# Patient Record
Sex: MALE | Race: WHITE | NOT HISPANIC OR LATINO | Employment: OTHER | URBAN - METROPOLITAN AREA
[De-identification: names, ages, dates, MRNs, and addresses within clinical notes are randomized per-mention and may not be internally consistent; named-entity substitution may affect disease eponyms.]

---

## 2017-01-05 ENCOUNTER — OFFICE VISIT - HEALTHEAST (OUTPATIENT)
Dept: FAMILY MEDICINE | Facility: CLINIC | Age: 67
End: 2017-01-05

## 2017-01-05 DIAGNOSIS — J40 BRONCHITIS: ICD-10-CM

## 2017-01-05 ASSESSMENT — MIFFLIN-ST. JEOR: SCORE: 1767.36

## 2017-01-06 ENCOUNTER — COMMUNICATION - HEALTHEAST (OUTPATIENT)
Dept: CARDIOLOGY | Facility: CLINIC | Age: 67
End: 2017-01-06

## 2017-01-06 ENCOUNTER — RECORDS - HEALTHEAST (OUTPATIENT)
Dept: ADMINISTRATIVE | Facility: OTHER | Age: 67
End: 2017-01-06

## 2017-01-06 DIAGNOSIS — E78.5 HYPERLIPEMIA: ICD-10-CM

## 2017-04-27 ENCOUNTER — COMMUNICATION - HEALTHEAST (OUTPATIENT)
Dept: CARDIOLOGY | Facility: CLINIC | Age: 67
End: 2017-04-27

## 2017-04-27 DIAGNOSIS — I25.5 ISCHEMIC CARDIOMYOPATHY: ICD-10-CM

## 2017-10-31 ENCOUNTER — RECORDS - HEALTHEAST (OUTPATIENT)
Dept: ADMINISTRATIVE | Facility: OTHER | Age: 67
End: 2017-10-31

## 2017-11-09 ENCOUNTER — OFFICE VISIT - HEALTHEAST (OUTPATIENT)
Dept: FAMILY MEDICINE | Facility: CLINIC | Age: 67
End: 2017-11-09

## 2017-11-09 DIAGNOSIS — N40.0 BPH (BENIGN PROSTATIC HYPERPLASIA): ICD-10-CM

## 2017-11-09 DIAGNOSIS — Z00.00 WELCOME TO MEDICARE PREVENTIVE VISIT: ICD-10-CM

## 2017-11-09 DIAGNOSIS — I25.5 ISCHEMIC CARDIOMYOPATHY: ICD-10-CM

## 2017-11-09 DIAGNOSIS — I25.10 CORONARY ATHEROSCLEROSIS: ICD-10-CM

## 2017-11-09 DIAGNOSIS — C44.90 MALIGNANT NEOPLASM OF SKIN: ICD-10-CM

## 2017-11-09 DIAGNOSIS — R97.20 ELEVATED PROSTATE SPECIFIC ANTIGEN (PSA): ICD-10-CM

## 2017-11-09 LAB
CHOLEST SERPL-MCNC: 96 MG/DL
FASTING STATUS PATIENT QL REPORTED: YES
HDLC SERPL-MCNC: 28 MG/DL
LDLC SERPL CALC-MCNC: 56 MG/DL
TRIGL SERPL-MCNC: 58 MG/DL

## 2017-11-09 ASSESSMENT — MIFFLIN-ST. JEOR: SCORE: 1771.9

## 2017-11-10 LAB
ATRIAL RATE - MUSE: 51 BPM
DIASTOLIC BLOOD PRESSURE - MUSE: NORMAL MMHG
INTERPRETATION ECG - MUSE: NORMAL
P AXIS - MUSE: 35 DEGREES
PR INTERVAL - MUSE: 194 MS
QRS DURATION - MUSE: 120 MS
QT - MUSE: 472 MS
QTC - MUSE: 435 MS
R AXIS - MUSE: -55 DEGREES
SYSTOLIC BLOOD PRESSURE - MUSE: NORMAL MMHG
T AXIS - MUSE: 29 DEGREES
VENTRICULAR RATE- MUSE: 51 BPM

## 2017-11-20 ENCOUNTER — OFFICE VISIT - HEALTHEAST (OUTPATIENT)
Dept: CARDIOLOGY | Facility: CLINIC | Age: 67
End: 2017-11-20

## 2017-11-20 DIAGNOSIS — I25.5 ISCHEMIC CARDIOMYOPATHY: ICD-10-CM

## 2017-11-20 ASSESSMENT — MIFFLIN-ST. JEOR: SCORE: 1805.69

## 2017-11-22 ENCOUNTER — HOSPITAL ENCOUNTER (OUTPATIENT)
Dept: CARDIOLOGY | Facility: HOSPITAL | Age: 67
Discharge: HOME | End: 2017-11-22
Attending: INTERNAL MEDICINE

## 2017-11-22 ASSESSMENT — MIFFLIN-ST. JEOR: SCORE: 1804.78

## 2017-11-24 LAB
AORTIC VALVE MEAN VELOCITY: 78.6 CM/S
ASCENDING AORTA: 4.1 CM
AV DIMENSIONLESS INDEX VTI: 0.8
AV MEAN GRADIENT: 3 MMHG
AV PEAK GRADIENT: 4.7 MMHG
AV VALVE AREA: 3.5 CM2
AV VELOCITY RATIO: 1
BSA FOR ECHO PROCEDURE: 2.24 M2
CV BLOOD PRESSURE: NORMAL MMHG
CV ECHO HEIGHT: 74.5 IN
CV ECHO WEIGHT: 211 LBS
DOP CALC AO PEAK VEL: 108 CM/S
DOP CALC AO VTI: 24.9 CM
DOP CALC LVOT AREA: 4.52 CM2
DOP CALC LVOT DIAMETER: 2.4 CM
DOP CALC LVOT PEAK VEL: 107 CM/S
DOP CALC LVOT STROKE VOLUME: 88.2 CM3
DOP CALCLVOT PEAK VEL VTI: 19.5 CM
ECHO EJECTION FRACTION ESTIMATED: 40 %
EJECTION FRACTION: 44 % (ref 55–75)
FRACTIONAL SHORTENING: 21.8 % (ref 28–44)
INTERVENTRICULAR SEPTUM IN END DIASTOLE: 0.95 CM (ref 0.6–1)
IVS/PW RATIO: 0.8
LA AREA 1: 14.8 CM2
LA AREA 2: 15.9 CM2
LEFT ATRIUM LENGTH: 4.4 CM
LEFT ATRIUM SIZE: 3.8 CM
LEFT ATRIUM VOLUME INDEX: 20.3 ML/M2
LEFT ATRIUM VOLUME: 45.5 CM3
LEFT VENTRICLE DIASTOLIC VOLUME INDEX: 49.1 CM3/M2 (ref 34–74)
LEFT VENTRICLE DIASTOLIC VOLUME: 110 CM3 (ref 62–150)
LEFT VENTRICLE MASS INDEX: 84 G/M2
LEFT VENTRICLE SYSTOLIC VOLUME INDEX: 27.3 CM3/M2 (ref 11–31)
LEFT VENTRICLE SYSTOLIC VOLUME: 61.2 CM3 (ref 21–61)
LEFT VENTRICULAR INTERNAL DIMENSION IN DIASTOLE: 4.9 CM (ref 4.2–5.8)
LEFT VENTRICULAR INTERNAL DIMENSION IN SYSTOLE: 3.83 CM (ref 2.5–4)
LEFT VENTRICULAR MASS: 188.2 G
LEFT VENTRICULAR OUTFLOW TRACT MEAN GRADIENT: 2 MMHG
LEFT VENTRICULAR OUTFLOW TRACT MEAN VELOCITY: 64.9 CM/S
LEFT VENTRICULAR OUTFLOW TRACT PEAK GRADIENT: 3 MMHG
LEFT VENTRICULAR POSTERIOR WALL IN END DIASTOLE: 1.15 CM (ref 0.6–1)
LV STROKE VOLUME INDEX: 39.4 ML/M2
MITRAL VALVE E/A RATIO: 0.7
MV AVERAGE E/E' RATIO: 10.7 CM/S
MV DECELERATION TIME: 271 MS
MV E'TISSUE VEL-LAT: 3.96 CM/S
MV E'TISSUE VEL-MED: 4.74 CM/S
MV LATERAL E/E' RATIO: 11.7
MV MEDIAL E/E' RATIO: 9.8
MV PEAK A VELOCITY: 63.3 CM/S
MV PEAK E VELOCITY: 46.4 CM/S
NUC REST DIASTOLIC VOLUME INDEX: 3376 LBS
NUC REST SYSTOLIC VOLUME INDEX: 74.5 IN
TRICUSPID VALVE ANULAR PLANE SYSTOLIC EXCURSION: 2.4 CM

## 2017-12-02 ENCOUNTER — COMMUNICATION - HEALTHEAST (OUTPATIENT)
Dept: CARDIOLOGY | Facility: CLINIC | Age: 67
End: 2017-12-02

## 2017-12-02 DIAGNOSIS — I25.10 CAD (CORONARY ARTERY DISEASE): ICD-10-CM

## 2017-12-31 ENCOUNTER — COMMUNICATION - HEALTHEAST (OUTPATIENT)
Dept: CARDIOLOGY | Facility: CLINIC | Age: 67
End: 2017-12-31

## 2017-12-31 DIAGNOSIS — E78.5 HYPERLIPEMIA: ICD-10-CM

## 2018-01-19 ENCOUNTER — COMMUNICATION - HEALTHEAST (OUTPATIENT)
Dept: CARDIOLOGY | Facility: CLINIC | Age: 68
End: 2018-01-19

## 2018-01-19 DIAGNOSIS — I25.5 ISCHEMIC CARDIOMYOPATHY: ICD-10-CM

## 2018-02-26 ENCOUNTER — RECORDS - HEALTHEAST (OUTPATIENT)
Dept: ADMINISTRATIVE | Facility: OTHER | Age: 68
End: 2018-02-26

## 2018-07-16 ENCOUNTER — COMMUNICATION - HEALTHEAST (OUTPATIENT)
Dept: CARDIOLOGY | Facility: CLINIC | Age: 68
End: 2018-07-16

## 2018-07-16 DIAGNOSIS — I25.5 ISCHEMIC CARDIOMYOPATHY: ICD-10-CM

## 2018-08-22 ENCOUNTER — COMMUNICATION - HEALTHEAST (OUTPATIENT)
Dept: CARDIOLOGY | Facility: CLINIC | Age: 68
End: 2018-08-22

## 2018-08-22 DIAGNOSIS — I25.10 CAD (CORONARY ARTERY DISEASE): ICD-10-CM

## 2018-09-27 ENCOUNTER — COMMUNICATION - HEALTHEAST (OUTPATIENT)
Dept: CARDIOLOGY | Facility: CLINIC | Age: 68
End: 2018-09-27

## 2018-09-27 DIAGNOSIS — E78.5 HYPERLIPEMIA: ICD-10-CM

## 2018-12-26 ENCOUNTER — COMMUNICATION - HEALTHEAST (OUTPATIENT)
Dept: CARDIOLOGY | Facility: CLINIC | Age: 68
End: 2018-12-26

## 2018-12-26 DIAGNOSIS — E78.5 HYPERLIPEMIA: ICD-10-CM

## 2019-01-14 ENCOUNTER — OFFICE VISIT - HEALTHEAST (OUTPATIENT)
Dept: CARDIOLOGY | Facility: CLINIC | Age: 69
End: 2019-01-14

## 2019-01-14 DIAGNOSIS — I25.10 CORONARY ARTERY DISEASE INVOLVING NATIVE CORONARY ARTERY OF NATIVE HEART WITHOUT ANGINA PECTORIS: ICD-10-CM

## 2019-01-14 ASSESSMENT — MIFFLIN-ST. JEOR: SCORE: 1814.98

## 2019-01-22 ENCOUNTER — RECORDS - HEALTHEAST (OUTPATIENT)
Dept: ADMINISTRATIVE | Facility: OTHER | Age: 69
End: 2019-01-22

## 2019-02-14 ENCOUNTER — COMMUNICATION - HEALTHEAST (OUTPATIENT)
Dept: CARDIOLOGY | Facility: CLINIC | Age: 69
End: 2019-02-14

## 2019-02-14 DIAGNOSIS — I25.5 ISCHEMIC CARDIOMYOPATHY: ICD-10-CM

## 2019-03-22 ENCOUNTER — OFFICE VISIT - HEALTHEAST (OUTPATIENT)
Dept: FAMILY MEDICINE | Facility: CLINIC | Age: 69
End: 2019-03-22

## 2019-03-22 DIAGNOSIS — Z00.00 MEDICARE ANNUAL WELLNESS VISIT, SUBSEQUENT: ICD-10-CM

## 2019-03-22 DIAGNOSIS — I25.10 ATHEROSCLEROSIS OF CORONARY ARTERY OF NATIVE HEART WITHOUT ANGINA PECTORIS, UNSPECIFIED VESSEL OR LESION TYPE: ICD-10-CM

## 2019-03-22 DIAGNOSIS — N40.0 BENIGN PROSTATIC HYPERPLASIA WITHOUT LOWER URINARY TRACT SYMPTOMS: ICD-10-CM

## 2019-03-22 DIAGNOSIS — I25.5 ISCHEMIC CARDIOMYOPATHY: ICD-10-CM

## 2019-03-22 DIAGNOSIS — L98.9 SKIN LESIONS: ICD-10-CM

## 2019-03-22 DIAGNOSIS — E78.5 HYPERLIPIDEMIA, UNSPECIFIED HYPERLIPIDEMIA TYPE: ICD-10-CM

## 2019-03-22 DIAGNOSIS — Z12.11 SCREEN FOR COLON CANCER: ICD-10-CM

## 2019-03-22 LAB
ALBUMIN SERPL-MCNC: 3.8 G/DL (ref 3.5–5)
ALP SERPL-CCNC: 56 U/L (ref 45–120)
ALT SERPL W P-5'-P-CCNC: 30 U/L (ref 0–45)
ANION GAP SERPL CALCULATED.3IONS-SCNC: 10 MMOL/L (ref 5–18)
AST SERPL W P-5'-P-CCNC: 21 U/L (ref 0–40)
BILIRUB DIRECT SERPL-MCNC: 0.4 MG/DL
BILIRUB SERPL-MCNC: 0.9 MG/DL (ref 0–1)
BUN SERPL-MCNC: 20 MG/DL (ref 8–22)
CALCIUM SERPL-MCNC: 9.4 MG/DL (ref 8.5–10.5)
CHLORIDE BLD-SCNC: 106 MMOL/L (ref 98–107)
CHOLEST SERPL-MCNC: 101 MG/DL
CO2 SERPL-SCNC: 26 MMOL/L (ref 22–31)
CREAT SERPL-MCNC: 0.94 MG/DL (ref 0.7–1.3)
ERYTHROCYTE [DISTWIDTH] IN BLOOD BY AUTOMATED COUNT: 12.2 % (ref 11–14.5)
FASTING STATUS PATIENT QL REPORTED: YES
GFR SERPL CREATININE-BSD FRML MDRD: >60 ML/MIN/1.73M2
GLUCOSE BLD-MCNC: 94 MG/DL (ref 70–125)
HCT VFR BLD AUTO: 48.3 % (ref 40–54)
HDLC SERPL-MCNC: 33 MG/DL
HGB BLD-MCNC: 16.2 G/DL (ref 14–18)
LDLC SERPL CALC-MCNC: 52 MG/DL
MCH RBC QN AUTO: 29.4 PG (ref 27–34)
MCHC RBC AUTO-ENTMCNC: 33.5 G/DL (ref 32–36)
MCV RBC AUTO: 88 FL (ref 80–100)
PLATELET # BLD AUTO: 216 THOU/UL (ref 140–440)
PMV BLD AUTO: 8.3 FL (ref 7–10)
POTASSIUM BLD-SCNC: 4.5 MMOL/L (ref 3.5–5)
PROT SERPL-MCNC: 6.7 G/DL (ref 6–8)
PSA SERPL-MCNC: 2.3 NG/ML (ref 0–4.5)
RBC # BLD AUTO: 5.51 MILL/UL (ref 4.4–6.2)
SODIUM SERPL-SCNC: 142 MMOL/L (ref 136–145)
TRIGL SERPL-MCNC: 79 MG/DL
WBC: 6.1 THOU/UL (ref 4–11)

## 2019-03-22 ASSESSMENT — MIFFLIN-ST. JEOR: SCORE: 1782.1

## 2019-04-01 ENCOUNTER — COMMUNICATION - HEALTHEAST (OUTPATIENT)
Dept: CARDIOLOGY | Facility: CLINIC | Age: 69
End: 2019-04-01

## 2019-04-01 DIAGNOSIS — E78.5 HYPERLIPEMIA: ICD-10-CM

## 2019-05-15 ENCOUNTER — COMMUNICATION - HEALTHEAST (OUTPATIENT)
Dept: CARDIOLOGY | Facility: CLINIC | Age: 69
End: 2019-05-15

## 2019-05-15 DIAGNOSIS — I25.10 CAD (CORONARY ARTERY DISEASE): ICD-10-CM

## 2019-10-25 ENCOUNTER — RECORDS - HEALTHEAST (OUTPATIENT)
Dept: ADMINISTRATIVE | Facility: OTHER | Age: 69
End: 2019-10-25

## 2019-10-31 ENCOUNTER — AMBULATORY - HEALTHEAST (OUTPATIENT)
Dept: NURSING | Facility: CLINIC | Age: 69
End: 2019-10-31

## 2019-11-14 ENCOUNTER — COMMUNICATION - HEALTHEAST (OUTPATIENT)
Dept: CARDIOLOGY | Facility: CLINIC | Age: 69
End: 2019-11-14

## 2019-11-14 DIAGNOSIS — I25.5 ISCHEMIC CARDIOMYOPATHY: ICD-10-CM

## 2019-11-14 DIAGNOSIS — I25.10 CAD (CORONARY ARTERY DISEASE): ICD-10-CM

## 2019-12-24 ENCOUNTER — COMMUNICATION - HEALTHEAST (OUTPATIENT)
Dept: CARDIOLOGY | Facility: CLINIC | Age: 69
End: 2019-12-24

## 2019-12-24 DIAGNOSIS — E78.5 HYPERLIPEMIA: ICD-10-CM

## 2020-01-02 ENCOUNTER — OFFICE VISIT - HEALTHEAST (OUTPATIENT)
Dept: FAMILY MEDICINE | Facility: CLINIC | Age: 70
End: 2020-01-02

## 2020-01-02 DIAGNOSIS — J01.40 ACUTE NON-RECURRENT PANSINUSITIS: ICD-10-CM

## 2020-01-02 DIAGNOSIS — I10 BENIGN ESSENTIAL HYPERTENSION: ICD-10-CM

## 2020-01-14 ENCOUNTER — OFFICE VISIT - HEALTHEAST (OUTPATIENT)
Dept: CARDIOLOGY | Facility: CLINIC | Age: 70
End: 2020-01-14

## 2020-01-14 DIAGNOSIS — I25.5 ISCHEMIC CARDIOMYOPATHY: ICD-10-CM

## 2020-01-14 DIAGNOSIS — I25.10 CORONARY ARTERY DISEASE INVOLVING NATIVE CORONARY ARTERY OF NATIVE HEART WITHOUT ANGINA PECTORIS: ICD-10-CM

## 2020-01-14 ASSESSMENT — MIFFLIN-ST. JEOR: SCORE: 1788.9

## 2020-01-16 ENCOUNTER — HOSPITAL ENCOUNTER (OUTPATIENT)
Dept: CARDIOLOGY | Facility: HOSPITAL | Age: 70
Discharge: HOME | End: 2020-01-16
Attending: INTERNAL MEDICINE

## 2020-01-16 ASSESSMENT — MIFFLIN-ST. JEOR: SCORE: 1788.9

## 2020-01-17 LAB
AORTIC ROOT: 4.1 CM
ASCENDING AORTA: 4 CM
BSA FOR ECHO PROCEDURE: 2.23 M2
CV BLOOD PRESSURE: ABNORMAL MMHG
CV ECHO HEIGHT: 73.5 IN
CV ECHO WEIGHT: 211 LBS
DOP CALC LVOT AREA: 3.14 CM2
DOP CALC LVOT DIAMETER: 2 CM
DOP CALC LVOT PEAK VEL: 108 CM/S
DOP CALC LVOT STROKE VOLUME: 75.7 CM3
DOP CALCLVOT PEAK VEL VTI: 24.1 CM
EJECTION FRACTION: 32 % (ref 55–75)
FRACTIONAL SHORTENING: 32.4 % (ref 28–44)
INTERVENTRICULAR SEPTUM IN END DIASTOLE: 0.99 CM (ref 0.6–1)
IVS/PW RATIO: 0.8
LA AREA 1: 19 CM2
LA AREA 2: 19 CM2
LEFT ATRIUM LENGTH: 4.7 CM
LEFT ATRIUM SIZE: 3.4 CM
LEFT ATRIUM VOLUME INDEX: 29.3 ML/M2
LEFT ATRIUM VOLUME: 65.3 ML
LEFT VENTRICLE CARDIAC INDEX: 2 L/MIN/M2
LEFT VENTRICLE CARDIAC OUTPUT: 4.4 L/MIN
LEFT VENTRICLE DIASTOLIC VOLUME INDEX: 49.8 CM3/M2 (ref 34–74)
LEFT VENTRICLE DIASTOLIC VOLUME: 111 CM3 (ref 62–150)
LEFT VENTRICLE HEART RATE: 58 BPM
LEFT VENTRICLE MASS INDEX: 87.3 G/M2
LEFT VENTRICLE SYSTOLIC VOLUME INDEX: 34.1 CM3/M2 (ref 11–31)
LEFT VENTRICLE SYSTOLIC VOLUME: 76 CM3 (ref 21–61)
LEFT VENTRICULAR INTERNAL DIMENSION IN DIASTOLE: 4.75 CM (ref 4.2–5.8)
LEFT VENTRICULAR INTERNAL DIMENSION IN SYSTOLE: 3.21 CM (ref 2.5–4)
LEFT VENTRICULAR MASS: 194.6 G
LEFT VENTRICULAR OUTFLOW TRACT MEAN GRADIENT: 2 MMHG
LEFT VENTRICULAR OUTFLOW TRACT MEAN VELOCITY: 73.7 CM/S
LEFT VENTRICULAR OUTFLOW TRACT PEAK GRADIENT: 5 MMHG
LEFT VENTRICULAR POSTERIOR WALL IN END DIASTOLE: 1.24 CM (ref 0.6–1)
LV STROKE VOLUME INDEX: 33.9 ML/M2
MITRAL VALVE DECELERATION SLOPE: 3050 MM/S2
MITRAL VALVE E/A RATIO: 0.7
MITRAL VALVE PRESSURE HALF-TIME: 59 MS
MV AVERAGE E/E' RATIO: 11.2 CM/S
MV DECELERATION TIME: 201 MS
MV E'TISSUE VEL-LAT: 5.61 CM/S
MV E'TISSUE VEL-MED: 5.32 CM/S
MV LATERAL E/E' RATIO: 10.9
MV MEDIAL E/E' RATIO: 11.5
MV PEAK A VELOCITY: 88.3 CM/S
MV PEAK E VELOCITY: 61.1 CM/S
MV VALVE AREA PRESSURE 1/2 METHOD: 3.7 CM2
NUC REST DIASTOLIC VOLUME INDEX: 3376 LBS
NUC REST SYSTOLIC VOLUME INDEX: 73.5 IN
PR MAX PG: 5 MMHG
PR PEAK VELOCITY: 116 CM/S
TRICUSPID REGURGITATION PEAK PRESSURE GRADIENT: 24.4 MMHG
TRICUSPID VALVE ANULAR PLANE SYSTOLIC EXCURSION: 2.7 CM
TRICUSPID VALVE PEAK REGURGITANT VELOCITY: 247 CM/S

## 2020-01-22 ENCOUNTER — AMBULATORY - HEALTHEAST (OUTPATIENT)
Dept: NURSING | Facility: CLINIC | Age: 70
End: 2020-01-22

## 2020-01-22 DIAGNOSIS — Z23 NEED FOR SHINGLES VACCINE: ICD-10-CM

## 2020-01-28 ENCOUNTER — AMBULATORY - HEALTHEAST (OUTPATIENT)
Dept: CARDIOLOGY | Facility: CLINIC | Age: 70
End: 2020-01-28

## 2020-01-28 DIAGNOSIS — I25.10 CORONARY ATHEROSCLEROSIS: ICD-10-CM

## 2020-01-28 DIAGNOSIS — I25.5 ISCHEMIC CARDIOMYOPATHY: ICD-10-CM

## 2020-02-05 ENCOUNTER — HOSPITAL ENCOUNTER (OUTPATIENT)
Dept: NUCLEAR MEDICINE | Facility: HOSPITAL | Age: 70
Discharge: HOME | End: 2020-02-05
Attending: INTERNAL MEDICINE

## 2020-02-05 ENCOUNTER — HOSPITAL ENCOUNTER (OUTPATIENT)
Dept: CARDIOLOGY | Facility: HOSPITAL | Age: 70
Discharge: HOME | End: 2020-02-05
Attending: INTERNAL MEDICINE

## 2020-02-05 DIAGNOSIS — I25.10 CORONARY ATHEROSCLEROSIS: ICD-10-CM

## 2020-02-05 DIAGNOSIS — I25.5 ISCHEMIC CARDIOMYOPATHY: ICD-10-CM

## 2020-02-05 LAB
CV STRESS CURRENT BP HE: NORMAL
CV STRESS CURRENT HR HE: 53
CV STRESS CURRENT HR HE: 53
CV STRESS CURRENT HR HE: 54
CV STRESS CURRENT HR HE: 59
CV STRESS CURRENT HR HE: 68
CV STRESS CURRENT HR HE: 69
CV STRESS CURRENT HR HE: 70
CV STRESS CURRENT HR HE: 72
CV STRESS CURRENT HR HE: 76
CV STRESS CURRENT HR HE: 78
CV STRESS CURRENT HR HE: 78
CV STRESS CURRENT HR HE: 84
CV STRESS DEVIATION TIME HE: NORMAL
CV STRESS ECHO PERCENT HR HE: NORMAL
CV STRESS EXERCISE STAGE HE: NORMAL
CV STRESS FINAL RESTING BP HE: NORMAL
CV STRESS FINAL RESTING HR HE: 70
CV STRESS MAX HR HE: 87
CV STRESS MAX TREADMILL GRADE HE: 0
CV STRESS MAX TREADMILL SPEED HE: 0
CV STRESS PEAK DIA BP HE: NORMAL
CV STRESS PEAK SYS BP HE: NORMAL
CV STRESS PHASE HE: NORMAL
CV STRESS PROTOCOL HE: NORMAL
CV STRESS RESTING PT POSITION HE: NORMAL
CV STRESS RESTING PT POSITION HE: NORMAL
CV STRESS ST DEVIATION AMOUNT HE: NORMAL
CV STRESS ST DEVIATION ELEVATION HE: NORMAL
CV STRESS ST EVELATION AMOUNT HE: NORMAL
CV STRESS TEST TYPE HE: NORMAL
CV STRESS TOTAL STAGE TIME MIN 1 HE: NORMAL
NUC STRESS EJECTION FRACTION: 40 %
RATE PRESSURE PRODUCT: NORMAL
STRESS ECHO BASELINE DIASTOLIC HE: 86
STRESS ECHO BASELINE HR: 54
STRESS ECHO BASELINE SYSTOLIC BP: 156
STRESS ECHO CALCULATED PERCENT HR: 57 %
STRESS ECHO LAST STRESS DIASTOLIC BP: 58
STRESS ECHO LAST STRESS HR: 78
STRESS ECHO LAST STRESS SYSTOLIC BP: 121
STRESS ECHO TARGET HR: 153

## 2020-02-10 ENCOUNTER — COMMUNICATION - HEALTHEAST (OUTPATIENT)
Dept: CARDIOLOGY | Facility: CLINIC | Age: 70
End: 2020-02-10

## 2020-02-10 DIAGNOSIS — I25.10 CAD (CORONARY ARTERY DISEASE): ICD-10-CM

## 2020-02-10 DIAGNOSIS — I25.5 ISCHEMIC CARDIOMYOPATHY: ICD-10-CM

## 2020-03-23 ENCOUNTER — COMMUNICATION - HEALTHEAST (OUTPATIENT)
Dept: CARDIOLOGY | Facility: CLINIC | Age: 70
End: 2020-03-23

## 2020-03-23 DIAGNOSIS — E78.5 HYPERLIPEMIA: ICD-10-CM

## 2020-09-09 ENCOUNTER — COMMUNICATION - HEALTHEAST (OUTPATIENT)
Dept: CARDIOLOGY | Facility: CLINIC | Age: 70
End: 2020-09-09

## 2020-09-09 DIAGNOSIS — I25.5 ISCHEMIC CARDIOMYOPATHY: ICD-10-CM

## 2020-09-16 ENCOUNTER — COMMUNICATION - HEALTHEAST (OUTPATIENT)
Dept: CARDIOLOGY | Facility: CLINIC | Age: 70
End: 2020-09-16

## 2020-09-16 DIAGNOSIS — E78.5 HYPERLIPEMIA: ICD-10-CM

## 2020-11-04 ENCOUNTER — COMMUNICATION - HEALTHEAST (OUTPATIENT)
Dept: CARDIOLOGY | Facility: CLINIC | Age: 70
End: 2020-11-04

## 2020-11-04 DIAGNOSIS — I10 HTN (HYPERTENSION): ICD-10-CM

## 2020-11-16 ENCOUNTER — COMMUNICATION - HEALTHEAST (OUTPATIENT)
Dept: PHARMACY | Facility: HOSPITAL | Age: 70
End: 2020-11-16

## 2020-11-17 ENCOUNTER — AMBULATORY - HEALTHEAST (OUTPATIENT)
Dept: CARDIOLOGY | Facility: CLINIC | Age: 70
End: 2020-11-17

## 2020-11-17 DIAGNOSIS — I10 BENIGN ESSENTIAL HYPERTENSION: ICD-10-CM

## 2020-12-14 ENCOUNTER — OFFICE VISIT - HEALTHEAST (OUTPATIENT)
Dept: FAMILY MEDICINE | Facility: CLINIC | Age: 70
End: 2020-12-14

## 2020-12-14 DIAGNOSIS — Z00.00 MEDICARE ANNUAL WELLNESS VISIT, SUBSEQUENT: ICD-10-CM

## 2020-12-14 DIAGNOSIS — R35.0 BENIGN PROSTATIC HYPERPLASIA WITH URINARY FREQUENCY: ICD-10-CM

## 2020-12-14 DIAGNOSIS — E78.5 HYPERLIPIDEMIA, UNSPECIFIED HYPERLIPIDEMIA TYPE: ICD-10-CM

## 2020-12-14 DIAGNOSIS — R31.0 GROSS HEMATURIA: ICD-10-CM

## 2020-12-14 DIAGNOSIS — I25.10 ATHEROSCLEROSIS OF NATIVE CORONARY ARTERY OF NATIVE HEART WITHOUT ANGINA PECTORIS: ICD-10-CM

## 2020-12-14 DIAGNOSIS — I10 BENIGN ESSENTIAL HYPERTENSION: ICD-10-CM

## 2020-12-14 DIAGNOSIS — N40.1 BENIGN PROSTATIC HYPERPLASIA WITH URINARY FREQUENCY: ICD-10-CM

## 2020-12-14 DIAGNOSIS — I25.5 ISCHEMIC CARDIOMYOPATHY: ICD-10-CM

## 2020-12-14 LAB
ALBUMIN SERPL-MCNC: 4 G/DL (ref 3.5–5)
ALBUMIN UR-MCNC: NEGATIVE MG/DL
ALP SERPL-CCNC: 60 U/L (ref 45–120)
ALT SERPL W P-5'-P-CCNC: 25 U/L (ref 0–45)
ANION GAP SERPL CALCULATED.3IONS-SCNC: 11 MMOL/L (ref 5–18)
APPEARANCE UR: CLEAR
AST SERPL W P-5'-P-CCNC: 18 U/L (ref 0–40)
BILIRUB DIRECT SERPL-MCNC: 0.4 MG/DL
BILIRUB SERPL-MCNC: 0.9 MG/DL (ref 0–1)
BILIRUB UR QL STRIP: NEGATIVE
BUN SERPL-MCNC: 17 MG/DL (ref 8–22)
CALCIUM SERPL-MCNC: 9 MG/DL (ref 8.5–10.5)
CHLORIDE BLD-SCNC: 104 MMOL/L (ref 98–107)
CHOLEST SERPL-MCNC: 96 MG/DL
CO2 SERPL-SCNC: 26 MMOL/L (ref 22–31)
COLOR UR AUTO: YELLOW
CREAT SERPL-MCNC: 0.87 MG/DL (ref 0.7–1.3)
ERYTHROCYTE [DISTWIDTH] IN BLOOD BY AUTOMATED COUNT: 12 % (ref 11–14.5)
FASTING STATUS PATIENT QL REPORTED: YES
GFR SERPL CREATININE-BSD FRML MDRD: >60 ML/MIN/1.73M2
GLUCOSE BLD-MCNC: 95 MG/DL (ref 70–125)
GLUCOSE UR STRIP-MCNC: NEGATIVE MG/DL
HCT VFR BLD AUTO: 49.1 % (ref 40–54)
HDLC SERPL-MCNC: 33 MG/DL
HGB BLD-MCNC: 16.5 G/DL (ref 14–18)
HGB UR QL STRIP: NEGATIVE
KETONES UR STRIP-MCNC: NEGATIVE MG/DL
LDLC SERPL CALC-MCNC: 49 MG/DL
LEUKOCYTE ESTERASE UR QL STRIP: NEGATIVE
MCH RBC QN AUTO: 29.5 PG (ref 27–34)
MCHC RBC AUTO-ENTMCNC: 33.6 G/DL (ref 32–36)
MCV RBC AUTO: 88 FL (ref 80–100)
NITRATE UR QL: NEGATIVE
PH UR STRIP: 5.5 [PH] (ref 5–8)
PLATELET # BLD AUTO: 191 THOU/UL (ref 140–440)
PMV BLD AUTO: 8.5 FL (ref 7–10)
POTASSIUM BLD-SCNC: 4.6 MMOL/L (ref 3.5–5)
PROT SERPL-MCNC: 6.7 G/DL (ref 6–8)
PSA SERPL-MCNC: 3 NG/ML (ref 0–4.5)
RBC # BLD AUTO: 5.6 MILL/UL (ref 4.4–6.2)
SODIUM SERPL-SCNC: 141 MMOL/L (ref 136–145)
SP GR UR STRIP: 1.02 (ref 1–1.03)
TRIGL SERPL-MCNC: 70 MG/DL
UROBILINOGEN UR STRIP-ACNC: NORMAL
WBC: 5.7 THOU/UL (ref 4–11)

## 2020-12-14 RX ORDER — LISINOPRIL 10 MG/1
10 TABLET ORAL DAILY
Qty: 90 TABLET | Refills: 3 | Status: SHIPPED | OUTPATIENT
Start: 2020-12-14 | End: 2021-12-02

## 2020-12-14 ASSESSMENT — MIFFLIN-ST. JEOR: SCORE: 1789.13

## 2020-12-15 LAB — HCV AB SERPL QL IA: NEGATIVE

## 2021-01-15 ENCOUNTER — RECORDS - HEALTHEAST (OUTPATIENT)
Dept: ADMINISTRATIVE | Facility: OTHER | Age: 71
End: 2021-01-15

## 2021-02-26 ENCOUNTER — COMMUNICATION - HEALTHEAST (OUTPATIENT)
Dept: CARDIOLOGY | Facility: CLINIC | Age: 71
End: 2021-02-26

## 2021-02-26 DIAGNOSIS — E78.5 HYPERLIPEMIA: ICD-10-CM

## 2021-03-04 ENCOUNTER — COMMUNICATION - HEALTHEAST (OUTPATIENT)
Dept: CARDIOLOGY | Facility: CLINIC | Age: 71
End: 2021-03-04

## 2021-03-05 ENCOUNTER — OFFICE VISIT - HEALTHEAST (OUTPATIENT)
Dept: CARDIOLOGY | Facility: CLINIC | Age: 71
End: 2021-03-05

## 2021-03-05 DIAGNOSIS — I25.5 ISCHEMIC CARDIOMYOPATHY: ICD-10-CM

## 2021-03-05 ASSESSMENT — MIFFLIN-ST. JEOR: SCORE: 1814.98

## 2021-03-11 ENCOUNTER — AMBULATORY - HEALTHEAST (OUTPATIENT)
Dept: NURSING | Facility: CLINIC | Age: 71
End: 2021-03-11

## 2021-04-01 ENCOUNTER — AMBULATORY - HEALTHEAST (OUTPATIENT)
Dept: NURSING | Facility: CLINIC | Age: 71
End: 2021-04-01

## 2021-05-09 ENCOUNTER — COMMUNICATION - HEALTHEAST (OUTPATIENT)
Dept: CARDIOLOGY | Facility: CLINIC | Age: 71
End: 2021-05-09

## 2021-05-09 DIAGNOSIS — I25.5 ISCHEMIC CARDIOMYOPATHY: ICD-10-CM

## 2021-05-10 RX ORDER — CARVEDILOL 12.5 MG/1
TABLET ORAL
Qty: 180 TABLET | Refills: 0 | Status: SHIPPED | OUTPATIENT
Start: 2021-05-10 | End: 2021-08-10

## 2021-05-18 ENCOUNTER — OFFICE VISIT - HEALTHEAST (OUTPATIENT)
Dept: FAMILY MEDICINE | Facility: CLINIC | Age: 71
End: 2021-05-18

## 2021-05-18 DIAGNOSIS — M25.561 CHRONIC PAIN OF RIGHT KNEE: ICD-10-CM

## 2021-05-18 DIAGNOSIS — G89.29 CHRONIC PAIN OF RIGHT KNEE: ICD-10-CM

## 2021-05-24 ENCOUNTER — RECORDS - HEALTHEAST (OUTPATIENT)
Dept: ADMINISTRATIVE | Facility: OTHER | Age: 71
End: 2021-05-24

## 2021-05-24 DIAGNOSIS — E78.5 HYPERLIPEMIA: ICD-10-CM

## 2021-05-24 RX ORDER — ATORVASTATIN CALCIUM 40 MG/1
TABLET, FILM COATED ORAL
Qty: 90 TABLET | Refills: 1 | Status: SHIPPED | OUTPATIENT
Start: 2021-05-24 | End: 2021-11-18

## 2021-05-26 NOTE — PROGRESS NOTES
Assessment and Plan:       1. Medicare annual wellness visit, subsequent    Reviewed the annual wellness visit health risk assessment form  Mini cog score is 5/5  PHQ-2 score is 0  Reviewed falls risk    Consider the Shingrix/shingles vaccine      2. Screen for colon cancer    His colonoscopy is due in September 2019    3. Hyperlipidemia, unspecified hyperlipidemia type    Continue atorvastatin  - Hepatic Profile  - Lipid Cascade    4. Atherosclerosis of coronary artery of native heart without angina pectoris, unspecified vessel or lesion type  5. Ischemic cardiomyopathy    Continue to follow-up with cardiology, Dr. Ohara  Check laboratory testing as noted  Continue aspirin, atorvastatin, carvedilol, and lisinopril  Recommend increasing physical activity as tolerated  - Basic Metabolic Panel  - HM2(CBC w/o Differential)    6. Benign prostatic hyperplasia without lower urinary tract symptoms  Status post laser TURP with urology    Check a PSA  Follow-up with urology  - PSA (Prostatic-Specific Antigen), Diagnostic    7. Skin lesions  Actinic keratoses given history    5 skin lesions were treated with liquid nitrogen using the freeze-thaw technique without complication  Recommend follow-up with dermatology given potential progression to skin cancer          The patient's current medical problems were reviewed.    I have had an Advance Directives discussion with the patient.  The following health maintenance schedule was reviewed with the patient and provided in printed form in the after visit summary:   Health Maintenance   Topic Date Due     ZOSTER VACCINES (2 of 3) 09/07/2012     FALL RISK ASSESSMENT  07/29/2017     COLONOSCOPY  09/21/2019     TD 18+ HE  07/14/2021     ADVANCE DIRECTIVES DISCUSSED WITH PATIENT  11/09/2022     PNEUMOCOCCAL POLYSACCHARIDE VACCINE AGE 65 AND OVER  Completed     INFLUENZA VACCINE RULE BASED  Completed     PNEUMOCOCCAL CONJUGATE VACCINE FOR ADULTS (PCV13 OR PREVNAR)  Completed         Subjective:   Chief Complaint: Harrison Barrientos is an 68 y.o. male here for an Annual Wellness visit.   HPI: This is a pleasant 68-year-old male who presents to the clinic for an annual wellness visit.    His medical history is notable for coronary artery disease with nonischemic cardiomyopathy, benign prostatic hypertrophy with obstructive symptoms, and elevated PSA, and hyperlipidemia.  Regarding his cardiovascular history he had a previous myocardial infarction in 2004.  He had single-vessel coronary artery disease and a coronary stent was placed in the proximal LAD.  He did have a follow-up echocardiogram with an ejection fraction of 38%.  He did have a cardiac MRI subsequently which revealed an ejection fraction of 41% to 2015.  He continues to follow-up with cardiology and has been compliant with his medications including carvedilol, atorvastatin, and lisinopril.    Additionally, he has followed up with urology for benign prostatic hypertrophy with obstructive symptoms.  He did have a laser TURP and his symptoms improved considerably.  At one point he did require treatment with Rapaflo but that has not been required recently.    He also has a history of skin cancer and has had a previous squamous cell carcinoma.  He has required treatment with liquid nitrogen on multiple occasions and has had Mohs surgery.  He has multiple skin lesions of concern today.    He rates his health as good.  He exercises 3-5 times per week.  He is not requiring assistance with activities of daily living.  His memory has generally been good.    He would like to get a PSA checked today.  Review of systems otherwise negative for headache, visual changes, chest pain, shortness of breath, palpitations, or other concerns.          Review of Systems:    Please see above.  The rest of the review of systems are negative for all systems.    Patient Care Team:  Brad Roldan MD as PCP - General     Patient Active Problem List    Diagnosis     Squamous Cell Carcinoma Of The Skin     Diverticulosis     Joint Pain, Localized In The Shoulder     Fatigue     Hyperlipidemia     Coronary Artery Disease     Ischemic cardiomyopathy     Neck Pain     BPH (benign prostatic hyperplasia)     Benign Prostatic Hypertrophy With Urinary Obstruction     Serology Prostate-specific Antigen (PSA) Elevated     Past Medical History:   Diagnosis Date     BPH (benign prostatic hyperplasia)      Coronary artery disease      Diverticulosis      Hyperlipidemia      Ischemic cardiomyopathy      Myocardial infarction (H) 2004     Squamous cell carcinoma of skin       Past Surgical History:   Procedure Laterality Date     CORONARY STENT PLACEMENT       CYSTOSCOPY PROSTATE W/ LASER N/A 11/29/2016    Procedure: CYSTOSCOPY WITH TRANSURETHRAL RESECTION OF THE PROSTATE WITH QUANTA LASER;  Surgeon: Tre Cuadra MD;  Location: [facility];  Service:       Family History   Problem Relation Age of Onset     Heart disease Mother         coronary stents     Benign prostatic hyperplasia Father      Heart disease Father      Kidney cancer Father      Benign prostatic hyperplasia Paternal Uncle      Benign prostatic hyperplasia Paternal Grandfather       Social History     Socioeconomic History     Marital status:      Spouse name: Not on file     Number of children: Not on file     Years of education: Not on file     Highest education level: Not on file   Occupational History     Not on file   Social Needs     Financial resource strain: Not on file     Food insecurity:     Worry: Not on file     Inability: Not on file     Transportation needs:     Medical: Not on file     Non-medical: Not on file   Tobacco Use     Smoking status: Never Smoker     Smokeless tobacco: Never Used   Substance and Sexual Activity     Alcohol use: Yes     Comment: occasionally     Drug use: No     Sexual activity: Not on file   Lifestyle     Physical activity:     Days per week:  "Not on file     Minutes per session: Not on file     Stress: Not on file   Relationships     Social connections:     Talks on phone: Not on file     Gets together: Not on file     Attends Alevism service: Not on file     Active member of club or organization: Not on file     Attends meetings of clubs or organizations: Not on file     Relationship status: Not on file     Intimate partner violence:     Fear of current or ex partner: Not on file     Emotionally abused: Not on file     Physically abused: Not on file     Forced sexual activity: Not on file   Other Topics Concern     Not on file   Social History Narrative     Not on file      Current Outpatient Medications   Medication Sig Dispense Refill     aspirin 81 MG EC tablet Take 1 tablet (81 mg total) by mouth daily.  0     atorvastatin (LIPITOR) 40 MG tablet Take 1 tablet (40 mg total) by mouth daily. 90 tablet 0     carvedilol (COREG) 12.5 MG tablet TAKE 1 TABLET BY MOUTH TWICE DAILY WITH MEALS 180 tablet 2     lisinopril (PRINIVIL,ZESTRIL) 5 MG tablet TAKE 1 TABLET BY MOUTH DAILY 90 tablet 2     loratadine (CLARITIN) 10 mg tablet Take 10 mg by mouth daily as needed.        No current facility-administered medications for this visit.       Objective:   Vital Signs:   Visit Vitals  /76   Pulse (!) 56   Ht 6' 1.5\" (1.867 m)   Wt 209 lb 8 oz (95 kg)   BMI 27.27 kg/m         VisionScreening:  No exam data present     PHYSICAL EXAM  General appearance: alert, appears stated age and cooperative  Head: Normocephalic, without obvious abnormality, atraumatic  Eyes: conjunctivae/corneas clear. PERRL, EOM's intact.   Ears: normal TM's and external ear canals both ears  Nose: Nares normal. Septum midline. Mucosa normal. No drainage or sinus tenderness.  Throat: lips, mucosa, and tongue normal; teeth and gums normal  Neck: no adenopathy, supple, symmetrical, trachea midline and thyroid not enlarged, symmetric, no tenderness/mass/nodules  No carotid bruits are " heard  Back: symmetric, no curvature. ROM normal. No CVA tenderness.  Lungs: clear to auscultation bilaterally  Heart: regular rate and rhythm, S1, S2 normal, no murmur, click, rub or gallop  Abdomen: soft, non-tender; bowel sounds normal; no masses,  no organomegaly  Extremities: extremities normal, atraumatic, no cyanosis or edema  Skin: There are multiple whitish scaly lesions involving the forehead and cheeks  Lymph nodes: Cervical nodes normal.  Neurologic: Alert and oriented X 3   Cranial nerves II-XII are intact        Assessment Results 3/22/2019   Activities of Daily Living No help needed   Instrumental Activities of Daily Living No help needed   Mini Cog Total Score 5   Some recent data might be hidden     A Mini-Cog score of 0-2 suggests the possibility of dementia, score of 3-5 suggests no dementia    Identified Health Risks:     The patient was counseled and encouraged to consider modifying their diet and eating habits. He was provided with information on recommended healthy diet options.  Information regarding advance directives (living velázquez), including where he can download the appropriate form, was provided to the patient via the AVS.

## 2021-05-27 VITALS
WEIGHT: 215 LBS | DIASTOLIC BLOOD PRESSURE: 81 MMHG | SYSTOLIC BLOOD PRESSURE: 153 MMHG | TEMPERATURE: 97.1 F | HEART RATE: 57 BPM | RESPIRATION RATE: 16 BRPM

## 2021-05-29 ENCOUNTER — HEALTH MAINTENANCE LETTER (OUTPATIENT)
Age: 71
End: 2021-05-29

## 2021-05-30 VITALS — BODY MASS INDEX: 27.57 KG/M2 | HEIGHT: 73 IN | WEIGHT: 208 LBS

## 2021-05-31 VITALS — WEIGHT: 211.2 LBS | HEIGHT: 75 IN | BODY MASS INDEX: 26.26 KG/M2

## 2021-05-31 VITALS — WEIGHT: 209 LBS | BODY MASS INDEX: 27.7 KG/M2 | HEIGHT: 73 IN

## 2021-05-31 VITALS — HEIGHT: 75 IN | WEIGHT: 211 LBS | BODY MASS INDEX: 26.24 KG/M2

## 2021-06-02 VITALS — WEIGHT: 209.5 LBS | BODY MASS INDEX: 26.89 KG/M2 | HEIGHT: 74 IN

## 2021-06-02 VITALS — BODY MASS INDEX: 27.59 KG/M2 | HEIGHT: 74 IN | WEIGHT: 215 LBS

## 2021-06-03 ENCOUNTER — TRANSFERRED RECORDS (OUTPATIENT)
Dept: HEALTH INFORMATION MANAGEMENT | Facility: CLINIC | Age: 71
End: 2021-06-03
Payer: COMMERCIAL

## 2021-06-03 ENCOUNTER — RECORDS - HEALTHEAST (OUTPATIENT)
Dept: ADMINISTRATIVE | Facility: OTHER | Age: 71
End: 2021-06-03

## 2021-06-04 ENCOUNTER — RECORDS - HEALTHEAST (OUTPATIENT)
Dept: ADMINISTRATIVE | Facility: OTHER | Age: 71
End: 2021-06-04

## 2021-06-04 ENCOUNTER — TRANSFERRED RECORDS (OUTPATIENT)
Dept: HEALTH INFORMATION MANAGEMENT | Facility: CLINIC | Age: 71
End: 2021-06-04
Payer: COMMERCIAL

## 2021-06-04 VITALS
RESPIRATION RATE: 16 BRPM | SYSTOLIC BLOOD PRESSURE: 118 MMHG | WEIGHT: 211 LBS | DIASTOLIC BLOOD PRESSURE: 66 MMHG | BODY MASS INDEX: 27.08 KG/M2 | HEIGHT: 74 IN | HEART RATE: 69 BPM

## 2021-06-04 VITALS
HEART RATE: 60 BPM | DIASTOLIC BLOOD PRESSURE: 75 MMHG | BODY MASS INDEX: 27.58 KG/M2 | OXYGEN SATURATION: 96 % | SYSTOLIC BLOOD PRESSURE: 157 MMHG | TEMPERATURE: 98.2 F | WEIGHT: 211.9 LBS | RESPIRATION RATE: 12 BRPM

## 2021-06-04 VITALS — HEIGHT: 74 IN | BODY MASS INDEX: 27.08 KG/M2 | WEIGHT: 211 LBS

## 2021-06-04 NOTE — PROGRESS NOTES
Walk In Care Note                                                        Date of Visit: 1/2/2020     Chief Complaint   Harrison Barrientos is a(n) 69 y.o. White or  male who presents to [facility] with the following complaint(s):  Sinus Problem (started on henrique, teeth pain for the last few days, coughing at night, no fever (felt warm), runny nose)       Assessment and Plan   1. Acute non-recurrent pansinusitis  - amoxicillin-clavulanate (AUGMENTIN) 875-125 mg per tablet; Take 1 tablet by mouth 2 (two) times a day for 10 days.  Dispense: 20 tablet; Refill: 0    2. Benign essential hypertension      Treating sinusitis with Augmentin as listed above. Recommended use of a probiotic while taking this antibiotic. Patient to follow up with Primary Care provider regarding elevated blood pressure.     Counseled patient regarding assessment and plan for evaluation and treatment. Questions were answered. See AVS for the specific written instructions and educational handout(s) regarding sinusitis that were provided at the conclusion of the visit.     Discussed signs / symptoms that warrant urgent / emergent medical attention.     Follow up with Primary Care in 2 weeks. Return as needed in the interim.      History of Present Illness   Primary symptom: Sinus problem  Onset: 8 day(s) ago  Progression: Worsening  Congestion: Yes  Nasal discharge: Green  Dental pain: Since yesterday  Maxillary sinus pressure: Yes  Frontal sinus pressure: Yes  Headache: No  Fevers: Initially  Chills: Initially  Additional symptoms: Coughing spells  Home therapies utilized: Robitussin  History of sinusitis: Remotely  History of sinus surgery: No     Review of Systems   Review of Systems   All other systems reviewed and are negative.       Physical Exam   Vitals:    01/02/20 0953 01/02/20 0955   BP: 168/81 157/75   Pulse: 60    Resp: 12    Temp: 98.2  F (36.8  C)    TempSrc: Oral    SpO2: 96%    Weight: 211 lb 14.4 oz (96.1 kg)       Physical Exam  Vitals signs and nursing note reviewed.   Constitutional:       General: He is not in acute distress.     Appearance: He is well-developed and normal weight. He is not ill-appearing or toxic-appearing.   HENT:      Head: Normocephalic and atraumatic.      Right Ear: Tympanic membrane, ear canal and external ear normal.      Left Ear: Tympanic membrane, ear canal and external ear normal.      Nose: Mucosal edema present. No rhinorrhea.      Right Sinus: Maxillary sinus tenderness and frontal sinus tenderness present.      Left Sinus: Maxillary sinus tenderness and frontal sinus tenderness present.      Mouth/Throat:      Mouth: Mucous membranes are moist. No oral lesions.      Pharynx: Uvula midline. No oropharyngeal exudate or posterior oropharyngeal erythema.   Eyes:      General: Lids are normal.      Conjunctiva/sclera: Conjunctivae normal.   Neck:      Musculoskeletal: Neck supple. No edema or erythema.   Cardiovascular:      Rate and Rhythm: Normal rate and regular rhythm.      Heart sounds: S1 normal and S2 normal. No murmur. No friction rub. No gallop.    Pulmonary:      Effort: Pulmonary effort is normal.      Breath sounds: Normal breath sounds. No stridor. No wheezing, rhonchi or rales.   Lymphadenopathy:      Cervical: No cervical adenopathy.   Skin:     General: Skin is warm and dry.      Coloration: Skin is not pale.      Findings: No rash.   Neurological:      General: No focal deficit present.      Mental Status: He is alert and oriented to person, place, and time.          Diagnostic Studies   Laboratory:  N/A  Radiology:  N/A  Electrocardiogram:  N/A     Procedure Note   N/A     Pertinent History   The following portions of the patient's history were reviewed and updated as appropriate: allergies, current medications, past family history, past medical history, past social history, past surgical history and problem list.    Patient has Squamous Cell Carcinoma Of The Skin;  Diverticulosis; Joint Pain, Localized In The Shoulder; Fatigue; Hyperlipidemia; Coronary Artery Disease; Ischemic cardiomyopathy; Neck Pain; BPH (benign prostatic hyperplasia); Benign Prostatic Hypertrophy With Urinary Obstruction; Serology Prostate-specific Antigen (PSA) Elevated; and Adenomatous polyp of sigmoid colon on their problem list.    Patient has a past medical history of BPH (benign prostatic hyperplasia), Coronary artery disease, Diverticulosis, Hyperlipidemia, Ischemic cardiomyopathy, Myocardial infarction (H) (2004), and Squamous cell carcinoma of skin.    Patient has a past surgical history that includes Coronary stent placement and Cystoscopy prostate w/ laser (N/A, 11/29/2016).    Patient's family history includes Benign prostatic hyperplasia in his father, paternal grandfather, and paternal uncle; Heart disease in his father and mother; Kidney cancer in his father.    Patient reports that he has never smoked. He has never used smokeless tobacco. He reports current alcohol use. He reports that he does not use drugs.     Portions of this note have been dictated using voice recognition software. Any grammatical or contextual distortions are unintentional and inherent to the software.    Teofilo Emmanuel MD  [facility]

## 2021-06-05 VITALS
BODY MASS INDEX: 28.2 KG/M2 | HEART RATE: 57 BPM | WEIGHT: 212.8 LBS | RESPIRATION RATE: 16 BRPM | TEMPERATURE: 98.8 F | HEIGHT: 73 IN | DIASTOLIC BLOOD PRESSURE: 80 MMHG | SYSTOLIC BLOOD PRESSURE: 150 MMHG

## 2021-06-05 VITALS
BODY MASS INDEX: 27.59 KG/M2 | RESPIRATION RATE: 16 BRPM | WEIGHT: 215 LBS | HEIGHT: 74 IN | SYSTOLIC BLOOD PRESSURE: 122 MMHG | HEART RATE: 72 BPM | DIASTOLIC BLOOD PRESSURE: 66 MMHG

## 2021-06-07 ENCOUNTER — TRANSFERRED RECORDS (OUTPATIENT)
Dept: HEALTH INFORMATION MANAGEMENT | Facility: CLINIC | Age: 71
End: 2021-06-07
Payer: COMMERCIAL

## 2021-06-08 NOTE — PROGRESS NOTES
"Assessment/ Plan     1. Bronchitis  Patient symptoms and exam are consistent with a bacterial bronchitis.  He has been coughing for about 3 weeks, was starting to feel better, and then felt worse again.  At this point, will cover him with oral antibiotics.  Recommend that he follow-up in 2 weeks if no improvement in symptoms, sooner if fever or worsening symptoms.  - azithromycin (ZITHROMAX) 250 MG tablet; Take two tablets po on day one then one tablet po q day on days 2-5.  Dispense: 6 tablet; Refill: 0      Subjective:       Harrison Barrientos is a 66 y.o. male who presents for 3 week history of cough.  This started with a typical URI type symptoms.  He actually was starting to feel better for several days, and then felt worse again.  He has felt more fatigued over the last several days.  The cough now feels \"heavy on his chest\".  It has been somewhat productive.  He denies any fevers.  He did get his flu shot and pneumonia shots.  He denies any lung disorder such as asthma or wheezing.  He did start with this cough several days after having outpatient surgery on his prostate.    The following portions of the patient's history were reviewed and updated as appropriate: allergies, current medications, past family history, past medical history, past social history, past surgical history and problem list.    Review of Systems   A 12 point comprehensive review of systems was negative except as noted.      Current Outpatient Prescriptions   Medication Sig Dispense Refill     atorvastatin (LIPITOR) 40 MG tablet Take 40 mg by mouth bedtime.       azithromycin (ZITHROMAX) 250 MG tablet Take two tablets po on day one then one tablet po q day on days 2-5. 6 tablet 0     carvedilol (COREG) 12.5 MG tablet Take 12.5 mg by mouth 2 (two) times a day with meals.       lisinopril (PRINIVIL,ZESTRIL) 5 MG tablet Take 5 mg by mouth bedtime.       lisinopril (PRINIVIL,ZESTRIL) 5 MG tablet TAKE 1 TABLET BY MOUTH DAILY 90 tablet 2     " "loratadine (CLARITIN) 10 mg tablet Take 10 mg by mouth daily as needed.        tadalafil (CIALIS) 10 MG tablet Take 10 mg by mouth daily as needed for erectile dysfunction.       No current facility-administered medications for this visit.        Objective:        Visit Vitals     /70     Pulse 64     Temp 98.2  F (36.8  C) (Oral)     Resp 12     Ht 6' 1\" (1.854 m)     Wt 208 lb (94.3 kg)     SpO2 96%     BMI 27.44 kg/m2         General appearance: alert, appears stated age and cooperative  Head: Normocephalic, without obvious abnormality, atraumatic  Eyes: conjunctivae/corneas clear.   Ears: normal TM's and external ear canals both ears  Nose: Nares normal. Septum midline. Mucosa normal. No drainage or sinus tenderness.  Throat: lips, mucosa, and tongue normal; teeth and gums normal  Neck: no adenopathy  Lungs: clear to auscultation bilaterally  Heart: regular rate and rhythm, S1, S2 normal, no murmur, click, rub or gallop      No results found for this or any previous visit (from the past 168 hour(s)).       This note has been dictated using voice recognition software. Any grammatical or context distortions are unintentional and inherent to the software  "

## 2021-06-13 NOTE — PROGRESS NOTES
Assessment and Plan:       Patient has been advised of split billing requirements and indicates understanding: Yes, information provided  1. Medicare annual wellness visit, subsequent    Reviewed the annual wellness visit health risk assessment form  Mini cog score is 5/5  PHQ-2 score is 0  Reviewed falls risk    Check hepatitis C testing  - Hepatitis C Antibody (Anti-HCV)    2. Benign essential hypertension  Inadequate control    Reviewed the importance of improved blood pressure control  Continue carvedilol and lisinopril  His recent home blood pressure readings have been well controlled  Follow-up to recheck blood pressure and recommend increasing lisinopril if blood pressure remains elevated  Follow-up with cardiology as planned  - Basic Metabolic Panel  - HM2(CBC w/o Differential)  - lisinopriL (PRINIVIL,ZESTRIL) 10 MG tablet; Take 1 tablet (10 mg total) by mouth daily.  Dispense: 90 tablet; Refill: 3    3. Hyperlipidemia, unspecified hyperlipidemia type    Continue atorvastatin  Check a lipid cascade and hepatic profile  - Hepatic Profile    4. Atherosclerosis of native coronary artery of native heart without angina pectoris  Ischemic cardiomyopathy    Reviewed the echocardiogram from 2019 showing ejection fraction of 32%  Nuclear cardiac stress test was negative for ischemia but revealed evidence of a previous transmural myocardial infarction in the mid to apical segments and anteroseptal segment.    There was akinesis in the mid to apical anterior wall and distal anteroseptal segment.        Continue current medications  He has followed up with cardiology on a consistent basis and has been taking aspirin, atorvastatin, carvedilol, and lisinopril.      - Lipid [place] FASTING    5. Benign prostatic hyperplasia with urinary frequency    Check a PSA  Follow-up with urology  - PSA, Annual Screen (Prostatic-Specific Antigen)    6. Gross hematuria    Check a urinalysis  Refer to urology given that he has a  history of BPH previously treated with laser TURP  Follow-up with urology.  He may need cystoscopy depending on results  - Ambulatory referral to Urology  - Urinalysis-UC if Indicated        The patient's current medical problems were reviewed.    I have had an Advance Directives discussion with the patient.  The following health maintenance schedule was reviewed with the patient and provided in printed form in the after visit summary:   Health Maintenance   Topic Date Due     HEPATITIS C SCREENING  1950     TD 18+ HE  07/14/2021     MEDICARE ANNUAL WELLNESS VISIT  12/14/2021     FALL RISK ASSESSMENT  12/14/2021     LIPID  03/22/2024     ADVANCE CARE PLANNING  03/22/2024     COLORECTAL CANCER SCREENING  10/25/2024     Pneumococcal Vaccine: 65+ Years  Completed     INFLUENZA VACCINE RULE BASED  Completed     ZOSTER VACCINES  Completed     Pneumococcal Vaccine: Pediatrics (0 to 5 Years) and At-Risk Patients (6 to 64 Years)  Aged Out     HEPATITIS B VACCINES  Aged Out        Subjective:   Chief Complaint: Harrison Barrientos is an 70 y.o. male here for an Annual Wellness visit.   HPI: This is a pleasant 70-year-old male who presents to the clinic for an annual wellness visit.    Medical history is notable for coronary artery disease, ischemic cardiomyopathy, benign prostatic hypertrophy with obstructive symptoms, and an elevated PSA as well as hyperlipidemia.  Regarding his cardiovascular history he had a previous myocardial infarction in 2004.  He had a single-vessel coronary artery disease with coronary stent placement in the proximal LAD at the time.  He has followed up with cardiology on a consistent basis for an ischemic cardiomyopathy.  His most recent echocardiogram revealed an ejection fraction of 32% with mild mitral regurgitation and mild tricuspid regurgitation.  His most recent nuclear cardiac stress test was negative for inducible myocardial ischemia in 2019.  There was evidence of a previous transmural  myocardial infarction in the mid to apical segments and anteroseptal segment.  There was akinesis in the mid to apical anterior wall and distal anteroseptal segment.  He has followed up with cardiology on a consistent basis and has been taking aspirin, atorvastatin, carvedilol, and lisinopril.    His blood pressure is elevated today though he states his home blood pressure readings have been in the high 120s over diastolic readings in the 70s.  His previous LDL cholesterol is 52.    He also has a history of skin cancer and has had a previous squamous cell carcinoma.  He has required treatment with liquid nitrogen on multiple occasions and has had Mohs surgery.      Review of systems is notable for recent blood in the urine.  In the past he has followed up with urology and has a known history of BPH treatment and elevated PSA.  He did have a previous laser surgery for BPH.    He has history of colon polyps as well with the most recent colonoscopy in October of 2019.  This revealed a colon polyp in the sigmoid colon which was an adenomatous polyp.    He rates his health as good.  He exercises 3-5 times per week present requiring assistance with activities of daily living.    For allergies he has been treated with loratadine his symptoms have generally been well controlled.    Review of Systems:    Please see above.  The rest of the review of systems are negative for all systems.    Patient Care Team:  Brad Roldan MD as PCP - General  Brad Roldan MD as Assigned PCP  Marline Ohara MD as Assigned Heart and Vascular Provider     Patient Active Problem List   Diagnosis     Squamous Cell Carcinoma Of The Skin     Diverticulosis     Joint Pain, Localized In The Shoulder     Fatigue     Hyperlipidemia     Coronary Artery Disease     Ischemic cardiomyopathy     Neck Pain     BPH (benign prostatic hyperplasia)     Benign Prostatic Hypertrophy With Urinary Obstruction     Serology  Prostate-specific Antigen (PSA) Elevated     Adenomatous polyp of sigmoid colon     Past Medical History:   Diagnosis Date     BPH (benign prostatic hyperplasia)      Coronary artery disease      Diverticulosis      Hyperlipidemia      Ischemic cardiomyopathy      Myocardial infarction (H) 2004     Squamous cell carcinoma of skin       Past Surgical History:   Procedure Laterality Date     CORONARY STENT PLACEMENT       CYSTOSCOPY PROSTATE W/ LASER N/A 11/29/2016    Procedure: CYSTOSCOPY WITH TRANSURETHRAL RESECTION OF THE PROSTATE WITH QUANTA LASER;  Surgeon: Tre Cuadra MD;  Location: [facility];  Service:       Family History   Problem Relation Age of Onset     Heart disease Mother         coronary stents     Benign prostatic hyperplasia Father      Heart disease Father      Kidney cancer Father      Benign prostatic hyperplasia Paternal Uncle      Benign prostatic hyperplasia Paternal Grandfather       Social History     Socioeconomic History     Marital status:      Spouse name: Not on file     Number of children: Not on file     Years of education: Not on file     Highest education level: Not on file   Occupational History     Not on file   Social Needs     Financial resource strain: Not on file     Food insecurity     Worry: Not on file     Inability: Not on file     Transportation needs     Medical: Not on file     Non-medical: Not on file   Tobacco Use     Smoking status: Never Smoker     Smokeless tobacco: Never Used   Substance and Sexual Activity     Alcohol use: Yes     Comment: occasionally     Drug use: No     Sexual activity: Not on file   Lifestyle     Physical activity     Days per week: Not on file     Minutes per session: Not on file     Stress: Not on file   Relationships     Social connections     Talks on phone: Not on file     Gets together: Not on file     Attends [employer] witness service: Not on file     Active member of club or organization: Not on file     Attends  "meetings of clubs or organizations: Not on file     Relationship status: Not on file     Intimate partner violence     Fear of current or ex partner: Not on file     Emotionally abused: Not on file     Physically abused: Not on file     Forced sexual activity: Not on file   Other Topics Concern     Not on file   Social History Narrative     Not on file      Current Outpatient Medications   Medication Sig Dispense Refill     aspirin 81 MG EC tablet Take 1 tablet (81 mg total) by mouth daily.  0     atorvastatin (LIPITOR) 40 MG tablet TAKE 1 TABLET(40 MG) BY MOUTH DAILY 90 tablet 1     carvediloL (COREG) 12.5 MG tablet Take 1 tablet (12.5 mg total) by mouth 2 (two) times a day with meals. 180 tablet 2     loratadine (CLARITIN) 10 mg tablet Take 10 mg by mouth daily as needed.        lisinopriL (PRINIVIL,ZESTRIL) 10 MG tablet Take 1 tablet (10 mg total) by mouth daily. 90 tablet 3     No current facility-administered medications for this visit.       Objective:   Vital Signs:   Visit Vitals  /80   Pulse (!) 57   Temp 98.8  F (37.1  C) (Oral)   Resp 16   Ht 6' 1\" (1.854 m)   Wt 212 lb 12.8 oz (96.5 kg)   BMI 28.08 kg/m           VisionScreening:  No exam data present     PHYSICAL EXAM    General appearance: alert, appears stated age and cooperative  Head: Normocephalic, without obvious abnormality, atraumatic  Eyes: conjunctivae/corneas clear. PERRL, EOM's intact.   Ears: normal TM's and external ear canals both ears  Nose: Nares normal. Septum midline. Mucosa normal.  Throat: lips, mucosa, and tongue normal; teeth and gums normal  Neck: no adenopathy, supple, symmetrical, trachea midline   Back: symmetric, no curvature. ROM normal.  Lungs: clear to auscultation bilaterally  Heart: regular rate and rhythm, S1, S2 normal, no murmur, click, rub or gallop  Abdomen: soft, non-tender  Extremities: extremities normal, atraumatic, no cyanosis or edema  Skin: Skin color, texture, turgor normal  There are multiple scaly " lesions on his scalp  Lymph nodes: Cervical nodes normal.  Neurologic: Alert and oriented X 3.    Cardiology:    NM Pharmacologic Stress Test  Order# [id number]  Reading physician: Lulú Aburto MD Ordering physician: Marline Ohara MD Study date: 20   Patient Information    Patient Name   Harrison Barrientos MRN   [id number] Sex   Male  1   1950 (69 y.o.)   EKG Scan   Stress Test Data - Scan on 20 9:59 AM by   Indications    Coronary atherosclerosis   Ischemic cardiomyopathy   Conclusion          A prior study was conducted on 10/25/2013.  This study has no change when compared with the prior study.     Lexiscan stress ECG is negative for inducible myocardial ischemia.     Lexiscan nuclear stress test is abnormal.  There is previous transmural myocardial infarction in mid to apical segments and anteroseptal segment. There is no reversible change indicating inducible myocardial ischemia.     Normal left ventricular size with akinesis in mid to apical anterior wall and distal anteroseptal segment.     The patient is at an intermediate risk of future cardiac ischemic events.            Assessment Results 2020   Activities of Daily Living No help needed   Instrumental Activities of Daily Living No help needed   Mini Cog Total Score 5   Some recent data might be hidden     A Mini-Cog score of 0-2 suggests the possibility of dementia, score of 3-5 suggests no dementia    Identified Health Risks:     Information regarding advance directives (living velázquez), including where he can download the appropriate form, was provided to the patient via the AVS.

## 2021-06-14 NOTE — PROGRESS NOTES
Assessment and Plan:       1. Welcome to Medicare preventive visit    An ECG was performed.  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  This is due again in 2019    2. Coronary atherosclerosis  3. Ischemic cardiomyopathy    Laboratory testing done  Patient will follow up with Dr. Ohara, cardiology  - Basic Metabolic Panel  - Hepatic Profile  - Lipid Cascade  - HM2(CBC w/o Differential)    4. BPH (benign prostatic hyperplasia)  5. Serology Prostate-specific Antigen (PSA) Elevated    Status post TURP    Follow-up with urology        6. Squamous Cell Carcinoma Of The Skin    Recommend regular follow-up with dermatology      The patient's current medical problems were reviewed.    I have had an Advance Directives discussion with the patient.  The following health maintenance schedule was reviewed with the patient and provided in printed form in the after visit summary:   Health Maintenance   Topic Date Due     ADVANCE DIRECTIVES DISCUSSED WITH PATIENT  08/31/2014     PNEUMOCOCCAL POLYSACCHARIDE VACCINE AGE 65 AND OVER  07/29/2017     FALL RISK ASSESSMENT  07/29/2017     INFLUENZA VACCINE RULE BASED (1) 08/01/2017     COLONOSCOPY  09/21/2019     TD 18+ HE  07/14/2021     TDAP ADULT ONE TIME DOSE  Completed     PNEUMOCOCCAL CONJUGATE VACCINE FOR ADULTS (PCV13 OR PREVNAR)  Completed     ZOSTER VACCINE  Completed        Subjective:   Chief Complaint: Harrison Barrientos is an 67 y.o. male here for a Welcome to Medicare visit.     HPI: This is a pleasant 67-year-old male who presents to clinic for a welcome to Medicare examination.  He has a history of coronary artery disease and known ischemic cardiomyopathy who presents to the clinic for a complete  physical examination.  He has a history of benign prostatic hypertrophy and has had obstructive symptoms and also has had an elevated PSA noted in the past.  He is followed by [facility].  In the past he has been treated with ciprofloxacin for prostatitis.  He does have plans to follow up with urology as he experiences urinary frequency and decreased urinary flow.  He has been taking Rapaflo.  He previously got up at least 3 or 4 times at night and notes that he has to urinate on the hour in the morning.    As a result he was treated with a laser TURP and his symptoms have improved considerably.    Regarding his heart history, he had a previous myocardial infarction in 2004. He had single vessel coronary artery disease and a coronary stent was placed in the proximal LAD. He has had follow-up and an echocardiogram showed an ejection fraction 38%.  His echocardiogram last year revealed an ejection fraction of 35%. He continues to follow-up with cardiology and will see Dr. nance in the near future.  His cardiac MRI revealed an ejection fraction of 41% in 2015.  He describes his health as being good.  He gets exercise 0-2 times per week.  He does have regular alcohol.  He has no concerns with being able to perform his activities of daily living.  His last colonoscopy was in September 2009.  This revealed mild diverticulosis but was otherwise normal.    Review of Systems:    Please see above.  The rest of the review of systems are negative for all systems.    Patient Care Team:  Brad Roldan MD as PCP - General     Patient Active Problem List   Diagnosis     Squamous Cell Carcinoma Of The Skin     Diverticulosis     Joint Pain, Localized In The Shoulder     Fatigue     Hyperlipidemia     Coronary Artery Disease     Ischemic cardiomyopathy     Neck Pain     BPH (benign prostatic hyperplasia)     Benign Prostatic Hypertrophy With Urinary Obstruction     Serology Prostate-specific Antigen (PSA) Elevated      Past Medical History:   Diagnosis Date     BPH (benign prostatic hyperplasia)      Coronary artery disease      Diverticulosis      Hyperlipidemia      Ischemic cardiomyopathy      Myocardial infarction 2004     Squamous cell carcinoma of skin       Past Surgical History:   Procedure Laterality Date     CORONARY STENT PLACEMENT       CYSTOSCOPY PROSTATE W/ LASER N/A 11/29/2016    Procedure: CYSTOSCOPY WITH TRANSURETHRAL RESECTION OF THE PROSTATE WITH QUANTA LASER;  Surgeon: Tre Cuadra MD;  Location: [facility];  Service:       Family History   Problem Relation Age of Onset     Heart disease Mother      coronary stents     Benign prostatic hyperplasia Father      Heart disease Father      Colon cancer Father      Kidney cancer Father      Benign prostatic hyperplasia Paternal Uncle      Benign prostatic hyperplasia Paternal Grandfather       Social History     Social History     Marital status:      Spouse name: N/A     Number of children: N/A     Years of education: N/A     Occupational History     Not on file.     Social History Main Topics     Smoking status: Never Smoker     Smokeless tobacco: Never Used     Alcohol use Yes      Comment: occasionally     Drug use: No     Sexual activity: Not on file     Other Topics Concern     Not on file     Social History Narrative       Current Outpatient Prescriptions   Medication Sig Dispense Refill     atorvastatin (LIPITOR) 40 MG tablet Take 1 tablet (40 mg total) by mouth daily. 90 tablet 3     carvedilol (COREG) 12.5 MG tablet TAKE 1 TABLET BY MOUTH TWICE DAILY WITH MEALS 180 tablet 2     lisinopril (PRINIVIL,ZESTRIL) 5 MG tablet TAKE 1 TABLET BY MOUTH DAILY 90 tablet 2     loratadine (CLARITIN) 10 mg tablet Take 10 mg by mouth daily as needed.        tadalafil (CIALIS) 10 MG tablet Take 10 mg by mouth daily as needed for erectile dysfunction.       No current facility-administered medications for this visit.       Objective:   Vital  "Signs:   Visit Vitals     /72     Pulse (!) 52     Temp 97.7  F (36.5  C) (Oral)     Resp 16     Ht 6' 1\" (1.854 m)     Wt 209 lb (94.8 kg)     BMI 27.57 kg/m2        EKG:  Sinus bradycardia with a left anterior fascicular block    VisionScreening:   Visual Acuity Screening    Right eye Left eye Both eyes   Without correction:      With correction: 20/25 20/20 20/20        PHYSICAL EXAM  General appearance: alert, appears stated age and cooperative  Head: Normocephalic, without obvious abnormality, atraumatic  Eyes: conjunctivae/corneas clear. PERRL, EOM's intact.   Ears: normal TM's and external ear canals both ears  Nose: Nares normal. Septum midline. Mucosa normal. No drainage or sinus tenderness.  Throat: lips, mucosa, and tongue normal; teeth and gums normal  Neck: no adenopathy, supple, symmetrical, trachea midline and thyroid not enlarged, symmetric, no tenderness/mass/nodules  Back: symmetric, no curvature. ROM normal. No CVA tenderness.  Lungs: clear to auscultation bilaterally  Heart: regular rate and rhythm, S1, S2 normal, no murmur, click, rub or gallop  Abdomen: soft, non-tender; bowel sounds normal; no masses,  no organomegaly  Genitourinary: Deferred  Rectal: Deferred  Extremities: extremities normal, atraumatic, no cyanosis or edema  Skin: Skin color, texture, turgor normal. No rashes or lesions  Lymph nodes: Cervical nodes normal.  Neurologic: Alert and oriented X 3. Normal coordination and gait      Assessment Results 11/9/2017   Activities of Daily Living No help needed   Instrumental Activities of Daily Living No help needed   Mini Cog Total Score 5   Some recent data might be hidden     A Mini Cog score of 0-2 suggests the possibility of dementia, score of 3-5 suggests no dementia    Identified Health Risks:     He is at risk for lack of exercise and has been provided with information to increase physical activity for the benefit of his well-being.  Information regarding advance directives " (living velázquez), including where he can download the appropriate form, was provided to the patient via the AVS.

## 2021-06-16 PROBLEM — D12.5 ADENOMATOUS POLYP OF SIGMOID COLON: Status: ACTIVE | Noted: 2019-11-09

## 2021-06-17 NOTE — PATIENT INSTRUCTIONS - HE
Patient Instructions by Brad Roldan MD at 3/22/2019 10:40 AM     Author: Brad Roldan MD Service: -- Author Type: Physician    Filed: 3/22/2019 11:30 AM Encounter Date: 3/22/2019 Status: Addendum    : Brad Roldan MD (Physician)    Related Notes: Original Note by Brad Roldan MD (Physician) filed at 3/22/2019 11:24 AM       Consider the Shingrix/ shingles vaccine  Your colonoscopy is due in September of this year  Great job with staying active  Follow-up with Dr. Ohara, cardiology      Patient Education   Understanding USDA MyPlate  The USDA (US Department of Agriculture) has guidelines to help you make healthy food choices. These are called MyPlate. MyPlate shows the food groups that make up healthy meals using the image of a place setting. Before you eat, think about the healthiest choices for what to put onto your plate or into your cup or bowl. To learn more about building a healthy plate, visit www.choosemyplate.gov.       The Food Groups    Fruits: Any fruit or 100% fruit juice counts as part of the Fruit Group. Fruits may be fresh, canned, frozen, or dried, and may be whole, cut-up, or pureed. Make half your plate fruits and vegetables.    Vegetables: Any vegetable or 100% vegetable juice counts as a member of the Vegetable Group. Vegetables may be fresh, frozen, canned, or dried. They can be served raw or cooked and may be whole, cut-up, or mashed. Make half your plate fruits and vegetables.     Grains: All foods made from grains are part of the Grains Group. These include wheat, rice, oats, cornmeal, and barley such as bread, pasta, oatmeal, cereal, tortillas, and grits. Grains should be no more than a quarter of your plate. At least half of your grains should be whole grains.    Protein: This group includes meat, poultry, seafood, beans and peas, eggs, processed soy products (like tofu), nuts (including nut butters), and seeds. Make protein choices  no more than a quarter of your plate. Meat and poultry choices should be lean or low fat.    Dairy: All fluid milk products and foods made from milk that contain calcium, like yogurt and cheese are part of the Dairy Group. (Foods that have little calcium, such as cream, butter, and cream cheese, are not part of the group.) Most dairy choices should be low-fat or fat-free.    Oils: These are fats that are liquid at room temperature. They include canola, corn, olive, soybean, and sunflower oil. Foods that are mainly oil include mayonnaise, certain salad dressings, and soft margarines. You should have only 5 to 7 teaspoons of oils a day. You probably already get this much from the food you eat.  Use Flare Code to Help Build Your Meals  The [employer] can help you plan and track your meals and activity. You can look up individual foods to see or compare their nutritional value. You can get guidelines for what and how much you should eat. You can compare your food choices. And you can assess personal physical activities and see ways you can improve. Go to www.Medikal.com.gov/supertracker/.    7507-1826 [employer]. [address] [place] [address]. All rights reserved. This information is not intended as a substitute for professional medical care. Always follow your healthcare professional's instructions.           Patient Education   Understanding Advance Care Planning  Advance care planning is the process of deciding ones own future medical care. It helps ensure that if you cant speak for yourself, your wishes can still be carried out. The plan is a series of legal documents that note a persons wishes. The documents vary by state. Advance care planning may be done when a person has a serious illness that is expected to get worse. It may be done before major surgery. And it can help you and your family be prepared in case of a major illness or injury. Advance care planning helps with making  decisions at these times.       A health care proxy is a person who acts as the voice of a patient when the patient cant speak for himself or herself. The name of this role varies by state. It may be called a Durable Medical Power of  or Durable Power of  for Healthcare. It may be called an agent, surrogate, or advocate. Or it may be called a representative or decision maker. It is an official duty that is identified by a legal document. The document also varies by state.    Why Is Advance Care Planning Important?  If a person communicates their healthcare wishes:    They will be given medical care that matches their values and goals.    Their family members will not be forced to make decisions in a crisis with no guidance.  Creating a Plan  Making an advance care plan is often done in 3 steps:    Thinking about ones wishes. To create an advance care plan, you should think about what kind of medical treatment you would want if you lose the ability to communicate. Are there any situations in which you would refuse or stop treatment? Are there therapies you would want or not want? And whom do you want to make decisions for you? There are many places to learn more about how to plan for your care. Ask your doctor or  for resources.    Picking a health care proxy. This means choosing a trusted person to speak for you only when you cant speak for yourself. When you cannot make medical decisions, your proxy makes sure the instructions in your advance care plan are followed. A proxy does not make decisions based on his or her own opinions. They must put aside those opinions and values if needed, and carry out your wishes.    Filling out the legal documents. There are several kinds of legal documents for advance care planning. Each one tells health care providers your wishes. The documents may vary by state. They must be signed and may need to be witnessed or notarized. You can cancel or change  them whenever you wish. Depending on your state, the documents may include a Healthcare Proxy form, Living Will, Durable Medical Power of , Advance Directive, or others.  The Familys Role  The best help a family can give is to support their loved ones wishes. Open and honest communication is vital. Family should express any concerns they have about the patients choices while the patient can still make decisions.    7982-5911 [employer]. [address] [place] [address], [place] [address]. All rights reserved. This information is not intended as a substitute for professional medical care. Always follow your healthcare professional's instructions.         Also, [facility] [place] offers a free, downloadable health care directive that allows you to share your treatment choices and personal preferences if you cannot communicate your wishes. It also allows you to appoint another person (called a health care agent) to make health care decisions if you are unable to do so. You can download an advance directive by going here: [email or web address]     Patient Education   Personalized Prevention Plan  You are due for the preventive services outlined below.  Your care team is available to assist you in scheduling these services.  If you have already completed any of these items, please share that information with your care team to update in your medical record.  Health Maintenance   Topic Date Due   ? ZOSTER VACCINES (2 of 3) 09/07/2012   ? FALL RISK ASSESSMENT  07/29/2017   ? COLONOSCOPY  09/21/2019   ? TD 18+ HE  07/14/2021   ? ADVANCE DIRECTIVES DISCUSSED WITH PATIENT  11/09/2022   ? PNEUMOCOCCAL POLYSACCHARIDE VACCINE AGE 65 AND OVER  Completed   ? INFLUENZA VACCINE RULE BASED  Completed   ? PNEUMOCOCCAL CONJUGATE VACCINE FOR ADULTS (PCV13 OR PREVNAR)  Completed

## 2021-06-17 NOTE — PATIENT INSTRUCTIONS - HE
Patient Instructions by Teofilo Emmanuel MD at 1/2/2020  9:20 AM     Author: Teofilo Emmanuel MD Service: -- Author Type: Physician    Filed: 1/2/2020 11:04 AM Encounter Date: 1/2/2020 Status: Addendum    : Teofilo Emmanuel MD (Physician)    Related Notes: Original Note by Teofilo Emmanuel MD (Physician) filed at 1/2/2020 11:03 AM       -Take the full course of Augmentin as prescribed.  -Take a probiotic while taking this antibiotic.  This can be purchased over the counter at your local pharmacy.  Patient Education     Sinusitis (Antibiotic Treatment)    The sinuses are air-filled spaces within the bones of the face. They connect to the inside of the nose. Sinusitis is an inflammation of the tissue that lines the sinuses. Sinusitis can occur during a cold. It can also happen due to allergies to pollens and other particles in the air. Sinusitis can cause symptoms of sinus congestion and a feeling of fullness. A sinus infection causes fever, headache, and facial pain. There is often green or yellow fluid draining from the nose or into the back of the throat (post-nasal drip). You have been given antibiotics to treat this condition.  Home care    Take the full course of antibiotics as instructed. Do not stop taking them, even when you feel better.    Drink plenty of water, hot tea, and other liquids. This may help thin nasal mucus. It also may help your sinuses drain fluids.    Heat may help soothe painful areas of your face. Use a towel soaked in hot water. Or,  the shower and direct the warm spray onto your face. Using a vaporizer along with a menthol rub at night may also help soothe symptoms.     An expectorant with guaifenesin may help thin nasal mucus and help your sinuses drain fluids.    You can use an over-the-counter decongestant, unless a similar medicine was prescribed to you. Nasal sprays work the fastest. Use one that contains phenylephrine or oxymetazoline. First blow your nose  gently. Then use the spray. Do not use these medicines more often than directed on the label. If you do, your symptoms may get worse. You may also take pills that contain pseudoephedrine. Dont use products that combine multiple medicines. This is because side effects may be increased. Read labels. You can also ask the pharmacist for help. (People with high blood pressure should not use decongestants. They can raise blood pressure.)    Over-the-counter antihistamines may help if allergies contributed to your sinusitis.      Do not use nasal rinses or irrigation during an acute sinus infection, unless your healthcare provider tells you to. Rinsing may spread the infection to other areas in your sinuses.    Use acetaminophen or ibuprofen to control pain, unless another pain medicine was prescribed to you. If you have chronic liver or kidney disease or ever had a stomach ulcer, talk with your healthcare provider before using these medicines. (Aspirin should never be taken by anyone under age 18 who is ill with a fever. It may cause severe liver damage.)    Don't smoke. This can make symptoms worse.  Follow-up care  Follow up with your healthcare provider or our staff if you are better in 1 week.  When to seek medical advice  Call your healthcare provider if any of these occur:    Facial pain or headache that gets worse    Stiff neck    Unusual drowsiness or confusion    Swelling of your forehead or eyelids    Vision problems, such as blurred or double vision    Fever of 100.4 F (38 C) or higher, or as directed by your healthcare provider    Seizure    Breathing problems    Symptoms don't go away in 10 days  Prevention  Here are steps you can take to help prevent an infection:    Keep good hand washing habits.    Dont have close contact with people who have sore throats, colds, or other upper respiratory infections.    Dont smoke, and stay away from secondhand smoke.    Stay up to date with of your vaccines.  Date Last  Reviewed: 11/1/2017 2000-2017 [employer]. [address] [place] [address], [place] [address]. All rights reserved. This information is not intended as a substitute for professional medical care. Always follow your healthcare professional's instructions.

## 2021-06-17 NOTE — PROGRESS NOTES
Assessment/ Plan     1. Chronic pain of right knee    Pain is secondary to osteoarthritis     X-rays of the right knee show medial joint space narrowing   X-rays were personally reviewed and will be reviewed by radiology    Recommend follow-up with orthopedics  He may benefit from a cortisone injection or possible Synvisc injection  He may take Tylenol in the meantime or ibuprofen sparingly  Consider quadricep strengthening  - XR Knee Right 1 or 2 VWS  - Ambulatory referral to Orthopedics    Spent 30 minutes with chart preparation as well as review of the x-rays and treatment plan      Subjective:       Harrison Barrientos is a 70 y.o. male who presents to the clinic for chronic right knee pain.  He has had ongoing pain in the right knee and this has increased recently with activity.  He denies recent injury and has not had swelling.  His knee does not buckle on him.  He can have some pain that radiates from the anterior aspect of his knee to his right shin.  He states he had x-rays 10 years ago that showed some joint space narrowing.  He has been able to manage but this has been worsening recently.    The following portions of the patient's history were reviewed and updated as appropriate: allergies, current medications, past family history, past medical history, past social history, past surgical history and problem list. Medications have been reconciled    Review of Systems   A 12 point comprehensive review of systems was negative except as noted.      Current Outpatient Medications   Medication Sig Dispense Refill     aspirin 81 MG EC tablet Take 1 tablet (81 mg total) by mouth daily.  0     atorvastatin (LIPITOR) 40 MG tablet TAKE ONE TABLET BY MOUTH ONCE DAILY 90 tablet 0     carvediloL (COREG) 12.5 MG tablet TAKE ONE TABLET BY MOUTH TWICE A DAY WITH MEALS 180 tablet 0     lisinopriL (PRINIVIL,ZESTRIL) 10 MG tablet Take 1 tablet (10 mg total) by mouth daily. 90 tablet 3     loratadine (CLARITIN) 10 mg tablet Take  10 mg by mouth daily as needed.        No current facility-administered medications for this visit.        Objective:      /81 (Patient Site: Left Arm, Patient Position: Sitting, Cuff Size: Adult Regular)   Pulse (!) 57   Temp 97.1  F (36.2  C) (Oral)   Resp 16   Wt 215 lb (97.5 kg)   BMI 27.60 kg/m      General appearance: alert, appears stated age   Head: normocephalic, without obvious abnormality, atraumatic  Extremities: extremities normal, atraumatic, no cyanosis or edema  There is no obvious effusion  There is no significant joint space narrowing  Lachman's test is negative  Skin: skin color, texture, turgor normal  Lymph nodes: Cervical nodes normal.  Neurologic: Alert and oriented X 3           No results found for this or any previous visit (from the past 168 hour(s)).       This note has been dictated using voice recognition software. Any grammatical or context distortions are unintentional and inherent to the software    Brad Roldan MD

## 2021-06-18 NOTE — PATIENT INSTRUCTIONS - HE
Patient Instructions by Brad Roldan MD at 12/14/2020  3:00 PM     Author: Brad Roldan MD Service: -- Author Type: Physician    Filed: 12/14/2020  3:48 PM Encounter Date: 12/14/2020 Status: Addendum    : Brad Roldan MD (Physician)    Related Notes: Original Note by Brad Roldan MD (Physician) filed at 12/14/2020  3:41 PM         Patient Education   Understanding Advance Care Planning  Advance care planning is the process of deciding ones own future medical care. It helps ensure that if you cant speak for yourself, your wishes can still be carried out. The plan is a series of legal documents that note a persons wishes. The documents vary by state. Advance care planning may be done when a person has a serious illness that is expected to get worse. It may be done before major surgery. And it can help you and your family be prepared in case of a major illness or injury. Advance care planning helps with making decisions at these times.       A health care proxy is a person who acts as the voice of a patient when the patient cant speak for himself or herself. The name of this role varies by state. It may be called a Durable Medical Power of  or Durable Power of  for Healthcare. It may be called an agent, surrogate, or advocate. Or it may be called a representative or decision maker. It is an official duty that is identified by a legal document. The document also varies by state.    Why Is Advance Care Planning Important?  If a person communicates their healthcare wishes:    They will be given medical care that matches their values and goals.    Their family members will not be forced to make decisions in a crisis with no guidance.  Creating a Plan  Making an advance care plan is often done in 3 steps:    Thinking about ones wishes. To create an advance care plan, you should think about what kind of medical treatment you would want if you lose the  ability to communicate. Are there any situations in which you would refuse or stop treatment? Are there therapies you would want or not want? And whom do you want to make decisions for you? There are many places to learn more about how to plan for your care. Ask your doctor or  for resources.    Picking a health care proxy. This means choosing a trusted person to speak for you only when you cant speak for yourself. When you cannot make medical decisions, your proxy makes sure the instructions in your advance care plan are followed. A proxy does not make decisions based on his or her own opinions. They must put aside those opinions and values if needed, and carry out your wishes.    Filling out the legal documents. There are several kinds of legal documents for advance care planning. Each one tells health care providers your wishes. The documents may vary by state. They must be signed and may need to be witnessed or notarized. You can cancel or change them whenever you wish. Depending on your state, the documents may include a Healthcare Proxy form, Living Will, Durable Medical Power of , Advance Directive, or others.  The Familys Role  The best help a family can give is to support their loved ones wishes. Open and honest communication is vital. Family should express any concerns they have about the patients choices while the patient can still make decisions.    5191-2490 [employer]. [address] [place] [address], [place] [address]. All rights reserved. This information is not intended as a substitute for professional medical care. Always follow your healthcare professional's instructions.         Also, [facility] [place] offers a free, downloadable health care directive that allows you to share your treatment choices and personal preferences if you cannot communicate your wishes. It also allows you to appoint another person (called a health care agent) to make health care  decisions if you are unable to do so. You can download an advance directive by going here: [email or web address]     Patient Education   Personalized Prevention Plan  You are due for the preventive services outlined below.  Your care team is available to assist you in scheduling these services.  If you have already completed any of these items, please share that information with your care team to update in your medical record.  Health Maintenance   Topic Date Due   ? HEPATITIS C SCREENING  1950   ? TD 18+ HE  07/14/2021   ? MEDICARE ANNUAL WELLNESS VISIT  12/14/2021   ? FALL RISK ASSESSMENT  12/14/2021   ? LIPID  03/22/2024   ? ADVANCE CARE PLANNING  03/22/2024   ? COLORECTAL CANCER SCREENING  10/25/2024   ? Pneumococcal Vaccine: 65+ Years  Completed   ? INFLUENZA VACCINE RULE BASED  Completed   ? ZOSTER VACCINES  Completed   ? Pneumococcal Vaccine: Pediatrics (0 to 5 Years) and At-Risk Patients (6 to 64 Years)  Aged Out   ? HEPATITIS B VACCINES  Aged Out      We will check your urine test for blood  I do encourage follow-up with your urologist  Continue to follow-up with Dr. Ohara  We will check your urine tests as well

## 2021-06-25 NOTE — PROGRESS NOTES
Progress Notes by Marline Ohara MD at 11/20/2017  9:30 AM     Author: Marline Ohara MD Service: -- Author Type: Physician    Filed: 11/20/2017 10:18 AM Encounter Date: 11/20/2017 Status: Signed    : Marline Ohara MD (Physician)           Click to link to [facility] HEART CARE NOTE    Thank you, Dr. Roldan, for asking us to see Harrison Barrientos at the [facility].      Assessment/Recommendations   Assessment:    1.  Coronary artery disease: Status post anterior myocardial infarction in 2004.    He has been exercising occasionally and has not noted any problems with chest pain or breathing difficulty.  Continue on daily aspirin.  2.  Hypertension: Well-controlled.  3.  Dyslipidemia: Well controlled on statin therapy.    4.    Ischemic cardiomyopathy: LVEF of 41% on his cardiac MRI in 2015.  Repeat echocardiogram again this year.    Follow up with me in one year.       History of Present Illness    Mr. Harrison aBrrientos is a 67 y.o. male with history of ischemic cardiomyopathy with an LVF of 41% status post anterior MI in 2004 here for follow-up.  His echocardiogram showed an LVEF of 35% in 2015 and he underwent a cardiac MRI that showed LVEF of 41% with evidence of transmural infarction involving the anterior and anteroseptal wall.  He has continued to exercise regularly.  He has not noted any problems with chest discomfort or breathing difficulty.  He reports he has been feeling very well.       Physical Examination Review of Systems   Vitals:    11/20/17 0951   BP: 102/64   Pulse: (!) 58   Resp: 12     Body mass index is 26.75 kg/(m^2).  Wt Readings from Last 3 Encounters:   11/20/17 211 lb 3.2 oz (95.8 kg)   11/09/17 209 lb (94.8 kg)   01/05/17 208 lb (94.3 kg)       General Appearance:   alert, no apparent distress   HEENT:  no scleral icterus; the mucous membranes are pink and moist                                  Neck: jugular venous  pressure normal   Chest: the spine is straight and the chest is symmetric   Lungs:   respirations unlabored; the lungs are clear to auscultation   Cardiovascular:   regular rhythm with normal first and second heart sounds and no murmurs or gallops   Abdomen:  no organomegaly, masses, bruits, or tenderness; bowel sounds are present   Extremities: no cyanosis, clubbing, or edema   Skin: no xanthelasma    General: WNL  Eyes: WNL  Ears/Nose/Throat: WNL  Lungs: WNL  Heart: WNL  Stomach: WNL  Bladder: WNL  Muscle/Joints: WNL  Skin: WNL  Nervous System: WNL  Mental Health: WNL     Blood: WNL     Medical History  Surgical History Family History Social History   Past Medical History:   Diagnosis Date   ? BPH (benign prostatic hyperplasia)    ? Coronary artery disease    ? Diverticulosis    ? Hyperlipidemia    ? Ischemic cardiomyopathy    ? Myocardial infarction 2004   ? Squamous cell carcinoma of skin     Past Surgical History:   Procedure Laterality Date   ? CORONARY STENT PLACEMENT     ? CYSTOSCOPY PROSTATE W/ LASER N/A 11/29/2016    Procedure: CYSTOSCOPY WITH TRANSURETHRAL RESECTION OF THE PROSTATE WITH QUANTA LASER;  Surgeon: Tre Cuadra MD;  Location: [facility];  Service:     Family History   Problem Relation Age of Onset   ? Heart disease Mother      coronary stents   ? Benign prostatic hyperplasia Father    ? Heart disease Father    ? Kidney cancer Father    ? Benign prostatic hyperplasia Paternal Uncle    ? Benign prostatic hyperplasia Paternal Grandfather     Social History     Social History   ? Marital status:      Spouse name: N/A   ? Number of children: N/A   ? Years of education: N/A     Occupational History   ? Not on file.     Social History Main Topics   ? Smoking status: Never Smoker   ? Smokeless tobacco: Never Used   ? Alcohol use Yes      Comment: occasionally   ? Drug use: No   ? Sexual activity: Not on file     Other Topics Concern   ? Not on file     Social History  Narrative          Medications  Allergies   Current Outpatient Prescriptions   Medication Sig Dispense Refill   ? atorvastatin (LIPITOR) 40 MG tablet Take 1 tablet (40 mg total) by mouth daily. 90 tablet 3   ? carvedilol (COREG) 12.5 MG tablet TAKE 1 TABLET BY MOUTH TWICE DAILY WITH MEALS 180 tablet 2   ? lisinopril (PRINIVIL,ZESTRIL) 5 MG tablet TAKE 1 TABLET BY MOUTH DAILY 90 tablet 2   ? loratadine (CLARITIN) 10 mg tablet Take 10 mg by mouth daily as needed.      ? tadalafil (CIALIS) 10 MG tablet Take 1 tablet (10 mg total) by mouth daily as needed for erectile dysfunction. 15 tablet 5     No current facility-administered medications for this visit.       No Known Allergies      Lab Results    Chemistry/lipid CBC Cardiac Enzymes/BNP/TSH/INR   Lab Results   Component Value Date    CHOL 96 11/09/2017    HDL 28 (L) 11/09/2017    LDLCALC 56 11/09/2017    TRIG 58 11/09/2017    CREATININE 0.86 11/09/2017    BUN 19 11/09/2017    K 4.5 11/09/2017     11/09/2017     11/09/2017    CO2 31 11/09/2017    Lab Results   Component Value Date    WBC 6.4 11/09/2017    HGB 16.0 11/09/2017    HCT 48.8 11/09/2017    MCV 88 11/09/2017     11/09/2017    Lab Results   Component Value Date    TSH 1.9 07/25/2013

## 2021-06-27 NOTE — PROGRESS NOTES
Progress Notes by Marline Ohara MD at 1/14/2019  1:10 PM     Author: Marline Ohara MD Service: -- Author Type: Physician    Filed: 1/14/2019  2:06 PM Encounter Date: 1/14/2019 Status: Signed    : Marline Ohara MD (Physician)           Click to link to [facility] HEART CARE NOTE    Thank you, Dr. Roldan, for asking us to see Harrison Barrientos at the [facility].      Assessment/Recommendations   Assessment:    1.  Coronary artery disease: Status post anterior myocardial infarction in 2004.   No anginal complaints. Continue on daily aspirin.  2.  Hypertension:  Elevated today however normally better controlled.  3.  Dyslipidemia: Recent lipids reviewed and well controlled.  Due for repeat fasting lipids.  4.    Ischemic cardiomyopathy: Left ventricular ejection fraction of 40% on his cardiac MRI and his echocardiogram.  We will continue on carvedilol and lisinopril.  Follow up with me in one year.       History of Present Illness    Mr. Harrison Barrientos is a 68 y.o. male with history of ischemic cardiomyopathy with an LVF of 40% status post anterior MI in 2004 here for follow-up.  He reports that he has been doing well since I last saw him.  Denies any problems with breathing or chest pain.  He denies any orthopnea or edema.  He admits to poor exercise and is going to try to start exercising more regularly.     Physical Examination Review of Systems   Vitals:    01/14/19 1307   BP: 138/78   Pulse: (!) 56   Resp: 10     Body mass index is 27.6 kg/m .  Wt Readings from Last 3 Encounters:   01/14/19 215 lb (97.5 kg)   11/22/17 211 lb (95.7 kg)   11/20/17 211 lb 3.2 oz (95.8 kg)       General Appearance:   alert, no apparent distress   HEENT:  no scleral icterus; the mucous membranes are pink and moist                                  Neck: jugular venous pressure normal, no thyromegaly   Chest: the spine is straight and the chest is symmetric    Lungs:   respirations unlabored; the lungs are clear to auscultation   Cardiovascular:   regular rhythm with normal first and second heart sounds and no murmurs or gallops;  there are no carotid bruits.   Abdomen:  no organomegaly, masses, bruits, or tenderness; bowel sounds are present   Extremities: no cyanosis, clubbing, or edema   Skin: no xanthelasma    General: WNL     Ears/Nose/Throat: WNL  Lungs: WNL  Heart: WNL  Stomach: Constipation  Bladder: WNL  Muscle/Joints: WNL  Skin: WNL  Nervous System: WNL  Mental Health: WNL     Blood: WNL     Medical History  Surgical History Family History Social History   Past Medical History:   Diagnosis Date   ? BPH (benign prostatic hyperplasia)    ? Coronary artery disease    ? Diverticulosis    ? Hyperlipidemia    ? Ischemic cardiomyopathy    ? Myocardial infarction (H) 2004   ? Squamous cell carcinoma of skin     Past Surgical History:   Procedure Laterality Date   ? CORONARY STENT PLACEMENT     ? CYSTOSCOPY PROSTATE W/ LASER N/A 11/29/2016    Procedure: CYSTOSCOPY WITH TRANSURETHRAL RESECTION OF THE PROSTATE WITH QUANTA LASER;  Surgeon: Tre Cuadra MD;  Location: [facility];  Service:     Family History   Problem Relation Age of Onset   ? Heart disease Mother         coronary stents   ? Benign prostatic hyperplasia Father    ? Heart disease Father    ? Kidney cancer Father    ? Benign prostatic hyperplasia Paternal Uncle    ? Benign prostatic hyperplasia Paternal Grandfather     Social History     Socioeconomic History   ? Marital status:      Spouse name: Not on file   ? Number of children: Not on file   ? Years of education: Not on file   ? Highest education level: Not on file   Social Needs   ? Financial resource strain: Not on file   ? Food insecurity - worry: Not on file   ? Food insecurity - inability: Not on file   ? Transportation needs - medical: Not on file   ? Transportation needs - non-medical: Not on file   Occupational History    ? Not on file   Tobacco Use   ? Smoking status: Never Smoker   ? Smokeless tobacco: Never Used   Substance and Sexual Activity   ? Alcohol use: Yes     Comment: occasionally   ? Drug use: No   ? Sexual activity: Not on file   Other Topics Concern   ? Not on file   Social History Narrative   ? Not on file          Medications  Allergies   Current Outpatient Medications   Medication Sig Dispense Refill   ? atorvastatin (LIPITOR) 40 MG tablet Take 1 tablet (40 mg total) by mouth daily. 90 tablet 0   ? carvedilol (COREG) 12.5 MG tablet TAKE 1 TABLET BY MOUTH TWICE DAILY WITH MEALS 180 tablet 1   ? lisinopril (PRINIVIL,ZESTRIL) 5 MG tablet TAKE 1 TABLET BY MOUTH DAILY 90 tablet 2   ? loratadine (CLARITIN) 10 mg tablet Take 10 mg by mouth daily as needed.      ? tadalafil (CIALIS) 10 MG tablet Take 1 tablet (10 mg total) by mouth daily as needed for erectile dysfunction. 15 tablet 5   ? aspirin 81 MG EC tablet Take 1 tablet (81 mg total) by mouth daily.  0     No current facility-administered medications for this visit.       No Known Allergies      Lab Results    Chemistry/lipid CBC Cardiac Enzymes/BNP/TSH/INR   Lab Results   Component Value Date    CHOL 96 11/09/2017    HDL 28 (L) 11/09/2017    LDLCALC 56 11/09/2017    TRIG 58 11/09/2017    CREATININE 0.86 11/09/2017    BUN 19 11/09/2017    K 4.5 11/09/2017     11/09/2017     11/09/2017    CO2 31 11/09/2017    Lab Results   Component Value Date    WBC 6.4 11/09/2017    HGB 16.0 11/09/2017    HCT 48.8 11/09/2017    MCV 88 11/09/2017     11/09/2017    Lab Results   Component Value Date    TSH 1.9 07/25/2013

## 2021-06-28 NOTE — PROGRESS NOTES
Progress Notes by Marline Ohara MD at 1/14/2020  8:30 AM     Author: Marline Ohara MD Service: -- Author Type: Physician    Filed: 1/14/2020 10:17 AM Encounter Date: 1/14/2020 Status: Signed    : Marline Ohara MD (Physician)           Thank you, Dr. Roldan, for asking us to see Harirson Barrientos at the [facility].      Assessment/Recommendations   Assessment:    1.  Coronary artery disease: Status post anterior myocardial infarction in 2004.   No anginal complaints. Continue on daily aspirin.  2.  Hypertension: Well-controlled today  3.  Dyslipidemia: Recent lipids reviewed and well controlled.    4.    Ischemic cardiomyopathy: Left ventricular ejection fraction of 40% on his cardiac MRI and his echocardiogram.  Last echocardiogram done in 2017.    Plan:  1. Continue on carvedilol and lisinopril.  2.  Continue daily aspirin  3.  Repeat echocardiogram  Follow up with me in one year.     History of Present Illness    Mr. Harrison Barrientos is a 69 y.o. male with history of ischemic cardiomyopathy with an LVF of 40% status post anterior MI in 2004 here for follow-up.   He developed an upper respiratory tract infection and sinus infection last month that he has recovered from slowly.  Currently feeling healthier.  He has not been exercising as much due to right knee pain.  Prior to this he had been trying to do the elliptical 15 to 20 minutes every other day.   Denies any problems with chest pain or breathing difficulty.       Physical Examination Review of Systems   Vitals:    01/14/20 0833   BP: 118/66   Pulse: 69   Resp: 16     Body mass index is 27.46 kg/m .  Wt Readings from Last 3 Encounters:   01/14/20 211 lb (95.7 kg)   01/02/20 211 lb 14.4 oz (96.1 kg)   03/22/19 209 lb 8 oz (95 kg)       General Appearance:   alert, no apparent distress   HEENT:  no scleral icterus; the mucous membranes are pink and moist                                  Neck:  No JVD    Chest: the spine is straight and the chest is symmetric   Lungs:   respirations unlabored; the lungs are clear to auscultation   Cardiovascular:   regular rhythm with normal first and second heart sounds and no murmurs or gallops   Abdomen:  no organomegaly, masses, bruits, or tenderness; bowel sounds are present   Extremities: no cyanosis, clubbing, or edema   Skin: no xanthelasma    General: WNL  Eyes: WNL  Ears/Nose/Throat: WNL  Lungs: WNL  Heart: WNL  Stomach: WNL  Bladder: WNL  Muscle/Joints: WNL  Skin: WNL  Nervous System: WNL  Mental Health: WNL     Blood: WNL     Medical History  Surgical History Family History Social History   Past Medical History:   Diagnosis Date   ? BPH (benign prostatic hyperplasia)    ? Coronary artery disease    ? Diverticulosis    ? Hyperlipidemia    ? Ischemic cardiomyopathy    ? Myocardial infarction (H) 2004   ? Squamous cell carcinoma of skin     Past Surgical History:   Procedure Laterality Date   ? CORONARY STENT PLACEMENT     ? CYSTOSCOPY PROSTATE W/ LASER N/A 11/29/2016    Procedure: CYSTOSCOPY WITH TRANSURETHRAL RESECTION OF THE PROSTATE WITH QUANTA LASER;  Surgeon: Tre Cuadra MD;  Location: [facility] [place];  Service:     Family History   Problem Relation Age of Onset   ? Heart disease Mother         coronary stents   ? Benign prostatic hyperplasia Father    ? Heart disease Father    ? Kidney cancer Father    ? Benign prostatic hyperplasia Paternal Uncle    ? Benign prostatic hyperplasia Paternal Grandfather     Social History     Socioeconomic History   ? Marital status:      Spouse name: Not on file   ? Number of children: Not on file   ? Years of education: Not on file   ? Highest education level: Not on file   Occupational History   ? Not on file   Social Needs   ? Financial resource strain: Not on file   ? Food insecurity:     Worry: Not on file     Inability: Not on file   ? Transportation needs:     Medical: Not on file     Non-medical: Not on  file   Tobacco Use   ? Smoking status: Never Smoker   ? Smokeless tobacco: Never Used   Substance and Sexual Activity   ? Alcohol use: Yes     Comment: occasionally   ? Drug use: No   ? Sexual activity: Not on file   Lifestyle   ? Physical activity:     Days per week: Not on file     Minutes per session: Not on file   ? Stress: Not on file   Relationships   ? Social connections:     Talks on phone: Not on file     Gets together: Not on file     Attends Voodoo service: Not on file     Active member of club or organization: Not on file     Attends meetings of clubs or organizations: Not on file     Relationship status: Not on file   ? Intimate partner violence:     Fear of current or ex partner: Not on file     Emotionally abused: Not on file     Physically abused: Not on file     Forced sexual activity: Not on file   Other Topics Concern   ? Not on file   Social History Narrative   ? Not on file          Medications  Allergies   Current Outpatient Medications   Medication Sig Dispense Refill   ? aspirin 81 MG EC tablet Take 1 tablet (81 mg total) by mouth daily.  0   ? atorvastatin (LIPITOR) 40 MG tablet TAKE 1 TABLET(40 MG) BY MOUTH DAILY 90 tablet 0   ? carvedilol (COREG) 12.5 MG tablet TAKE 1 TABLET BY MOUTH TWICE DAILY WITH MEALS 180 tablet 0   ? lisinopril (PRINIVIL,ZESTRIL) 5 MG tablet TAKE 1 TABLET BY MOUTH DAILY 90 tablet 0   ? loratadine (CLARITIN) 10 mg tablet Take 10 mg by mouth daily as needed.        No current facility-administered medications for this visit.       No Known Allergies      Lab Results    Chemistry/lipid CBC Cardiac Enzymes/BNP/TSH/INR   Lab Results   Component Value Date    CHOL 101 03/22/2019    HDL 33 (L) 03/22/2019    LDLCALC 52 03/22/2019    TRIG 79 03/22/2019    CREATININE 0.94 03/22/2019    BUN 20 03/22/2019    K 4.5 03/22/2019     03/22/2019     03/22/2019    CO2 26 03/22/2019    Lab Results   Component Value Date    WBC 6.1 03/22/2019    HGB 16.2 03/22/2019    HCT  48.3 03/22/2019    MCV 88 03/22/2019     03/22/2019    Lab Results   Component Value Date    TSH 1.9 07/25/2013

## 2021-06-30 NOTE — PROGRESS NOTES
Progress Notes by Marline Ohara MD at 3/5/2021  7:50 AM     Author: Marline Ohara MD Service: -- Author Type: Physician    Filed: 3/5/2021  8:29 AM Encounter Date: 3/5/2021 Status: Signed    : Marline Ohara MD (Physician)           Thank you, Dr. Roldan, for asking us to see Harrison Barrientos at the [facility].      Assessment/Recommendations   Assessment:    1.  Coronary artery disease: Status post anterior myocardial infarction in 2004.   No anginal complaints. Continue on daily aspirin.  2.  Hypertension: Well-controlled today  3.  Dyslipidemia: Recent lipids reviewed and well controlled.  HDL low.  Continue with diet and exercise  4.    Ischemic cardiomyopathy: Left ventricular ejection fraction 40%     Plan:  1.  Will reevaluate left ventricular function with cardiac MRI next year  2.  Continue aspirin, high-dose statin therapy, carvedilol and lisinopril  3.  Follow-up in 1 year       History of Present Illness    Mr. Harrison Barrientos is a 70 y.o. male with history of ischemic cardiomyopathy with left ventricular ejection fraction of 40% status post anterior MI in 2004 here for follow-up.     Last year his echocardiogram suggested a decline in his left ventricular ejection fraction.  Lexiscan nuclear stress test demonstrated area of infarction without ischemia left ventricular ejection fraction 40%.  He has continued to feel well.  Denies any chest pain or breathing difficulty.  He exercises regularly on his elliptical machine.       Physical Examination Review of Systems   Vitals:    03/05/21 0745   BP: 122/66   Pulse: 72   Resp: 16     Body mass index is 27.6 kg/m .  Wt Readings from Last 3 Encounters:   03/05/21 215 lb (97.5 kg)   12/14/20 212 lb 12.8 oz (96.5 kg)   01/16/20 211 lb (95.7 kg)       General Appearance:   alert, no apparent distress   HEENT:  no scleral icterus; the mucous membranes are pink and moist                                   Neck: No jvd   Chest: the spine is straight and the chest is symmetric   Lungs:   respirations unlabored; the lungs are clear to auscultation   Cardiovascular:   regular rhythm with normal first and second heart sounds and no murmurs or gallops   Abdomen:  no organomegaly, masses, bruits, or tenderness; bowel sounds are present   Extremities: no edema   Skin: no xanthelasma    General: WNL  Eyes: WNL  Ears/Nose/Throat: WNL  Lungs: WNL  Heart: WNL  Stomach: WNL  Bladder: WNL  Muscle/Joints: WNL  Skin: WNL  Nervous System: WNL  Mental Health: WNL     Blood: WNL     Medical History  Surgical History Family History Social History   Past Medical History:   Diagnosis Date   ? BPH (benign prostatic hyperplasia)    ? Coronary artery disease    ? Diverticulosis    ? Hyperlipidemia    ? Ischemic cardiomyopathy    ? Myocardial infarction (H) 2004   ? Squamous cell carcinoma of skin     Past Surgical History:   Procedure Laterality Date   ? CORONARY STENT PLACEMENT     ? CYSTOSCOPY PROSTATE W/ LASER N/A 11/29/2016    Procedure: CYSTOSCOPY WITH TRANSURETHRAL RESECTION OF THE PROSTATE WITH QUANTA LASER;  Surgeon: Tre Cuadra MD;  Location: [facility];  Service:     Family History   Problem Relation Age of Onset   ? Heart disease Mother         coronary stents   ? Benign prostatic hyperplasia Father    ? Heart disease Father    ? Kidney cancer Father    ? Benign prostatic hyperplasia Paternal Uncle    ? Benign prostatic hyperplasia Paternal Grandfather     Social History     Socioeconomic History   ? Marital status:      Spouse name: Not on file   ? Number of children: Not on file   ? Years of education: Not on file   ? Highest education level: Not on file   Occupational History   ? Not on file   Social Needs   ? Financial resource strain: Not on file   ? Food insecurity     Worry: Not on file     Inability: Not on file   ? Transportation needs     Medical: Not on file     Non-medical: Not on file    Tobacco Use   ? Smoking status: Never Smoker   ? Smokeless tobacco: Never Used   Substance and Sexual Activity   ? Alcohol use: Yes     Comment: occasionally   ? Drug use: No   ? Sexual activity: Not on file   Lifestyle   ? Physical activity     Days per week: Not on file     Minutes per session: Not on file   ? Stress: Not on file   Relationships   ? Social connections     Talks on phone: Not on file     Gets together: Not on file     Attends Yarsani service: Not on file     Active member of club or organization: Not on file     Attends meetings of clubs or organizations: Not on file     Relationship status: Not on file   ? Intimate partner violence     Fear of current or ex partner: Not on file     Emotionally abused: Not on file     Physically abused: Not on file     Forced sexual activity: Not on file   Other Topics Concern   ? Not on file   Social History Narrative   ? Not on file          Medications  Allergies   Current Outpatient Medications   Medication Sig Dispense Refill   ? aspirin 81 MG EC tablet Take 1 tablet (81 mg total) by mouth daily.  0   ? atorvastatin (LIPITOR) 40 MG tablet TAKE ONE TABLET BY MOUTH ONCE DAILY 90 tablet 0   ? carvediloL (COREG) 12.5 MG tablet Take 1 tablet (12.5 mg total) by mouth 2 (two) times a day with meals. 180 tablet 2   ? lisinopriL (PRINIVIL,ZESTRIL) 10 MG tablet Take 1 tablet (10 mg total) by mouth daily. 90 tablet 3   ? loratadine (CLARITIN) 10 mg tablet Take 10 mg by mouth daily as needed.        No current facility-administered medications for this visit.       No Known Allergies      Lab Results    Chemistry/lipid CBC Cardiac Enzymes/BNP/TSH/INR   Lab Results   Component Value Date    CHOL 96 12/14/2020    HDL 33 (L) 12/14/2020    LDLCALC 49 12/14/2020    TRIG 70 12/14/2020    CREATININE 0.87 12/14/2020    BUN 17 12/14/2020    K 4.6 12/14/2020     12/14/2020     12/14/2020    CO2 26 12/14/2020    Lab Results   Component Value Date    WBC 5.7  12/14/2020    HGB 16.5 12/14/2020    HCT 49.1 12/14/2020    MCV 88 12/14/2020     12/14/2020    Lab Results   Component Value Date    TSH 1.9 07/25/2013

## 2021-09-18 ENCOUNTER — HEALTH MAINTENANCE LETTER (OUTPATIENT)
Age: 71
End: 2021-09-18

## 2021-10-13 ENCOUNTER — TRANSFERRED RECORDS (OUTPATIENT)
Dept: HEALTH INFORMATION MANAGEMENT | Facility: CLINIC | Age: 71
End: 2021-10-13

## 2021-10-27 DIAGNOSIS — Z11.59 ENCOUNTER FOR SCREENING FOR OTHER VIRAL DISEASES: ICD-10-CM

## 2021-11-03 ENCOUNTER — OFFICE VISIT (OUTPATIENT)
Dept: CARDIOLOGY | Facility: CLINIC | Age: 71
End: 2021-11-03
Payer: COMMERCIAL

## 2021-11-03 VITALS
SYSTOLIC BLOOD PRESSURE: 130 MMHG | RESPIRATION RATE: 14 BRPM | HEART RATE: 69 BPM | WEIGHT: 215 LBS | BODY MASS INDEX: 27.6 KG/M2 | DIASTOLIC BLOOD PRESSURE: 68 MMHG

## 2021-11-03 DIAGNOSIS — I25.5 ISCHEMIC CARDIOMYOPATHY: Primary | ICD-10-CM

## 2021-11-03 PROCEDURE — 99214 OFFICE O/P EST MOD 30 MIN: CPT | Performed by: INTERNAL MEDICINE

## 2021-11-03 NOTE — LETTER
11/3/2021    Brad Roldan MD  [address]  [facility] [address]    RE: Harrison Barrientos       Dear Colleague,    I had the pleasure of seeing Harrison Barrientos in the [facility].    HEART CARE ENCOUNTER CONSULTATON NOTE      [facility]  [phone]      Assessment/Recommendations   Assessment:    1.  Coronary artery disease: Status post anterior myocardial infarction in 2004.   No anginal complaints. Continue on daily aspirin.  2.  Hypertension: Well-controlled today  3.  Dyslipidemia: Recent lipids reviewed and well controlled  4.  Ischemic cardiomyopathy: Left ventricular ejection fraction 40% on stress testing.  Echocardiogram demonstrated left ventricular ejection fraction reduced to 32%.     Plan:  1.  Will reevaluate left ventricular function with cardiac MRI   2.  Continue aspirin, high-dose statin therapy, carvedilol and lisinopril  3.    Follow-up on results of cardiac MRI.  No cardiac contraindications to proceeding with knee surgery as planned.  No active anginal complaints or evidence of decompensated heart failure.  Reports good exercise tolerance without concerning symptoms.      Follow-up in 1 year     History of Present Illness/Subjective    HPI: Harrison Barrientos is a 71 year old male with history of ischemic cardiomyopathy with left ventricular ejection fraction of 40% status post anterior MI in 2004 here for follow-up.     Last year his echocardiogram suggested a decline in his left ventricular ejection fraction.  Lexiscan nuclear stress test 2/5/2020 demonstrated area of infarction without ischemia left ventricular ejection fraction 40%.    Is scheduled for right total knee replacement surgery in December.  He has remained active.  He is able to walk up and down stairs without problems with breathing or chest pain.  No increased edema either.    Lexiscan nuclear stress test 2/5/20     A prior study was conducted  on 10/25/2013.  This study has no change when compared with the prior study.     Lexiscan stress ECG is negative for inducible myocardial ischemia.     Lexiscan nuclear stress test is abnormal.  There is previous transmural myocardial infarction in mid to apical segments and anteroseptal segment. There is no reversible change indicating inducible myocardial ischemia.     Normal left ventricular size with akinesis in mid to apical anterior wall and distal anteroseptal segment.     The patient is at an intermediate risk of future cardiac ischemic events.  Left ventricular ejection fraction 40%      Echocardiogram 1/17/2020    Left ventricle ejection fraction is moderately decreased. The calculated left ventricular ejection fraction is 32%. There is global hypokinesis with akinesis of the mid to distal anteroseptal wall and apex    Normal left ventricular size.    Mild mitral and tricuspid regurgitation. No evidence of pulmonary hypertension    When compared to the previous study dated 11/22/2017, overall left ventricular function has declined.       Physical Examination  Review of Systems   Vitals: /68 (BP Location: Left arm, Patient Position: Sitting, Cuff Size: Adult Large)   Pulse 69   Resp 14   Wt 97.5 kg (215 lb)   BMI 27.60 kg/m    BMI= Body mass index is 27.6 kg/m .  Wt Readings from Last 3 Encounters:   11/03/21 97.5 kg (215 lb)   05/18/21 97.5 kg (215 lb)   03/05/21 97.5 kg (215 lb)       General Appearance:   no distress, normal body habitus   ENT/Mouth: membranes moist, no oral lesions or bleeding gums.      EYES:  no scleral icterus, normal conjunctivae   Neck: no carotid bruits or thyromegaly   Chest/Lungs:   lungs are clear to auscultation, no rales or wheezing   Cardiovascular:   Regular. Normal first and second heart sounds with no murmurs  Jugular venous pressure normal, trace edema bilaterally    Abdomen:  no organomegaly, masses, bruits, or tenderness; bowel sounds are present    Extremities: no cyanosis or clubbing   Skin: no xanthelasma, warm.    Neurologic: normal  bilateral, no tremors     Psychiatric: alert and oriented x3, calm        Please refer above for cardiac ROS details.        Medical History  Surgical History Family History Social History   Past Medical History:   Diagnosis Date     Arthritis      BPH (benign prostatic hyperplasia)      Coronary artery disease      Diverticulosis      Hyperlipidemia      Hypertension      Ischemic cardiomyopathy      Myocardial infarction (H) 2004     Prostate hyperplasia, benign localized, without urinary obstruction      Squamous cell carcinoma of skin      Past Surgical History:   Procedure Laterality Date     CORONARY STENT PLACEMENT       CYSTOSCOPY PROSTATE W/ LASER N/A 11/29/2016    Procedure: CYSTOSCOPY WITH TRANSURETHRAL RESECTION OF THE PROSTATE WITH QUANTA LASER;  Surgeon: Tre Cuadra MD;  Location: [facility];  Service:      Family History   Problem Relation Age of Onset     Heart Disease Mother         coronary stents     Benign prostatic hyperplasia Father      Heart Disease Father      Kidney Cancer Father      Benign prostatic hyperplasia Paternal Uncle      Benign prostatic hyperplasia Paternal Grandfather         Social History     Socioeconomic History     Marital status:      Spouse name: Not on file     Number of children: Not on file     Years of education: Not on file     Highest education level: Not on file   Occupational History     Not on file   Tobacco Use     Smoking status: Never Smoker     Smokeless tobacco: Never Used   Substance and Sexual Activity     Alcohol use: Yes     Comment: Alcoholic Drinks/day: occasionally     Drug use: No     Sexual activity: Not on file   Other Topics Concern     Not on file   Social History Narrative     Not on file     Social Determinants of Health     Financial Resource Strain:      Difficulty of Paying Living Expenses:    Food Insecurity:       Worried About Running Out of Food in the Last Year:      Ran Out of Food in the Last Year:    Transportation Needs:      Lack of Transportation (Medical):      Lack of Transportation (Non-Medical):    Physical Activity:      Days of Exercise per Week:      Minutes of Exercise per Session:    Stress:      Feeling of Stress :    Social Connections:      Frequency of Communication with Friends and Family:      Frequency of Social Gatherings with Friends and Family:      Attends Alevism Services:      Active Member of Clubs or Organizations:      Attends Club or Organization Meetings:      Marital Status:    Intimate Partner Violence:      Fear of Current or Ex-Partner:      Emotionally Abused:      Physically Abused:      Sexually Abused:            Medications  Allergies   Current Outpatient Medications   Medication Sig Dispense Refill     aspirin 81 MG EC tablet [ASPIRIN 81 MG EC TABLET] Take 1 tablet (81 mg total) by mouth daily.  0     atorvastatin (LIPITOR) 40 MG tablet [ATORVASTATIN (LIPITOR) 40 MG TABLET] TAKE ONE TABLET BY MOUTH ONCE DAILY 90 tablet 1     carvedilol (COREG) 12.5 MG tablet TAKE ONE TABLET BY MOUTH TWICE A DAY WITH MEALS 180 tablet 1     lisinopriL (PRINIVIL,ZESTRIL) 10 MG tablet [LISINOPRIL (PRINIVIL,ZESTRIL) 10 MG TABLET] Take 1 tablet (10 mg total) by mouth daily. 90 tablet 3     loratadine (CLARITIN) 10 mg tablet [LORATADINE (CLARITIN) 10 MG TABLET] Take 10 mg by mouth daily as needed.        No Known Allergies       Lab Results    Chemistry/lipid CBC Cardiac Enzymes/BNP/TSH/INR   Recent Labs   Lab Test 12/14/20  1558   CHOL 96   HDL 33*   LDL 49   TRIG 70     Recent Labs   Lab Test 12/14/20  1558 03/22/19  1140 11/09/17  1408   LDL 49 52 56     Recent Labs   Lab Test 12/14/20  1558      POTASSIUM 4.6   CHLORIDE 104   CO2 26   GLC 95   BUN 17   CR 0.87   GFRESTIMATED >60   RENE 9.0     Recent Labs   Lab Test 12/14/20  1558 03/22/19  1140   CR 0.87 0.94     No results for input(s): A1C  in the last 97112 hours.       Recent Labs   Lab Test 12/14/20  1558   WBC 5.7   HGB 16.5   HCT 49.1   MCV 88        Recent Labs   Lab Test 12/14/20  1558 03/22/19  1140   HGB 16.5 16.2    No results for input(s): TROPONINI in the last 00961 hours.  No results for input(s): BNP, NTBNPI, NTBNP in the last 33519 hours.  No results for input(s): TSH in the last 21224 hours.  No results for input(s): INR in the last 29073 hours.       Marline Ohara MD  [facility]

## 2021-11-03 NOTE — PROGRESS NOTES
HEART CARE ENCOUNTER CONSULTATON NOTE      [facility]  [phone]      Assessment/Recommendations   Assessment:    1.  Coronary artery disease: Status post anterior myocardial infarction in 2004.   No anginal complaints. Continue on daily aspirin.  2.  Hypertension: Well-controlled today  3.  Dyslipidemia: Recent lipids reviewed and well controlled  4.  Ischemic cardiomyopathy: Left ventricular ejection fraction 40% on stress testing.  Echocardiogram demonstrated left ventricular ejection fraction reduced to 32%.     Plan:  1.  Will reevaluate left ventricular function with cardiac MRI   2.  Continue aspirin, high-dose statin therapy, carvedilol and lisinopril  3.    Follow-up on results of cardiac MRI.  No cardiac contraindications to proceeding with knee surgery as planned.  No active anginal complaints or evidence of decompensated heart failure.  Reports good exercise tolerance without concerning symptoms.      Follow-up in 1 year     History of Present Illness/Subjective    HPI: Harrison Barrientos is a 71 year old male with history of ischemic cardiomyopathy with left ventricular ejection fraction of 40% status post anterior MI in 2004 here for follow-up.     Last year his echocardiogram suggested a decline in his left ventricular ejection fraction.  Lexiscan nuclear stress test 2/5/2020 demonstrated area of infarction without ischemia left ventricular ejection fraction 40%.    Is scheduled for right total knee replacement surgery in December.  He has remained active.  He is able to walk up and down stairs without problems with breathing or chest pain.  No increased edema either.    Lexiscan nuclear stress test 2/5/20     A prior study was conducted on 10/25/2013.  This study has no change when compared with the prior study.     Lexiscan stress ECG is negative for inducible myocardial ischemia.     Lexiscan nuclear stress test is abnormal.  There is previous transmural myocardial infarction  in mid to apical segments and anteroseptal segment. There is no reversible change indicating inducible myocardial ischemia.     Normal left ventricular size with akinesis in mid to apical anterior wall and distal anteroseptal segment.     The patient is at an intermediate risk of future cardiac ischemic events.  Left ventricular ejection fraction 40%      Echocardiogram 1/17/2020    Left ventricle ejection fraction is moderately decreased. The calculated left ventricular ejection fraction is 32%. There is global hypokinesis with akinesis of the mid to distal anteroseptal wall and apex    Normal left ventricular size.    Mild mitral and tricuspid regurgitation. No evidence of pulmonary hypertension    When compared to the previous study dated 11/22/2017, overall left ventricular function has declined.       Physical Examination  Review of Systems   Vitals: /68 (BP Location: Left arm, Patient Position: Sitting, Cuff Size: Adult Large)   Pulse 69   Resp 14   Wt 97.5 kg (215 lb)   BMI 27.60 kg/m    BMI= Body mass index is 27.6 kg/m .  Wt Readings from Last 3 Encounters:   11/03/21 97.5 kg (215 lb)   05/18/21 97.5 kg (215 lb)   03/05/21 97.5 kg (215 lb)       General Appearance:   no distress, normal body habitus   ENT/Mouth: membranes moist, no oral lesions or bleeding gums.      EYES:  no scleral icterus, normal conjunctivae   Neck: no carotid bruits or thyromegaly   Chest/Lungs:   lungs are clear to auscultation, no rales or wheezing   Cardiovascular:   Regular. Normal first and second heart sounds with no murmurs  Jugular venous pressure normal, trace edema bilaterally    Abdomen:  no organomegaly, masses, bruits, or tenderness; bowel sounds are present   Extremities: no cyanosis or clubbing   Skin: no xanthelasma, warm.    Neurologic: normal  bilateral, no tremors     Psychiatric: alert and oriented x3, calm        Please refer above for cardiac ROS details.        Medical History  Surgical History  Family History Social History   Past Medical History:   Diagnosis Date     Arthritis      BPH (benign prostatic hyperplasia)      Coronary artery disease      Diverticulosis      Hyperlipidemia      Hypertension      Ischemic cardiomyopathy      Myocardial infarction (H) 2004     Prostate hyperplasia, benign localized, without urinary obstruction      Squamous cell carcinoma of skin      Past Surgical History:   Procedure Laterality Date     CORONARY STENT PLACEMENT       CYSTOSCOPY PROSTATE W/ LASER N/A 11/29/2016    Procedure: CYSTOSCOPY WITH TRANSURETHRAL RESECTION OF THE PROSTATE WITH QUANTA LASER;  Surgeon: Tre Cuadra MD;  Location: [facility];  Service:      Family History   Problem Relation Age of Onset     Heart Disease Mother         coronary stents     Benign prostatic hyperplasia Father      Heart Disease Father      Kidney Cancer Father      Benign prostatic hyperplasia Paternal Uncle      Benign prostatic hyperplasia Paternal Grandfather         Social History     Socioeconomic History     Marital status:      Spouse name: Not on file     Number of children: Not on file     Years of education: Not on file     Highest education level: Not on file   Occupational History     Not on file   Tobacco Use     Smoking status: Never Smoker     Smokeless tobacco: Never Used   Substance and Sexual Activity     Alcohol use: Yes     Comment: Alcoholic Drinks/day: occasionally     Drug use: No     Sexual activity: Not on file   Other Topics Concern     Not on file   Social History Narrative     Not on file     Social Determinants of Health     Financial Resource Strain:      Difficulty of Paying Living Expenses:    Food Insecurity:      Worried About Running Out of Food in the Last Year:      Ran Out of Food in the Last Year:    Transportation Needs:      Lack of Transportation (Medical):      Lack of Transportation (Non-Medical):    Physical Activity:      Days of Exercise per Week:       Minutes of Exercise per Session:    Stress:      Feeling of Stress :    Social Connections:      Frequency of Communication with Friends and Family:      Frequency of Social Gatherings with Friends and Family:      Attends Rastafari Services:      Active Member of Clubs or Organizations:      Attends Club or Organization Meetings:      Marital Status:    Intimate Partner Violence:      Fear of Current or Ex-Partner:      Emotionally Abused:      Physically Abused:      Sexually Abused:            Medications  Allergies   Current Outpatient Medications   Medication Sig Dispense Refill     aspirin 81 MG EC tablet [ASPIRIN 81 MG EC TABLET] Take 1 tablet (81 mg total) by mouth daily.  0     atorvastatin (LIPITOR) 40 MG tablet [ATORVASTATIN (LIPITOR) 40 MG TABLET] TAKE ONE TABLET BY MOUTH ONCE DAILY 90 tablet 1     carvedilol (COREG) 12.5 MG tablet TAKE ONE TABLET BY MOUTH TWICE A DAY WITH MEALS 180 tablet 1     lisinopriL (PRINIVIL,ZESTRIL) 10 MG tablet [LISINOPRIL (PRINIVIL,ZESTRIL) 10 MG TABLET] Take 1 tablet (10 mg total) by mouth daily. 90 tablet 3     loratadine (CLARITIN) 10 mg tablet [LORATADINE (CLARITIN) 10 MG TABLET] Take 10 mg by mouth daily as needed.        No Known Allergies       Lab Results    Chemistry/lipid CBC Cardiac Enzymes/BNP/TSH/INR   Recent Labs   Lab Test 12/14/20  1558   CHOL 96   HDL 33*   LDL 49   TRIG 70     Recent Labs   Lab Test 12/14/20  1558 03/22/19  1140 11/09/17  1408   LDL 49 52 56     Recent Labs   Lab Test 12/14/20  1558      POTASSIUM 4.6   CHLORIDE 104   CO2 26   GLC 95   BUN 17   CR 0.87   GFRESTIMATED >60   RENE 9.0     Recent Labs   Lab Test 12/14/20  1558 03/22/19  1140   CR 0.87 0.94     No results for input(s): A1C in the last 36397 hours.       Recent Labs   Lab Test 12/14/20  1558   WBC 5.7   HGB 16.5   HCT 49.1   MCV 88        Recent Labs   Lab Test 12/14/20  1558 03/22/19  1140   HGB 16.5 16.2    No results for input(s): TROPONINI in the last 28477  hours.  No results for input(s): BNP, NTBNPI, NTBNP in the last 12854 hours.  No results for input(s): TSH in the last 01854 hours.  No results for input(s): INR in the last 89494 hours.     Marline Ohara MD

## 2021-12-01 DIAGNOSIS — I10 BENIGN ESSENTIAL HYPERTENSION: ICD-10-CM

## 2021-12-02 ENCOUNTER — HOSPITAL ENCOUNTER (OUTPATIENT)
Dept: MRI IMAGING | Facility: HOSPITAL | Age: 71
End: 2021-12-02
Attending: INTERNAL MEDICINE
Payer: COMMERCIAL

## 2021-12-02 DIAGNOSIS — I25.5 ISCHEMIC CARDIOMYOPATHY: ICD-10-CM

## 2021-12-02 PROCEDURE — 75561 CARDIAC MRI FOR MORPH W/DYE: CPT

## 2021-12-02 PROCEDURE — 255N000002 HC RX 255 OP 636: Performed by: INTERNAL MEDICINE

## 2021-12-02 PROCEDURE — A9585 GADOBUTROL INJECTION: HCPCS | Performed by: INTERNAL MEDICINE

## 2021-12-02 PROCEDURE — 999N000122 MR MYOCARDIUM  OVERREAD

## 2021-12-02 PROCEDURE — 75561 CARDIAC MRI FOR MORPH W/DYE: CPT | Mod: 26 | Performed by: INTERNAL MEDICINE

## 2021-12-02 RX ORDER — GADOBUTROL 604.72 MG/ML
16 INJECTION INTRAVENOUS ONCE
Status: COMPLETED | OUTPATIENT
Start: 2021-12-02 | End: 2021-12-02

## 2021-12-02 RX ORDER — LISINOPRIL 10 MG/1
TABLET ORAL
Qty: 90 TABLET | Refills: 0 | Status: SHIPPED | OUTPATIENT
Start: 2021-12-02 | End: 2022-03-01

## 2021-12-02 RX ADMIN — GADOBUTROL 16 ML: 604.72 INJECTION INTRAVENOUS at 13:44

## 2021-12-02 NOTE — TELEPHONE ENCOUNTER
"Last Written Prescription Date:  12/14/2020  Last Fill Quantity: 90,  # refills: 3   Last office visit provider:  7/5/21, acute visit     Requested Prescriptions   Pending Prescriptions Disp Refills     lisinopril (ZESTRIL) 10 MG tablet [Pharmacy Med Name: LISINOPRIL 10MG TABS] 90 tablet 3     Sig: TAKE 1 TABLET (10 MG TOTAL) BY MOUTH DAILY.       ACE Inhibitors (Including Combos) Protocol Passed - 12/1/2021  5:01 AM        Passed - Blood pressure under 140/90 in past 12 months     BP Readings from Last 3 Encounters:   11/03/21 130/68   05/18/21 (!) 153/81   03/05/21 122/66                 Passed - Recent (12 mo) or future (30 days) visit within the authorizing provider's specialty     Patient has had an office visit with the authorizing provider or a provider within the authorizing providers department within the previous 12 mos or has a future within next 30 days. See \"Patient Info\" tab in inbasket, or \"Choose Columns\" in Meds & Orders section of the refill encounter.              Passed - Medication is active on med list        Passed - Patient is age 18 or older        Passed - Normal serum creatinine on file in past 12 months     Recent Labs   Lab Test 12/14/20  1558   CR 0.87       Ok to refill medication if creatinine is low          Passed - Normal serum potassium on file in past 12 months     Recent Labs   Lab Test 12/14/20  1558   POTASSIUM 4.6                  Tamar Colorado RN 12/02/21 2:33 PM  "

## 2021-12-08 NOTE — PROVIDER NOTIFICATION
Discharge plan according to [facility]:       10/27/21 1537   Discharge Planning   Patient/Family Anticipates Transition to home   Living Arrangements   People in home spouse   Type of Residence Private Residence   Is your private residence a single family home or apartment? Single family home   Bathroom Shower/Tub Tub/Shower unit   Support System   Support Systems Spouse/Significant Other   Do you have someone available to stay with you one or two nights once you are home? Yes

## 2021-12-10 ENCOUNTER — OFFICE VISIT (OUTPATIENT)
Dept: FAMILY MEDICINE | Facility: CLINIC | Age: 71
End: 2021-12-10
Payer: COMMERCIAL

## 2021-12-10 VITALS
WEIGHT: 214.4 LBS | DIASTOLIC BLOOD PRESSURE: 76 MMHG | RESPIRATION RATE: 16 BRPM | HEIGHT: 74 IN | HEART RATE: 57 BPM | SYSTOLIC BLOOD PRESSURE: 143 MMHG | TEMPERATURE: 98.1 F | BODY MASS INDEX: 27.52 KG/M2

## 2021-12-10 DIAGNOSIS — I25.5 ISCHEMIC CARDIOMYOPATHY: ICD-10-CM

## 2021-12-10 DIAGNOSIS — M17.11 PRIMARY OSTEOARTHRITIS OF RIGHT KNEE: ICD-10-CM

## 2021-12-10 DIAGNOSIS — I25.10 CORONARY ARTERY DISEASE INVOLVING NATIVE CORONARY ARTERY OF NATIVE HEART WITHOUT ANGINA PECTORIS: ICD-10-CM

## 2021-12-10 DIAGNOSIS — Z01.818 PRE-OPERATIVE EXAMINATION: Primary | ICD-10-CM

## 2021-12-10 DIAGNOSIS — Z11.59 ENCOUNTER FOR SCREENING FOR OTHER VIRAL DISEASES: ICD-10-CM

## 2021-12-10 DIAGNOSIS — I51.3 MURAL THROMBUS OF HEART: ICD-10-CM

## 2021-12-10 LAB
ANION GAP SERPL CALCULATED.3IONS-SCNC: 9 MMOL/L (ref 5–18)
ATRIAL RATE - MUSE: 64 BPM
BUN SERPL-MCNC: 16 MG/DL (ref 8–22)
CALCIUM SERPL-MCNC: 8.9 MG/DL (ref 8.5–10.5)
CHLORIDE BLD-SCNC: 105 MMOL/L (ref 98–107)
CO2 SERPL-SCNC: 24 MMOL/L (ref 22–31)
CREAT SERPL-MCNC: 0.78 MG/DL (ref 0.7–1.3)
DIASTOLIC BLOOD PRESSURE - MUSE: NORMAL MMHG
ERYTHROCYTE [DISTWIDTH] IN BLOOD BY AUTOMATED COUNT: 12.4 % (ref 10–15)
GFR SERPL CREATININE-BSD FRML MDRD: >90 ML/MIN/1.73M2
GLUCOSE BLD-MCNC: 103 MG/DL (ref 70–125)
HCT VFR BLD AUTO: 49.3 % (ref 40–53)
HGB BLD-MCNC: 16.1 G/DL (ref 13.3–17.7)
INR PPP: 1.06 (ref 0.85–1.15)
INTERPRETATION ECG - MUSE: NORMAL
MCH RBC QN AUTO: 28.6 PG (ref 26.5–33)
MCHC RBC AUTO-ENTMCNC: 32.7 G/DL (ref 31.5–36.5)
MCV RBC AUTO: 88 FL (ref 78–100)
P AXIS - MUSE: 33 DEGREES
PLATELET # BLD AUTO: 208 10E3/UL (ref 150–450)
POTASSIUM BLD-SCNC: 4.5 MMOL/L (ref 3.5–5)
PR INTERVAL - MUSE: 182 MS
QRS DURATION - MUSE: 128 MS
QT - MUSE: 440 MS
QTC - MUSE: 453 MS
R AXIS - MUSE: -61 DEGREES
RBC # BLD AUTO: 5.62 10E6/UL (ref 4.4–5.9)
SODIUM SERPL-SCNC: 138 MMOL/L (ref 136–145)
SYSTOLIC BLOOD PRESSURE - MUSE: NORMAL MMHG
T AXIS - MUSE: 64 DEGREES
VENTRICULAR RATE- MUSE: 64 BPM
WBC # BLD AUTO: 5.5 10E3/UL (ref 4–11)

## 2021-12-10 PROCEDURE — U0003 INFECTIOUS AGENT DETECTION BY NUCLEIC ACID (DNA OR RNA); SEVERE ACUTE RESPIRATORY SYNDROME CORONAVIRUS 2 (SARS-COV-2) (CORONAVIRUS DISEASE [COVID-19]), AMPLIFIED PROBE TECHNIQUE, MAKING USE OF HIGH THROUGHPUT TECHNOLOGIES AS DESCRIBED BY CMS-2020-01-R: HCPCS | Performed by: FAMILY MEDICINE

## 2021-12-10 PROCEDURE — 93010 ELECTROCARDIOGRAM REPORT: CPT | Performed by: GENERAL ACUTE CARE HOSPITAL

## 2021-12-10 PROCEDURE — 85027 COMPLETE CBC AUTOMATED: CPT | Performed by: FAMILY MEDICINE

## 2021-12-10 PROCEDURE — 99215 OFFICE O/P EST HI 40 MIN: CPT | Performed by: FAMILY MEDICINE

## 2021-12-10 PROCEDURE — 93005 ELECTROCARDIOGRAM TRACING: CPT | Performed by: FAMILY MEDICINE

## 2021-12-10 PROCEDURE — 80048 BASIC METABOLIC PNL TOTAL CA: CPT | Performed by: FAMILY MEDICINE

## 2021-12-10 PROCEDURE — U0005 INFEC AGEN DETEC AMPLI PROBE: HCPCS | Performed by: FAMILY MEDICINE

## 2021-12-10 PROCEDURE — 36415 COLL VENOUS BLD VENIPUNCTURE: CPT | Performed by: FAMILY MEDICINE

## 2021-12-10 PROCEDURE — 85610 PROTHROMBIN TIME: CPT | Performed by: FAMILY MEDICINE

## 2021-12-10 ASSESSMENT — MIFFLIN-ST. JEOR: SCORE: 1807.26

## 2021-12-10 NOTE — PROGRESS NOTES
[facility] [place]  [address] [facility] [address]  Phone: [phone]  Fax: [phone]  Primary Provider: Declan Roldan  Pre-op Performing Provider: DECLAN ROLDAN      PREOPERATIVE EVALUATION:  Today's date: 12/10/2021    Harrison Barrientos is a 71 year old male who presents for a preoperative evaluation.    Surgical Information:  Surgery/Procedure: Right total Knee Arthroplasty   Surgery Location: [facility]   Surgeon: Dr. Estrada   Surgery Date: 12/13/2021  Time of Surgery: 9AM  Where patient plans to recover: At home with family  Fax number for surgical facility: Note does not need to be faxed, will be available electronically in Epic.    Type of Anesthesia Anticipated: General    Assessment & Plan     The proposed surgical procedure is considered INTERMEDIATE risk.    Pre-operative examination  Primary osteoarthritis of right knee    Patient is approved for surgery  He has had an evaluation by Dr. Ohara given his history as noted below  EKG reveals sinus rhythm with premature ventricular complexes and nonspecific intra-ventricular conduction delay  Covid test is pending  Check a basic metabolic panel, hemogram platelets, and INR  Please see cardiology recommendations below regarding starting Eliquis following surgery    - EKG 12-lead, tracing only  - Basic metabolic panel; Future  - CBC with platelets; Future  - INR; Future  - Basic metabolic panel  - CBC with platelets  - INR      Coronary artery disease involving native coronary artery of native heart without angina pectoris  Ischemic cardiomyopathy  Mural thrombus of heart    Patient had an evaluation by Dr. Ohara, cardiology, in November with approval to proceed with surgery  A cardiac MRI showed an estimated ejection fraction of 40%  There was evidence of a small mural thrombus involving the distal anteroseptal and apical septal wall segments  Dr. Ohara recommends starting Eliquis following surgery when given  the approval by surgery  There is no recommendation for bridging per Dr. Ohara prior to surgery      Cardiac MRI:    SUMMARY   ==========================================================================================================     1.  Normal left ventricular size with moderate reduction in left ventricular systolic function. The  quantified left ventricular ejection fraction is 40.9%. There is akinesis of the entire apex, distal  anteroseptum and distal anterior wall segement.  Severe hypokinesis of the anteroseptal wall and mid  anterior wall segments.  2.  Non-transmural myocardial infarction involving the basal to mid anteroseptal and mid anterior wall  involving 50% of myocardium representing possibly viable myocardium.  Transmural myocardial infarction  involving distal anteroseptal, apical anterior wall and entire apex of the left ventricle representing  nonviable myocardium.  T1 inferoseptal wall: 1044 ms (normal).   T1 anteroseptal wall:1289 ms (abnormal).   3. Small area mural thrombus involving the distal anteroseptal and apical septal wall segments.  4.  Normal right ventricular size and systolic function. RVEF: 61.5%   5.  There is mild aortic regurgitation.   6.  Mild ascending aortic enlargement at 41 mm.  Mild aortic root enlargement at 41.5 mm      - apixaban ANTICOAGULANT (ELIQUIS ANTICOAGULANT) 5 MG tablet; Take 1 tablet (5 mg) by mouth 2 times daily    Encounter for screening for other viral diseases    Check a Covid test  - Asymptomatic COVID-19 Virus (Coronavirus) by PCR Nose         Risks and Recommendations:  The patient has the following additional risks and recommendations for perioperative complications:  See assessment and plan:    Coronary artery disease  Ischemic cardiomyopathy  Mural thrombus    Medication Instructions:  Patient is to take all scheduled medications on the day of surgery EXCEPT for modifications listed below:    RECOMMENDATION:  APPROVAL GIVEN to proceed with  proposed procedure, without further diagnostic evaluation.    Review of external notes as documented above      Spent 60 minutes including time for chart preparation and review and reviewing anticoagulation recommendations.    Subjective     HPI related to upcoming procedure:     This is a 71-year-old male who presents to clinic for preoperative evaluation.  He has a known history of chronic right knee pain.  X-rays confirmed osteoarthritis.  He has not responded to conservative measures and therefore is a candidate for total knee arthroplasty.    Medical history is notable for coronary artery disease, ischemic cardiomyopathy, benign prostatic hypertrophy with obstructive symptoms, and an elevated PSA as well as hyperlipidemia.      Regarding his cardiovascular history he had a previous myocardial infarction in 2004.  He had a single-vessel coronary artery disease with coronary stent placement in the proximal LAD at the time.  He has followed up with cardiology on a consistent basis for an ischemic cardiomyopathy.    A previous echocardiogram revealed an ejection fraction of 32% with mild mitral regurgitation and mild tricuspid regurgitation.    A follow-up nuclear cardiac stress test was negative for inducible myocardial ischemia in 2020.  There was evidence of a previous transmural myocardial infarction in the mid to apical segments and anteroseptal segment.  There was akinesis in the mid to apical anterior wall and distal anteroseptal segment.      He continues to follow-up with Dr. Ohara, cardiology.  She performed a recent evaluation and approved him for the upcoming surgery.  In the meantime, he did have an abnormal cardiac MRI showing a moderate reduction in the left jugular ejection fraction.  The ejection fraction was estimated to be 40.9%.  There was an area of nontransmural myocardial infarction.  Also, there was a small area of mural thrombus involving the distal anteroseptal and apical septal wall  segments.  Dr. Ohara reviewed this and recommended that patient start Eliquis following surgery.    He has been consistent in taking his medications.  He has been feeling well and denies any recent chest pain, shortness of breath, palpitations, or other concerns.  He has tolerated anesthesia previously.  He is appropriate candidate for the planned procedure.         Preop Questions 12/5/2021   1. Have you ever had a heart attack or stroke? YES - MI in 2004   2. Have you ever had surgery on your heart or blood vessels, such as a stent placement, a coronary artery bypass, or surgery on an artery in your head, neck, heart, or legs? YES - Coronary stent placement   3. Do you have chest pain with activity? No   4. Do you have a history of  heart failure? No   5. Do you currently have a cold, bronchitis or symptoms of other infection? No   6. Do you have a cough, shortness of breath, or wheezing? No   7. Do you or anyone in your family have previous history of blood clots? No   8. Do you or does anyone in your family have a serious bleeding problem such as prolonged bleeding following surgeries or cuts? No   9. Have you ever had problems with anemia or been told to take iron pills? No   10. Have you had any abnormal blood loss such as black, tarry or bloody stools? No   11. Have you ever had a blood transfusion? No   12. Are you willing to have a blood transfusion if it is medically needed before, during, or after your surgery? Yes   13. Have you or any of your relatives ever had problems with anesthesia? No   14. Do you have sleep apnea, excessive snoring or daytime drowsiness? No   15. Do you have any artifical heart valves or other implanted medical devices like a pacemaker, defibrillator, or continuous glucose monitor? No   16. Do you have artificial joints? No   17. Are you allergic to latex? No       Health Care Directive:  Patient does not have a Health Care Directive or Living Will: Discussed advance care planning  with patient; information given to patient to review.    Preoperative Review of :   reviewed - no record of controlled substances prescribed.      Status of Chronic Conditions:  See problem list for active medical problems.  Problems all longstanding and stable, except as noted/documented.  See ROS for pertinent symptoms related to these conditions.      Review of Systems  Constitutional, neuro, ENT, endocrine, pulmonary, cardiac, gastrointestinal, genitourinary, musculoskeletal, integument and psychiatric systems are negative, except as otherwise noted.    Patient Active Problem List    Diagnosis Date Noted     Adenomatous polyp of sigmoid colon 11/09/2019     Priority: Medium     Ischemic cardiomyopathy      Priority: Medium     Created by Conversion         Hyperlipidemia      Priority: Medium     Created by Conversion         BPH (benign prostatic hyperplasia)      Priority: Medium     Created by Conversion         Diverticulosis      Priority: Medium     Created by Conversion  Replacement Utility updated for latest IMO load         Coronary Artery Disease      Priority: Medium     Created by Conversion  Replacement Utility updated for latest IMO load         Benign Prostatic Hypertrophy With Urinary Obstruction      Priority: Medium     Created by Conversion         Squamous Cell Carcinoma Of The Skin      Priority: Medium     Created by Conversion         Joint Pain, Localized In The Shoulder      Priority: Medium     Created by Conversion         Fatigue      Priority: Medium     Created by Conversion         Neck Pain      Priority: Medium     Created by Conversion         Serology Prostate-specific Antigen (PSA) Elevated      Priority: Medium     Created by Conversion          Past Medical History:   Diagnosis Date     Antiplatelet or antithrombotic long-term use      Arthritis      BPH (benign prostatic hyperplasia)      Coronary artery disease      Diverticulosis      Hyperlipidemia       "Hypertension      Ischemic cardiomyopathy      Myocardial infarction (H) 2004     Prostate hyperplasia, benign localized, without urinary obstruction      Squamous cell carcinoma of skin      Stented coronary artery      Past Surgical History:   Procedure Laterality Date     CORONARY STENT PLACEMENT       CYSTOSCOPY PROSTATE W/ LASER N/A 11/29/2016    Procedure: CYSTOSCOPY WITH TRANSURETHRAL RESECTION OF THE PROSTATE WITH QUANTA LASER;  Surgeon: Tre Cuadra MD;  Location: [facility] [place];  Service:      Current Outpatient Medications   Medication Sig Dispense Refill     aspirin 81 MG EC tablet [ASPIRIN 81 MG EC TABLET] Take 1 tablet (81 mg total) by mouth daily.  0     atorvastatin (LIPITOR) 40 MG tablet TAKE ONE TABLET BY MOUTH ONCE DAILY 90 tablet 3     carvedilol (COREG) 12.5 MG tablet TAKE ONE TABLET BY MOUTH TWICE A DAY WITH MEALS 180 tablet 1     lisinopril (ZESTRIL) 10 MG tablet TAKE 1 TABLET (10 MG TOTAL) BY MOUTH DAILY. 90 tablet 0     loratadine (CLARITIN) 10 mg tablet [LORATADINE (CLARITIN) 10 MG TABLET] Take 10 mg by mouth daily as needed.          No Known Allergies     Social History     Tobacco Use     Smoking status: Never Smoker     Smokeless tobacco: Never Used   Substance Use Topics     Alcohol use: Yes     Comment: Alcoholic Drinks/day: occasionally     Family History   Problem Relation Age of Onset     Heart Disease Mother         coronary stents     Benign prostatic hyperplasia Father      Heart Disease Father      Kidney Cancer Father      Benign prostatic hyperplasia Paternal Uncle      Benign prostatic hyperplasia Paternal Grandfather      History   Drug Use No         Objective     BP (!) 143/76 (BP Location: Left arm, Patient Position: Sitting, Cuff Size: Adult Large)   Pulse 57   Temp 98.1  F (36.7  C) (Oral)   Resp 16   Ht 1.88 m (6' 2\")   Wt 97.3 kg (214 lb 6.4 oz)   BMI 27.53 kg/m      Physical Exam    GENERAL APPEARANCE: healthy, alert and no distress     EYES: " EOMI,  PERRL     HENT: ear canals and TM's normal and nose and mouth without ulcers or lesions     NECK: no adenopathy, no asymmetry, masses, or scars and thyroid normal to palpation     RESP: lungs clear to auscultation - no rales, rhonchi or wheezes     CV: regular rates and rhythm, normal S1 S2, no S3 or S4 and no murmur, click or rub     ABDOMEN: soft, nontender     MS: extremities normal- no gross deformities noted, no evidence of inflammation in joints, FROM in all extremities.     SKIN: no suspicious lesions or rashes     NEURO: Normal strength and tone, sensory exam grossly normal, mentation intact and speech normal     PSYCH: mentation appears normal. and affect normal/bright     LYMPHATICS: No cervical adenopathy    Recent Labs   Lab Test 12/14/20  1558   HGB 16.5         POTASSIUM 4.6   CR 0.87        Diagnostics:  Recent Results (from the past 168 hour(s))   CBC with platelets    Collection Time: 12/10/21 11:24 AM   Result Value Ref Range    WBC Count 5.5 4.0 - 11.0 10e3/uL    RBC Count 5.62 4.40 - 5.90 10e6/uL    Hemoglobin 16.1 13.3 - 17.7 g/dL    Hematocrit 49.3 40.0 - 53.0 %    MCV 88 78 - 100 fL    MCH 28.6 26.5 - 33.0 pg    MCHC 32.7 31.5 - 36.5 g/dL    RDW 12.4 10.0 - 15.0 %    Platelet Count 208 150 - 450 10e3/uL   EKG 12-lead, tracing only    Collection Time: 12/10/21  4:35 PM   Result Value Ref Range    Systolic Blood Pressure  mmHg    Diastolic Blood Pressure  mmHg    Ventricular Rate 64 BPM    Atrial Rate 64 BPM    MT Interval 182 ms    QRS Duration 128 ms     ms    QTc 453 ms    P Axis 33 degrees    R AXIS -61 degrees    T Axis 64 degrees    Interpretation ECG       Sinus rhythm with occasional Premature ventricular complexes  Left axis deviation  Non-specific intra-ventricular conduction block  Abnormal ECG  When compared with ECG of 09-NOV-2017 13:35,  Premature ventricular complexes are now Present  Minimal criteria for Septal infarct are no longer Present  Nonspecific  T wave abnormality, improved in Lateral leads        EKG: See report     EKG 12-lead, tracing only  Order: [id number]   Status: Preliminary result     Visible to patient: No (not released)     Next appt: None     Dx: Pre-operative examination     0 Result Notes      Component Ref Range & Units 12/10/21  4:35 PM 11/9/17 12:00 AM    Systolic Blood Pressure mmHg       Diastolic Blood Pressure mmHg       Ventricular Rate BPM 64  51     Atrial Rate BPM 64  51     ND Interval ms 182  194     QRS Duration ms 128  120     QT ms 440  472     QTc ms 453  435     P Axis degrees 33  35     R AXIS degrees -61  -55     T Axis degrees 64  29     Interpretation ECG  Sinus rhythm with occasional Premature ventricular complexes   Left axis deviation   Non-specific intra-ventricular conduction block   Abnormal ECG   When compared with ECG of 09-NOV-2017 13:35,   Premature ventricular complexes are now Present   Minimal criteria for Septal infarct are no longer Present   Nonspecific T wave abnormality, improved in Lateral leads              Revised Cardiac Risk Index (RCRI):  The patient has the following serious cardiovascular risks for perioperative complications:   - Coronary Artery Disease (MI, positive stress test, angina, Qs on EKG) = 1 point     RCRI Interpretation: 1 point: Class II (low risk - 0.9% complication rate)           Signed Electronically by: Brad Roldan MD  Copy of this evaluation report is provided to requesting physician.

## 2021-12-10 NOTE — H&P (VIEW-ONLY)
[facility]  [address] [facility] [address]  Phone: [phone]  Fax: [phone]  Primary Provider: Declan Roldan  Pre-op Performing Provider: DECLAN ROLDAN      PREOPERATIVE EVALUATION:  Today's date: 12/10/2021    Harrison Barrientos is a 71 year old male who presents for a preoperative evaluation.    Surgical Information:  Surgery/Procedure: Right total Knee Arthroplasty   Surgery Location: [facility]   Surgeon: Dr. Estrada   Surgery Date: 12/13/2021  Time of Surgery: 9AM  Where patient plans to recover: At home with family  Fax number for surgical facility: Note does not need to be faxed, will be available electronically in Epic.    Type of Anesthesia Anticipated: General    Assessment & Plan     The proposed surgical procedure is considered INTERMEDIATE risk.    Pre-operative examination  Primary osteoarthritis of right knee    Patient is approved for surgery  He has had an evaluation by Dr. Ohara given his history as noted below  EKG reveals sinus rhythm with premature ventricular complexes and nonspecific intra-ventricular conduction delay  Covid test is pending  Check a basic metabolic panel, hemogram platelets, and INR  Please see cardiology recommendations below regarding starting Eliquis following surgery    - EKG 12-lead, tracing only  - Basic metabolic panel; Future  - CBC with platelets; Future  - INR; Future  - Basic metabolic panel  - CBC with platelets  - INR      Coronary artery disease involving native coronary artery of native heart without angina pectoris  Ischemic cardiomyopathy  Mural thrombus of heart    Patient had an evaluation by Dr. Ohara, cardiology, in November with approval to proceed with surgery  A cardiac MRI showed an estimated ejection fraction of 40%  There was evidence of a small mural thrombus involving the distal anteroseptal and apical septal wall segments  Dr. Ohara recommends starting Eliquis following surgery when given  the approval by surgery  There is no recommendation for bridging per Dr. Ohara prior to surgery      Cardiac MRI:    SUMMARY   ==========================================================================================================     1.  Normal left ventricular size with moderate reduction in left ventricular systolic function. The  quantified left ventricular ejection fraction is 40.9%. There is akinesis of the entire apex, distal  anteroseptum and distal anterior wall segement.  Severe hypokinesis of the anteroseptal wall and mid  anterior wall segments.  2.  Non-transmural myocardial infarction involving the basal to mid anteroseptal and mid anterior wall  involving 50% of myocardium representing possibly viable myocardium.  Transmural myocardial infarction  involving distal anteroseptal, apical anterior wall and entire apex of the left ventricle representing  nonviable myocardium.  T1 inferoseptal wall: 1044 ms (normal).   T1 anteroseptal wall:1289 ms (abnormal).   3. Small area mural thrombus involving the distal anteroseptal and apical septal wall segments.  4.  Normal right ventricular size and systolic function. RVEF: 61.5%   5.  There is mild aortic regurgitation.   6.  Mild ascending aortic enlargement at 41 mm.  Mild aortic root enlargement at 41.5 mm      - apixaban ANTICOAGULANT (ELIQUIS ANTICOAGULANT) 5 MG tablet; Take 1 tablet (5 mg) by mouth 2 times daily    Encounter for screening for other viral diseases    Check a Covid test  - Asymptomatic COVID-19 Virus (Coronavirus) by PCR Nose         Risks and Recommendations:  The patient has the following additional risks and recommendations for perioperative complications:  See assessment and plan:    Coronary artery disease  Ischemic cardiomyopathy  Mural thrombus    Medication Instructions:  Patient is to take all scheduled medications on the day of surgery EXCEPT for modifications listed below:    RECOMMENDATION:  APPROVAL GIVEN to proceed with  proposed procedure, without further diagnostic evaluation.    Review of external notes as documented above      Spent 60 minutes including time for chart preparation and review and reviewing anticoagulation recommendations.    Subjective     HPI related to upcoming procedure:     This is a 71-year-old male who presents to clinic for preoperative evaluation.  He has a known history of chronic right knee pain.  X-rays confirmed osteoarthritis.  He has not responded to conservative measures and therefore is a candidate for total knee arthroplasty.    Medical history is notable for coronary artery disease, ischemic cardiomyopathy, benign prostatic hypertrophy with obstructive symptoms, and an elevated PSA as well as hyperlipidemia.      Regarding his cardiovascular history he had a previous myocardial infarction in 2004.  He had a single-vessel coronary artery disease with coronary stent placement in the proximal LAD at the time.  He has followed up with cardiology on a consistent basis for an ischemic cardiomyopathy.    A previous echocardiogram revealed an ejection fraction of 32% with mild mitral regurgitation and mild tricuspid regurgitation.    A follow-up nuclear cardiac stress test was negative for inducible myocardial ischemia in 2020.  There was evidence of a previous transmural myocardial infarction in the mid to apical segments and anteroseptal segment.  There was akinesis in the mid to apical anterior wall and distal anteroseptal segment.      He continues to follow-up with Dr. Ohara, cardiology.  She performed a recent evaluation and approved him for the upcoming surgery.  In the meantime, he did have an abnormal cardiac MRI showing a moderate reduction in the left jugular ejection fraction.  The ejection fraction was estimated to be 40.9%.  There was an area of nontransmural myocardial infarction.  Also, there was a small area of mural thrombus involving the distal anteroseptal and apical septal wall  segments.  Dr. Ohara reviewed this and recommended that patient start Eliquis following surgery.    He has been consistent in taking his medications.  He has been feeling well and denies any recent chest pain, shortness of breath, palpitations, or other concerns.  He has tolerated anesthesia previously.  He is appropriate candidate for the planned procedure.         Preop Questions 12/5/2021   1. Have you ever had a heart attack or stroke? YES - MI in 2004   2. Have you ever had surgery on your heart or blood vessels, such as a stent placement, a coronary artery bypass, or surgery on an artery in your head, neck, heart, or legs? YES - Coronary stent placement   3. Do you have chest pain with activity? No   4. Do you have a history of  heart failure? No   5. Do you currently have a cold, bronchitis or symptoms of other infection? No   6. Do you have a cough, shortness of breath, or wheezing? No   7. Do you or anyone in your family have previous history of blood clots? No   8. Do you or does anyone in your family have a serious bleeding problem such as prolonged bleeding following surgeries or cuts? No   9. Have you ever had problems with anemia or been told to take iron pills? No   10. Have you had any abnormal blood loss such as black, tarry or bloody stools? No   11. Have you ever had a blood transfusion? No   12. Are you willing to have a blood transfusion if it is medically needed before, during, or after your surgery? Yes   13. Have you or any of your relatives ever had problems with anesthesia? No   14. Do you have sleep apnea, excessive snoring or daytime drowsiness? No   15. Do you have any artifical heart valves or other implanted medical devices like a pacemaker, defibrillator, or continuous glucose monitor? No   16. Do you have artificial joints? No   17. Are you allergic to latex? No       Health Care Directive:  Patient does not have a Health Care Directive or Living Will: Discussed advance care planning  with patient; information given to patient to review.    Preoperative Review of :   reviewed - no record of controlled substances prescribed.      Status of Chronic Conditions:  See problem list for active medical problems.  Problems all longstanding and stable, except as noted/documented.  See ROS for pertinent symptoms related to these conditions.      Review of Systems  Constitutional, neuro, ENT, endocrine, pulmonary, cardiac, gastrointestinal, genitourinary, musculoskeletal, integument and psychiatric systems are negative, except as otherwise noted.    Patient Active Problem List    Diagnosis Date Noted     Adenomatous polyp of sigmoid colon 11/09/2019     Priority: Medium     Ischemic cardiomyopathy      Priority: Medium     Created by Conversion         Hyperlipidemia      Priority: Medium     Created by Conversion         BPH (benign prostatic hyperplasia)      Priority: Medium     Created by Conversion         Diverticulosis      Priority: Medium     Created by Conversion  Replacement Utility updated for latest IMO load         Coronary Artery Disease      Priority: Medium     Created by Conversion  Replacement Utility updated for latest IMO load         Benign Prostatic Hypertrophy With Urinary Obstruction      Priority: Medium     Created by Conversion         Squamous Cell Carcinoma Of The Skin      Priority: Medium     Created by Conversion         Joint Pain, Localized In The Shoulder      Priority: Medium     Created by Conversion         Fatigue      Priority: Medium     Created by Conversion         Neck Pain      Priority: Medium     Created by Conversion         Serology Prostate-specific Antigen (PSA) Elevated      Priority: Medium     Created by Conversion          Past Medical History:   Diagnosis Date     Antiplatelet or antithrombotic long-term use      Arthritis      BPH (benign prostatic hyperplasia)      Coronary artery disease      Diverticulosis      Hyperlipidemia       "Hypertension      Ischemic cardiomyopathy      Myocardial infarction (H) 2004     Prostate hyperplasia, benign localized, without urinary obstruction      Squamous cell carcinoma of skin      Stented coronary artery      Past Surgical History:   Procedure Laterality Date     CORONARY STENT PLACEMENT       CYSTOSCOPY PROSTATE W/ LASER N/A 11/29/2016    Procedure: CYSTOSCOPY WITH TRANSURETHRAL RESECTION OF THE PROSTATE WITH QUANTA LASER;  Surgeon: Tre Cuadra MD;  Location: [facility];  Service:      Current Outpatient Medications   Medication Sig Dispense Refill     aspirin 81 MG EC tablet [ASPIRIN 81 MG EC TABLET] Take 1 tablet (81 mg total) by mouth daily.  0     atorvastatin (LIPITOR) 40 MG tablet TAKE ONE TABLET BY MOUTH ONCE DAILY 90 tablet 3     carvedilol (COREG) 12.5 MG tablet TAKE ONE TABLET BY MOUTH TWICE A DAY WITH MEALS 180 tablet 1     lisinopril (ZESTRIL) 10 MG tablet TAKE 1 TABLET (10 MG TOTAL) BY MOUTH DAILY. 90 tablet 0     loratadine (CLARITIN) 10 mg tablet [LORATADINE (CLARITIN) 10 MG TABLET] Take 10 mg by mouth daily as needed.          No Known Allergies     Social History     Tobacco Use     Smoking status: Never Smoker     Smokeless tobacco: Never Used   Substance Use Topics     Alcohol use: Yes     Comment: Alcoholic Drinks/day: occasionally     Family History   Problem Relation Age of Onset     Heart Disease Mother         coronary stents     Benign prostatic hyperplasia Father      Heart Disease Father      Kidney Cancer Father      Benign prostatic hyperplasia Paternal Uncle      Benign prostatic hyperplasia Paternal Grandfather      History   Drug Use No         Objective     BP (!) 143/76 (BP Location: Left arm, Patient Position: Sitting, Cuff Size: Adult Large)   Pulse 57   Temp 98.1  F (36.7  C) (Oral)   Resp 16   Ht 1.88 m (6' 2\")   Wt 97.3 kg (214 lb 6.4 oz)   BMI 27.53 kg/m      Physical Exam    GENERAL APPEARANCE: healthy, alert and no distress     EYES: " EOMI,  PERRL     HENT: ear canals and TM's normal and nose and mouth without ulcers or lesions     NECK: no adenopathy, no asymmetry, masses, or scars and thyroid normal to palpation     RESP: lungs clear to auscultation - no rales, rhonchi or wheezes     CV: regular rates and rhythm, normal S1 S2, no S3 or S4 and no murmur, click or rub     ABDOMEN: soft, nontender     MS: extremities normal- no gross deformities noted, no evidence of inflammation in joints, FROM in all extremities.     SKIN: no suspicious lesions or rashes     NEURO: Normal strength and tone, sensory exam grossly normal, mentation intact and speech normal     PSYCH: mentation appears normal. and affect normal/bright     LYMPHATICS: No cervical adenopathy    Recent Labs   Lab Test 12/14/20  1558   HGB 16.5         POTASSIUM 4.6   CR 0.87        Diagnostics:  Recent Results (from the past 168 hour(s))   CBC with platelets    Collection Time: 12/10/21 11:24 AM   Result Value Ref Range    WBC Count 5.5 4.0 - 11.0 10e3/uL    RBC Count 5.62 4.40 - 5.90 10e6/uL    Hemoglobin 16.1 13.3 - 17.7 g/dL    Hematocrit 49.3 40.0 - 53.0 %    MCV 88 78 - 100 fL    MCH 28.6 26.5 - 33.0 pg    MCHC 32.7 31.5 - 36.5 g/dL    RDW 12.4 10.0 - 15.0 %    Platelet Count 208 150 - 450 10e3/uL   EKG 12-lead, tracing only    Collection Time: 12/10/21  4:35 PM   Result Value Ref Range    Systolic Blood Pressure  mmHg    Diastolic Blood Pressure  mmHg    Ventricular Rate 64 BPM    Atrial Rate 64 BPM    CO Interval 182 ms    QRS Duration 128 ms     ms    QTc 453 ms    P Axis 33 degrees    R AXIS -61 degrees    T Axis 64 degrees    Interpretation ECG       Sinus rhythm with occasional Premature ventricular complexes  Left axis deviation  Non-specific intra-ventricular conduction block  Abnormal ECG  When compared with ECG of 09-NOV-2017 13:35,  Premature ventricular complexes are now Present  Minimal criteria for Septal infarct are no longer Present  Nonspecific  T wave abnormality, improved in Lateral leads        EKG: See report     EKG 12-lead, tracing only  Order: [id number]   Status: Preliminary result     Visible to patient: No (not released)     Next appt: None     Dx: Pre-operative examination     0 Result Notes      Component Ref Range & Units 12/10/21  4:35 PM 11/9/17 12:00 AM    Systolic Blood Pressure mmHg       Diastolic Blood Pressure mmHg       Ventricular Rate BPM 64  51     Atrial Rate BPM 64  51     HI Interval ms 182  194     QRS Duration ms 128  120     QT ms 440  472     QTc ms 453  435     P Axis degrees 33  35     R AXIS degrees -61  -55     T Axis degrees 64  29     Interpretation ECG  Sinus rhythm with occasional Premature ventricular complexes   Left axis deviation   Non-specific intra-ventricular conduction block   Abnormal ECG   When compared with ECG of 09-NOV-2017 13:35,   Premature ventricular complexes are now Present   Minimal criteria for Septal infarct are no longer Present   Nonspecific T wave abnormality, improved in Lateral leads              Revised Cardiac Risk Index (RCRI):  The patient has the following serious cardiovascular risks for perioperative complications:   - Coronary Artery Disease (MI, positive stress test, angina, Qs on EKG) = 1 point     RCRI Interpretation: 1 point: Class II (low risk - 0.9% complication rate)           Signed Electronically by: Brad Roldan MD  Copy of this evaluation report is provided to requesting physician.

## 2021-12-10 NOTE — PATIENT INSTRUCTIONS
I sent a prescription for Eliquis. You will start this AFTER your surgery (once the surgeon says it is OK)  You can follow-up with Dr. Ohara in the future to review    Preparing for Your Surgery  Getting started  A nurse will call you to review your health history and instructions. They will give you an arrival time based on your scheduled surgery time.  Please be ready to share the following:    Your doctor's clinic name and phone number    Your medical, surgical and anesthesia history    A list of allergies and sensitivities    A list of medicines, including herbal treatments and over-the-counter drugs    Whether the patient has a legal guardian (ask how to send us the papers in advance)  If you have a child who's having surgery, please ask for a copy of Preparing for Your Child's Surgery.    Preparing for surgery    Within 30 days of surgery: Have a pre-op exam (sometimes called an H&P, or History and Physical). This can be done at a clinic or pre-operative center.  ? If you're having a , you may not need this exam. Talk to your care team    At your pre-op exam, talk to your care team about all medicines you take. If you need to stop any medicines before surgery, ask when to start taking them again.  ? We do this for your safety. Many medicines can make you bleed too much during surgery. Some change how well surgery (anesthesia) drugs work.    Call your insurance company to let them know you're having surgery. (If you don't have insurance, call [phone].)    Call your clinic if there's any change in your health. This includes signs of a cold or flu (sore throat, runny nose, cough, rash, fever). It also includes a scrape or scratch near the surgery site.    If you have questions on the day of surgery, call your hospital or surgery center.  Eating and drinking guidelines  For your safety: Unless your surgeon tells you otherwise, follow the guidelines below.    Eat and drink as usual until 8 hours  before surgery. After that, no food or milk.    Drink clear liquids until 2 hours before surgery. These are liquids you can see through, like water, Gatorade and Propel Water. You may also have black coffee and tea (no cream or milk).    Nothing by mouth within 2 hours of surgery. This includes gum, candy and breath mints.    If you drink, stop drinking alcohol the night before surgery.    If your care team tells you to take medicine on the morning of surgery, it's okay to take it with a sip of water.  Preventing infection    Shower or bathe the night before and morning of your surgery. Follow the instructions your clinic gave you. (If no instructions, use regular soap.)    Don't shave or clip hair near your surgery site. We'll remove the hair if needed.    Don't smoke or vape the morning of surgery. You may chew nicotine gum up to 2 hours before surgery. A nicotine patch is okay.  ? Note: Some surgeries require you to completely quit smoking and nicotine. Check with your surgeon.    Your care team will make every effort to keep you safe from infection. We will:  ? Clean our hands often with soap and water (or an alcohol-based hand rub).  ? Clean the skin at your surgery site with a special soap that kills germs.  ? Give you a special gown to keep you warm. (Cold raises the risk of infection.)  ? Wear special hair covers, masks, gowns and gloves during surgery.  ? Give antibiotic medicine, if prescribed. Not all surgeries need antibiotics.  What to bring on the day of surgery    Photo ID and insurance card    Copy of your health care directive, if you have one    Glasses and hearing aides (bring cases)  ? You can't wear contacts during surgery    Inhaler and eye drops, if you use them (tell us about these when you arrive)    CPAP machine or breathing device, if you use them    A few personal items, if spending the night    If you have . . .  ? A pacemaker or ICD (cardiac defibrillator): Bring the ID card.  ? An  implanted stimulator: Bring the remote control.  ? A legal guardian: Bring a copy of the certified (court-stamped) guardianship papers.  Please remove any jewelry, including body piercings. Leave jewelry and other valuables at home.  If you're going home the day of surgery  Important: If you don't follow the rules below, we must cancel your surgery.     Arrange for someone to drive you home after surgery. You may not drive, take a taxi or take public transportation by yourself (unless you'll have local anesthesia only).    Arrange for a responsible adult to stay with you overnight. If you don't, we may keep you in the hospital overnight, and you may need to pay the costs yourself.  Questions?   If you have any questions for your care team, list them here: _________________________________________________________________________________________________________________________________________________________________________________________________________________________________________________________________________________________________________________________  For informational purposes only. Not to replace the advice of your health care provider. Copyright   2003, 2019 [facility]. All rights reserved. Clinically reviewed by Sofiya Ramos MD. [employer] [id number] - REV 4/20.

## 2021-12-11 LAB — SARS-COV-2 RNA RESP QL NAA+PROBE: NEGATIVE

## 2021-12-12 RX ORDER — ASPIRIN 81 MG/1
81 TABLET ORAL 2 TIMES DAILY
Qty: 60 TABLET | Refills: 0 | Status: CANCELLED | OUTPATIENT
Start: 2021-12-12

## 2021-12-12 RX ORDER — ASPIRIN 81 MG/1
81 TABLET ORAL 2 TIMES DAILY
Status: CANCELLED | OUTPATIENT
Start: 2021-12-12

## 2021-12-12 NOTE — TREATMENT PLAN
"Orthopedic Surgery Pre-Op Plan: Harrison Barrietnos  pre-op review. This is NOT an H&P   Surgeon: Dr. Estrada   Hospital: [facility]  Name of Surgery: Right Total Knee Arthroplasty   Date of Surgery: 12/13/21  H&P: Completed 12/10/21 by Dr. Brad Roldan at [facility].  History of ASA, NSAIDS, vitamin and/or herbal supplements within 10 days: Yes- on ASA 81 mg daily- ok to remain on this perioperatively if instructed to by Cardiology.   History of blood thinners: No- Not currently on any anticoagulation but Cardiologist, Dr. Ohara, recommends starting patient on Eliquis BID post-op due to recent finding of small mural thrombus in his left ventricle on cardiac MRI 12/2/21.     Plan:   1) Discharge Plan: Home with assist of Spouse. Please see Discharge Planning section near bottom of this note for further details.     2) Mural Thrombus in Left Ventricle: On Cardiac MRI 12/2/21: Small area mural thrombus involving the distal anteroseptal and apical septal wall segments. Patient is not currently on any anticoagulation but Cardiologist, Dr. Marline Ohara, [facility], advised starting on Eliquis BID post-op as soon as cleared to start it by Dr. Estrada. Per Dr. Ohara, \"  Reviewed MR results with MR reader and as it is a small mural thrombus in area of old infarct it is low risk and no need for bridging.   Advise eliquis 5 mg twice a day that can start post surgery once cleared by Dr Estrada.  Will send message to PCP and Dr Estrada. Can you let patient know? \"      I will remind Dr. Estrada and his team of recommendation to start anticoagulation with Eliquis BID following surgery.     3) Coronary Artery Disease: S/P Anterior Myocardial Infarction and Stent to LAD in 2004: Follows with Dr. Marline Ohara, [facility]. Reviewed recent visit note with Dr. Ohara from 11/3/21. Patient reports doing well post-stenting in 2004. Denies any chest pain, GIBBONS, palpitations, " dizziness or syncope. Reports good exercise tolerance. Dr. Ohara recommended reevaluation of left ventricular function with cardiac MRI prior to surgery but saw no cardiac contraindications to proceeding with knee surgery as planned. Cardiac MRI 12/2/21: showed improved LVEF of 40.9% but small mural thrombus in left ventricle. Patient cleared for surgery by Dr. Ohara but she recommends starting anticoagulation with Eliquis BID after surgery when cleared to do so by Dr. Estrada. Patient should follow up in Cardiology in 1 year per Dr. Ohara.    4) Ischemic Cardiomyopathy: LVEF improved to 40.9% on Cardiac MRI 12/2/21. Cleared by Cardiologist, Dr. Ohara, after review of MRI results.     5) Hyperlipidemia: on atorvastatin.    6) Hypertension: well-controlled on lisinopril and carvedilol. Instructed to hold lisinopril on the morning of surgery but should continue taking carvedilol.      7) Benign Prostatic Hyperplasia (BPH): No reports of obstructive urinary symptoms and not on tamsulosin. At increased risk for post-op urinary retention. Would recommend close monitoring for post-op urinary retention with frequent bladder scanning as needed.     Patient appears medically optimized for upcoming surgery. I would recommend Hospitalist Consult to assist with medical management. Please call me below with any questions on this patient.       Review of Systems Notable for:  Small mural thrombus in left ventricle on MRI 12/2/21-Cardiologist recommends starting on Eliquis post-op, coronary artery disease: S/p anterior myocardial infarction and stenting to LAD in 2004, ischemic cardiomyopathy, hyperlipidemia, hypertension, BPH.    Past Medical History:   Past Medical History:   Diagnosis Date     Antiplatelet or antithrombotic long-term use      Arthritis      BPH (benign prostatic hyperplasia)      Coronary artery disease      Diverticulosis      Hyperlipidemia      Hypertension      Ischemic cardiomyopathy      Myocardial  infarction (H) 2004     Prostate hyperplasia, benign localized, without urinary obstruction      Squamous cell carcinoma of skin      Stented coronary artery      Past Surgical History:   Procedure Laterality Date     CORONARY STENT PLACEMENT       CYSTOSCOPY PROSTATE W/ LASER N/A 11/29/2016    Procedure: CYSTOSCOPY WITH TRANSURETHRAL RESECTION OF THE PROSTATE WITH QUANTA LASER;  Surgeon: Tre Cuadra MD;  Location: [facility];  Service:        Current Medications:  Patient's Medications   New Prescriptions    No medications on file   Previous Medications    APIXABAN ANTICOAGULANT (ELIQUIS ANTICOAGULANT) 5 MG TABLET    Take 1 tablet (5 mg) by mouth 2 times daily    ASPIRIN 81 MG EC TABLET    [ASPIRIN 81 MG EC TABLET] Take 1 tablet (81 mg total) by mouth daily.    ATORVASTATIN (LIPITOR) 40 MG TABLET    TAKE ONE TABLET BY MOUTH ONCE DAILY    CARVEDILOL (COREG) 12.5 MG TABLET    TAKE ONE TABLET BY MOUTH TWICE A DAY WITH MEALS    LISINOPRIL (ZESTRIL) 10 MG TABLET    TAKE 1 TABLET (10 MG TOTAL) BY MOUTH DAILY.    LORATADINE (CLARITIN) 10 MG TABLET    [LORATADINE (CLARITIN) 10 MG TABLET] Take 10 mg by mouth daily as needed.    Modified Medications    No medications on file   Discontinued Medications    No medications on file       ALLERGIES:  No Known Allergies    Social History  Social History     Tobacco Use     Smoking status: Never Smoker     Smokeless tobacco: Never Used   Substance Use Topics     Alcohol use: Yes     Comment: Alcoholic Drinks/day: occasionally     Drug use: No       Any Abnormal Recent Diagnostics? No    Discharge Planning:   Discharge plan according to [facility]:       10/27/21 1537   Discharge Planning   Patient/Family Anticipates Transition to home   Living Arrangements   People in home spouse   Type of Residence Private Residence   Is your private residence a single family home or apartment? Single family home   Bathroom Shower/Tub Tub/Shower unit   Support System    Support Systems Spouse/Significant Other   Do you have someone available to stay with you one or two nights once you are home? Yes            NAVNEET Sena, CNP   Advanced Practice Nurse Navigator- Orthopedics  [facility]   Phone: [phone]

## 2021-12-13 ENCOUNTER — HOSPITAL ENCOUNTER (OUTPATIENT)
Facility: CLINIC | Age: 71
Discharge: HOME | End: 2021-12-14
Attending: ORTHOPAEDIC SURGERY | Admitting: ORTHOPAEDIC SURGERY
Payer: COMMERCIAL

## 2021-12-13 ENCOUNTER — APPOINTMENT (OUTPATIENT)
Dept: RADIOLOGY | Facility: CLINIC | Age: 71
End: 2021-12-13
Attending: PHYSICIAN ASSISTANT
Payer: COMMERCIAL

## 2021-12-13 ENCOUNTER — ANESTHESIA (OUTPATIENT)
Dept: SURGERY | Facility: CLINIC | Age: 71
End: 2021-12-13
Payer: COMMERCIAL

## 2021-12-13 ENCOUNTER — TELEPHONE (OUTPATIENT)
Dept: CARDIOLOGY | Facility: CLINIC | Age: 71
End: 2021-12-13
Payer: COMMERCIAL

## 2021-12-13 ENCOUNTER — ANESTHESIA EVENT (OUTPATIENT)
Dept: SURGERY | Facility: CLINIC | Age: 71
End: 2021-12-13
Payer: COMMERCIAL

## 2021-12-13 DIAGNOSIS — E78.00 HYPERCHOLESTEROLEMIA: Primary | ICD-10-CM

## 2021-12-13 DIAGNOSIS — M17.11 PRIMARY OSTEOARTHRITIS OF RIGHT KNEE: Primary | ICD-10-CM

## 2021-12-13 DIAGNOSIS — R33.9 URINARY RETENTION: ICD-10-CM

## 2021-12-13 DIAGNOSIS — I25.5 ISCHEMIC CARDIOMYOPATHY: ICD-10-CM

## 2021-12-13 DIAGNOSIS — I25.10 CORONARY ATHEROSCLEROSIS: ICD-10-CM

## 2021-12-13 PROCEDURE — 250N000013 HC RX MED GY IP 250 OP 250 PS 637: Performed by: HOSPITALIST

## 2021-12-13 PROCEDURE — 360N000077 HC SURGERY LEVEL 4, PER MIN: Performed by: ORTHOPAEDIC SURGERY

## 2021-12-13 PROCEDURE — C1776 JOINT DEVICE (IMPLANTABLE): HCPCS | Performed by: ORTHOPAEDIC SURGERY

## 2021-12-13 PROCEDURE — 250N000011 HC RX IP 250 OP 636: Performed by: ANESTHESIOLOGY

## 2021-12-13 PROCEDURE — 250N000009 HC RX 250: Performed by: NURSE ANESTHETIST, CERTIFIED REGISTERED

## 2021-12-13 PROCEDURE — 99220 PR INITIAL OBSERVATION CARE,LEVEL III: CPT | Performed by: INTERNAL MEDICINE

## 2021-12-13 PROCEDURE — 258N000001 HC RX 258: Performed by: ORTHOPAEDIC SURGERY

## 2021-12-13 PROCEDURE — 258N000003 HC RX IP 258 OP 636: Performed by: PHYSICIAN ASSISTANT

## 2021-12-13 PROCEDURE — 258N000003 HC RX IP 258 OP 636: Performed by: NURSE ANESTHETIST, CERTIFIED REGISTERED

## 2021-12-13 PROCEDURE — 250N000009 HC RX 250: Performed by: PHYSICIAN ASSISTANT

## 2021-12-13 PROCEDURE — 272N000001 HC OR GENERAL SUPPLY STERILE: Performed by: ORTHOPAEDIC SURGERY

## 2021-12-13 PROCEDURE — 278N000051 HC OR IMPLANT GENERAL: Performed by: ORTHOPAEDIC SURGERY

## 2021-12-13 PROCEDURE — 250N000013 HC RX MED GY IP 250 OP 250 PS 637: Performed by: PHYSICIAN ASSISTANT

## 2021-12-13 PROCEDURE — 250N000011 HC RX IP 250 OP 636: Performed by: NURSE ANESTHETIST, CERTIFIED REGISTERED

## 2021-12-13 PROCEDURE — 258N000003 HC RX IP 258 OP 636: Performed by: ANESTHESIOLOGY

## 2021-12-13 PROCEDURE — 250N000011 HC RX IP 250 OP 636: Performed by: PHYSICIAN ASSISTANT

## 2021-12-13 PROCEDURE — 73560 X-RAY EXAM OF KNEE 1 OR 2: CPT | Mod: RT

## 2021-12-13 PROCEDURE — 999N000141 HC STATISTIC PRE-PROCEDURE NURSING ASSESSMENT: Performed by: ORTHOPAEDIC SURGERY

## 2021-12-13 PROCEDURE — 710N000010 HC RECOVERY PHASE 1, LEVEL 2, PER MIN: Performed by: ORTHOPAEDIC SURGERY

## 2021-12-13 PROCEDURE — 370N000017 HC ANESTHESIA TECHNICAL FEE, PER MIN: Performed by: ORTHOPAEDIC SURGERY

## 2021-12-13 PROCEDURE — 250N000009 HC RX 250: Performed by: ANESTHESIOLOGY

## 2021-12-13 DEVICE — CRUCIATE RETAINING FEMORAL
Type: IMPLANTABLE DEVICE | Site: KNEE | Status: FUNCTIONAL
Brand: TRIATHLON

## 2021-12-13 DEVICE — FULL DOSE BONE CEMENT, 10 PACK CATALOG NUMBER IS 6191-1-010
Type: IMPLANTABLE DEVICE | Site: KNEE | Status: FUNCTIONAL
Brand: SIMPLEX

## 2021-12-13 DEVICE — PRIMARY TIBIAL BASEPLATE
Type: IMPLANTABLE DEVICE | Site: KNEE | Status: FUNCTIONAL
Brand: TRIATHLON

## 2021-12-13 DEVICE — TIBIAL BEARING INSERT - CS
Type: IMPLANTABLE DEVICE | Site: KNEE | Status: FUNCTIONAL
Brand: TRIATHLON

## 2021-12-13 RX ORDER — AMOXICILLIN 250 MG
1 CAPSULE ORAL 2 TIMES DAILY
Status: DISCONTINUED | OUTPATIENT
Start: 2021-12-13 | End: 2021-12-14 | Stop reason: HOSPADM

## 2021-12-13 RX ORDER — ACETAMINOPHEN 325 MG/1
975 TABLET ORAL EVERY 8 HOURS
Status: DISCONTINUED | OUTPATIENT
Start: 2021-12-13 | End: 2021-12-14 | Stop reason: HOSPADM

## 2021-12-13 RX ORDER — FENTANYL CITRATE 50 UG/ML
25 INJECTION, SOLUTION INTRAMUSCULAR; INTRAVENOUS EVERY 5 MIN PRN
Status: DISCONTINUED | OUTPATIENT
Start: 2021-12-13 | End: 2021-12-13 | Stop reason: HOSPADM

## 2021-12-13 RX ORDER — PROCHLORPERAZINE MALEATE 5 MG
5 TABLET ORAL EVERY 6 HOURS PRN
Status: DISCONTINUED | OUTPATIENT
Start: 2021-12-13 | End: 2021-12-14 | Stop reason: HOSPADM

## 2021-12-13 RX ORDER — ONDANSETRON 4 MG/1
4 TABLET, ORALLY DISINTEGRATING ORAL EVERY 30 MIN PRN
Status: DISCONTINUED | OUTPATIENT
Start: 2021-12-13 | End: 2021-12-13 | Stop reason: HOSPADM

## 2021-12-13 RX ORDER — OXYCODONE HYDROCHLORIDE 5 MG/1
5 TABLET ORAL EVERY 4 HOURS PRN
Status: DISCONTINUED | OUTPATIENT
Start: 2021-12-13 | End: 2021-12-13 | Stop reason: HOSPADM

## 2021-12-13 RX ORDER — HYDROXYZINE HYDROCHLORIDE 10 MG/1
10 TABLET, FILM COATED ORAL EVERY 6 HOURS PRN
Status: DISCONTINUED | OUTPATIENT
Start: 2021-12-13 | End: 2021-12-14 | Stop reason: HOSPADM

## 2021-12-13 RX ORDER — OXYCODONE HYDROCHLORIDE 5 MG/1
5 TABLET ORAL EVERY 4 HOURS PRN
Status: DISCONTINUED | OUTPATIENT
Start: 2021-12-13 | End: 2021-12-14 | Stop reason: HOSPADM

## 2021-12-13 RX ORDER — NALOXONE HYDROCHLORIDE 0.4 MG/ML
0.4 INJECTION, SOLUTION INTRAMUSCULAR; INTRAVENOUS; SUBCUTANEOUS
Status: DISCONTINUED | OUTPATIENT
Start: 2021-12-13 | End: 2021-12-14 | Stop reason: HOSPADM

## 2021-12-13 RX ORDER — FENTANYL CITRATE 50 UG/ML
50 INJECTION, SOLUTION INTRAMUSCULAR; INTRAVENOUS
Status: DISCONTINUED | OUTPATIENT
Start: 2021-12-13 | End: 2021-12-13 | Stop reason: HOSPADM

## 2021-12-13 RX ORDER — POLYETHYLENE GLYCOL 3350 17 G/17G
17 POWDER, FOR SOLUTION ORAL DAILY
Status: DISCONTINUED | OUTPATIENT
Start: 2021-12-14 | End: 2021-12-14 | Stop reason: HOSPADM

## 2021-12-13 RX ORDER — ONDANSETRON 2 MG/ML
4 INJECTION INTRAMUSCULAR; INTRAVENOUS EVERY 30 MIN PRN
Status: DISCONTINUED | OUTPATIENT
Start: 2021-12-13 | End: 2021-12-13 | Stop reason: HOSPADM

## 2021-12-13 RX ORDER — ONDANSETRON 4 MG/1
4 TABLET, ORALLY DISINTEGRATING ORAL EVERY 6 HOURS PRN
Status: DISCONTINUED | OUTPATIENT
Start: 2021-12-13 | End: 2021-12-14 | Stop reason: HOSPADM

## 2021-12-13 RX ORDER — CEFAZOLIN SODIUM 2 G/100ML
2 INJECTION, SOLUTION INTRAVENOUS SEE ADMIN INSTRUCTIONS
Status: DISCONTINUED | OUTPATIENT
Start: 2021-12-13 | End: 2021-12-13 | Stop reason: HOSPADM

## 2021-12-13 RX ORDER — HYDROMORPHONE HCL IN WATER/PF 6 MG/30 ML
0.2 PATIENT CONTROLLED ANALGESIA SYRINGE INTRAVENOUS
Status: DISCONTINUED | OUTPATIENT
Start: 2021-12-13 | End: 2021-12-14 | Stop reason: HOSPADM

## 2021-12-13 RX ORDER — SODIUM CHLORIDE, SODIUM LACTATE, POTASSIUM CHLORIDE, CALCIUM CHLORIDE 600; 310; 30; 20 MG/100ML; MG/100ML; MG/100ML; MG/100ML
INJECTION, SOLUTION INTRAVENOUS CONTINUOUS
Status: DISCONTINUED | OUTPATIENT
Start: 2021-12-13 | End: 2021-12-14 | Stop reason: HOSPADM

## 2021-12-13 RX ORDER — ATORVASTATIN CALCIUM 40 MG/1
40 TABLET, FILM COATED ORAL AT BEDTIME
Status: DISCONTINUED | OUTPATIENT
Start: 2021-12-13 | End: 2021-12-14 | Stop reason: HOSPADM

## 2021-12-13 RX ORDER — LISINOPRIL 10 MG/1
10 TABLET ORAL DAILY
Status: DISCONTINUED | OUTPATIENT
Start: 2021-12-14 | End: 2021-12-14 | Stop reason: HOSPADM

## 2021-12-13 RX ORDER — CEFAZOLIN SODIUM 2 G/100ML
2 INJECTION, SOLUTION INTRAVENOUS
Status: COMPLETED | OUTPATIENT
Start: 2021-12-13 | End: 2021-12-13

## 2021-12-13 RX ORDER — NALOXONE HYDROCHLORIDE 0.4 MG/ML
0.2 INJECTION, SOLUTION INTRAMUSCULAR; INTRAVENOUS; SUBCUTANEOUS
Status: DISCONTINUED | OUTPATIENT
Start: 2021-12-13 | End: 2021-12-14 | Stop reason: HOSPADM

## 2021-12-13 RX ORDER — HALOPERIDOL 5 MG/ML
1 INJECTION INTRAMUSCULAR
Status: DISCONTINUED | OUTPATIENT
Start: 2021-12-13 | End: 2021-12-13 | Stop reason: HOSPADM

## 2021-12-13 RX ORDER — TRANEXAMIC ACID 650 MG/1
1950 TABLET ORAL ONCE
Status: COMPLETED | OUTPATIENT
Start: 2021-12-13 | End: 2021-12-13

## 2021-12-13 RX ORDER — PROPOFOL 10 MG/ML
INJECTION, EMULSION INTRAVENOUS CONTINUOUS PRN
Status: DISCONTINUED | OUTPATIENT
Start: 2021-12-13 | End: 2021-12-13

## 2021-12-13 RX ORDER — CEFAZOLIN SODIUM 2 G/100ML
2 INJECTION, SOLUTION INTRAVENOUS EVERY 8 HOURS
Status: COMPLETED | OUTPATIENT
Start: 2021-12-13 | End: 2021-12-14

## 2021-12-13 RX ORDER — MAGNESIUM SULFATE 4 G/50ML
4 INJECTION INTRAVENOUS ONCE
Status: COMPLETED | OUTPATIENT
Start: 2021-12-13 | End: 2021-12-14

## 2021-12-13 RX ORDER — ACETAMINOPHEN 325 MG/1
975 TABLET ORAL ONCE
Status: DISCONTINUED | OUTPATIENT
Start: 2021-12-13 | End: 2021-12-13 | Stop reason: HOSPADM

## 2021-12-13 RX ORDER — SODIUM CHLORIDE, SODIUM LACTATE, POTASSIUM CHLORIDE, CALCIUM CHLORIDE 600; 310; 30; 20 MG/100ML; MG/100ML; MG/100ML; MG/100ML
INJECTION, SOLUTION INTRAVENOUS CONTINUOUS
Status: DISCONTINUED | OUTPATIENT
Start: 2021-12-13 | End: 2021-12-13 | Stop reason: HOSPADM

## 2021-12-13 RX ORDER — ACETAMINOPHEN 325 MG/1
650 TABLET ORAL EVERY 4 HOURS PRN
Qty: 100 TABLET | Refills: 0 | Status: ON HOLD | OUTPATIENT
Start: 2021-12-13 | End: 2022-07-06

## 2021-12-13 RX ORDER — LIDOCAINE 40 MG/G
CREAM TOPICAL
Status: DISCONTINUED | OUTPATIENT
Start: 2021-12-13 | End: 2021-12-14 | Stop reason: HOSPADM

## 2021-12-13 RX ORDER — OXYCODONE HYDROCHLORIDE 5 MG/1
10 TABLET ORAL EVERY 4 HOURS PRN
Status: DISCONTINUED | OUTPATIENT
Start: 2021-12-13 | End: 2021-12-14 | Stop reason: HOSPADM

## 2021-12-13 RX ORDER — ONDANSETRON 2 MG/ML
4 INJECTION INTRAMUSCULAR; INTRAVENOUS EVERY 6 HOURS PRN
Status: DISCONTINUED | OUTPATIENT
Start: 2021-12-13 | End: 2021-12-14 | Stop reason: HOSPADM

## 2021-12-13 RX ORDER — HYDROXYZINE HYDROCHLORIDE 10 MG/1
10 TABLET, FILM COATED ORAL EVERY 6 HOURS PRN
Qty: 30 TABLET | Refills: 0 | Status: SHIPPED | OUTPATIENT
Start: 2021-12-13 | End: 2022-03-02

## 2021-12-13 RX ORDER — CARVEDILOL 6.25 MG/1
12.5 TABLET ORAL 2 TIMES DAILY WITH MEALS
Status: DISCONTINUED | OUTPATIENT
Start: 2021-12-13 | End: 2021-12-14 | Stop reason: HOSPADM

## 2021-12-13 RX ORDER — HYDROMORPHONE HCL IN WATER/PF 6 MG/30 ML
0.4 PATIENT CONTROLLED ANALGESIA SYRINGE INTRAVENOUS
Status: DISCONTINUED | OUTPATIENT
Start: 2021-12-13 | End: 2021-12-14 | Stop reason: HOSPADM

## 2021-12-13 RX ORDER — KETAMINE HYDROCHLORIDE 50 MG/ML
INJECTION, SOLUTION INTRAMUSCULAR; INTRAVENOUS PRN
Status: DISCONTINUED | OUTPATIENT
Start: 2021-12-13 | End: 2021-12-13

## 2021-12-13 RX ORDER — CEFADROXIL 500 MG/1
500 CAPSULE ORAL 2 TIMES DAILY
Qty: 14 CAPSULE | Refills: 0 | Status: SHIPPED | OUTPATIENT
Start: 2021-12-13 | End: 2022-03-02

## 2021-12-13 RX ORDER — LIDOCAINE 40 MG/G
CREAM TOPICAL
Status: DISCONTINUED | OUTPATIENT
Start: 2021-12-13 | End: 2021-12-13 | Stop reason: HOSPADM

## 2021-12-13 RX ORDER — ACETAMINOPHEN 325 MG/1
975 TABLET ORAL ONCE
Status: COMPLETED | OUTPATIENT
Start: 2021-12-13 | End: 2021-12-13

## 2021-12-13 RX ORDER — DEXAMETHASONE SODIUM PHOSPHATE 4 MG/ML
INJECTION, SOLUTION INTRA-ARTICULAR; INTRALESIONAL; INTRAMUSCULAR; INTRAVENOUS; SOFT TISSUE PRN
Status: DISCONTINUED | OUTPATIENT
Start: 2021-12-13 | End: 2021-12-13

## 2021-12-13 RX ORDER — BISACODYL 10 MG
10 SUPPOSITORY, RECTAL RECTAL DAILY PRN
Status: DISCONTINUED | OUTPATIENT
Start: 2021-12-13 | End: 2021-12-14 | Stop reason: HOSPADM

## 2021-12-13 RX ORDER — ONDANSETRON 2 MG/ML
INJECTION INTRAMUSCULAR; INTRAVENOUS PRN
Status: DISCONTINUED | OUTPATIENT
Start: 2021-12-13 | End: 2021-12-13

## 2021-12-13 RX ORDER — BUPIVACAINE HYDROCHLORIDE AND EPINEPHRINE 5; 5 MG/ML; UG/ML
INJECTION, SOLUTION PERINEURAL
Status: COMPLETED | OUTPATIENT
Start: 2021-12-13 | End: 2021-12-13

## 2021-12-13 RX ORDER — ACETAMINOPHEN 325 MG/1
650 TABLET ORAL EVERY 4 HOURS PRN
Status: DISCONTINUED | OUTPATIENT
Start: 2021-12-16 | End: 2021-12-14 | Stop reason: HOSPADM

## 2021-12-13 RX ORDER — OXYCODONE HYDROCHLORIDE 5 MG/1
5-10 TABLET ORAL
Qty: 40 TABLET | Refills: 0 | Status: SHIPPED | OUTPATIENT
Start: 2021-12-13 | End: 2022-03-02

## 2021-12-13 RX ORDER — HYDROMORPHONE HCL IN WATER/PF 6 MG/30 ML
0.4 PATIENT CONTROLLED ANALGESIA SYRINGE INTRAVENOUS EVERY 5 MIN PRN
Status: DISCONTINUED | OUTPATIENT
Start: 2021-12-13 | End: 2021-12-13 | Stop reason: HOSPADM

## 2021-12-13 RX ORDER — AMOXICILLIN 250 MG
1-2 CAPSULE ORAL 2 TIMES DAILY
Qty: 30 TABLET | Refills: 0 | Status: ON HOLD | OUTPATIENT
Start: 2021-12-13 | End: 2022-07-06

## 2021-12-13 RX ADMIN — KETAMINE HYDROCHLORIDE 30 MG: 50 INJECTION, SOLUTION INTRAMUSCULAR; INTRAVENOUS at 10:27

## 2021-12-13 RX ADMIN — BUPIVACAINE HYDROCHLORIDE AND EPINEPHRINE BITARTRATE 15 ML: 5; .005 INJECTION, SOLUTION PERINEURAL at 09:48

## 2021-12-13 RX ADMIN — ACETAMINOPHEN 975 MG: 325 TABLET ORAL at 17:46

## 2021-12-13 RX ADMIN — ONDANSETRON 4 MG: 2 INJECTION INTRAMUSCULAR; INTRAVENOUS at 10:29

## 2021-12-13 RX ADMIN — PHENYLEPHRINE HYDROCHLORIDE 0.3 MCG/KG/MIN: 10 INJECTION INTRAVENOUS at 10:22

## 2021-12-13 RX ADMIN — HYDROMORPHONE HYDROCHLORIDE 0.4 MG: 0.2 INJECTION, SOLUTION INTRAMUSCULAR; INTRAVENOUS; SUBCUTANEOUS at 16:07

## 2021-12-13 RX ADMIN — ONDANSETRON 4 MG: 4 TABLET, ORALLY DISINTEGRATING ORAL at 19:11

## 2021-12-13 RX ADMIN — PROPOFOL 100 MCG/KG/MIN: 10 INJECTION, EMULSION INTRAVENOUS at 10:15

## 2021-12-13 RX ADMIN — CEFAZOLIN SODIUM 2 G: 2 INJECTION, SOLUTION INTRAVENOUS at 10:05

## 2021-12-13 RX ADMIN — CEFAZOLIN SODIUM 2 G: 2 INJECTION, SOLUTION INTRAVENOUS at 17:46

## 2021-12-13 RX ADMIN — DEXAMETHASONE SODIUM PHOSPHATE 10 MG: 4 INJECTION, SOLUTION INTRA-ARTICULAR; INTRALESIONAL; INTRAMUSCULAR; INTRAVENOUS; SOFT TISSUE at 10:29

## 2021-12-13 RX ADMIN — MIDAZOLAM HYDROCHLORIDE 1 MG: 1 INJECTION, SOLUTION INTRAMUSCULAR; INTRAVENOUS at 09:43

## 2021-12-13 RX ADMIN — CARVEDILOL 12.5 MG: 6.25 TABLET, FILM COATED ORAL at 21:34

## 2021-12-13 RX ADMIN — FENTANYL CITRATE 25 MCG: 50 INJECTION INTRAMUSCULAR; INTRAVENOUS at 14:05

## 2021-12-13 RX ADMIN — SENNOSIDES AND DOCUSATE SODIUM 1 TABLET: 50; 8.6 TABLET ORAL at 21:34

## 2021-12-13 RX ADMIN — TRANEXAMIC ACID 1950 MG: 650 TABLET ORAL at 09:32

## 2021-12-13 RX ADMIN — HYDROMORPHONE HYDROCHLORIDE 0.4 MG: 0.2 INJECTION, SOLUTION INTRAMUSCULAR; INTRAVENOUS; SUBCUTANEOUS at 14:48

## 2021-12-13 RX ADMIN — SODIUM CHLORIDE, POTASSIUM CHLORIDE, SODIUM LACTATE AND CALCIUM CHLORIDE: 600; 310; 30; 20 INJECTION, SOLUTION INTRAVENOUS at 11:04

## 2021-12-13 RX ADMIN — SODIUM CHLORIDE, POTASSIUM CHLORIDE, SODIUM LACTATE AND CALCIUM CHLORIDE: 600; 310; 30; 20 INJECTION, SOLUTION INTRAVENOUS at 10:05

## 2021-12-13 RX ADMIN — ATORVASTATIN CALCIUM 40 MG: 40 TABLET, FILM COATED ORAL at 21:34

## 2021-12-13 RX ADMIN — FENTANYL CITRATE 100 MCG: 50 INJECTION INTRAMUSCULAR; INTRAVENOUS at 09:43

## 2021-12-13 RX ADMIN — FENTANYL CITRATE 25 MCG: 50 INJECTION INTRAMUSCULAR; INTRAVENOUS at 14:29

## 2021-12-13 RX ADMIN — MAGNESIUM SULFATE HEPTAHYDRATE 4 G: 80 INJECTION, SOLUTION INTRAVENOUS at 09:27

## 2021-12-13 RX ADMIN — FENTANYL CITRATE 25 MCG: 50 INJECTION INTRAMUSCULAR; INTRAVENOUS at 14:42

## 2021-12-13 RX ADMIN — FENTANYL CITRATE 25 MCG: 50 INJECTION INTRAMUSCULAR; INTRAVENOUS at 14:17

## 2021-12-13 RX ADMIN — MEPIVACAINE HYDROCHLORIDE 3 ML: 15 INJECTION, SOLUTION EPIDURAL; INFILTRATION at 10:12

## 2021-12-13 RX ADMIN — SODIUM CHLORIDE, POTASSIUM CHLORIDE, SODIUM LACTATE AND CALCIUM CHLORIDE: 600; 310; 30; 20 INJECTION, SOLUTION INTRAVENOUS at 18:04

## 2021-12-13 RX ADMIN — ACETAMINOPHEN 975 MG: 325 TABLET ORAL at 09:31

## 2021-12-13 ASSESSMENT — MIFFLIN-ST. JEOR: SCORE: 1785.49

## 2021-12-13 NOTE — ANESTHESIA CARE TRANSFER NOTE
Patient: Harrison Barrientos    Procedure: Procedure(s):  RIGHT TOTAL KNEE ARTHROPLASTY       Diagnosis: Osteoarthritis of right knee [M17.11]  Diagnosis Additional Information: No value filed.    Anesthesia Type:   Spinal     Note:    Oropharynx: oropharynx clear of all foreign objects and spontaneously breathing  Level of Consciousness: drowsy  Oxygen Supplementation: face mask  Level of Supplemental Oxygen (L/min / FiO2): 6  Independent Airway: airway patency satisfactory and stable  Dentition: dentition unchanged  Vital Signs Stable: post-procedure vital signs reviewed and stable  Report to RN Given: handoff report given  Patient transferred to: PACU    Handoff Report: Identifed the Patient, Identified the Reponsible Provider, Reviewed the pertinent medical history, Discussed the surgical course, Reviewed Intra-OP anesthesia mangement and issues during anesthesia, Set expectations for post-procedure period and Allowed opportunity for questions and acknowledgement of understanding      Vitals:  Vitals Value Taken Time   /65 12/13/21 1150   Temp 36.8  C (98.3  F) 12/13/21 1150   Pulse 53 12/13/21 1153   Resp 20 12/13/21 1153   SpO2 98 % 12/13/21 1153   Vitals shown include unvalidated device data.    Electronically Signed By: NAVNEET Menjivar CRNA  December 13, 2021  11:54 AM

## 2021-12-13 NOTE — OR NURSING
This RN attempted to insert straight cath per protocol for bladder scan of 500 ml. Patient had spinal anesthesia and no urge to void sensation to groin at time of straight cath. When inserted cath fully, no resistance noted, 50ml amount of urine then blood followed. Urine noted in tubing when balloon inflated then blood clot in tubing noted. Patient denies pain with cath  Insertion. Called surgeon and consult for urology ordered. Spoke with urology PA and will continue to monitor. Instructed to deflate balloon and remove cath, observed blood clot at removal and urine followed. Urology to see pt in recovery.

## 2021-12-13 NOTE — CONSULTS
[facility] CONSULT - URINARY RETENTION     Type of Consult: pacu   Place of Service:  [facility]   Reason for Consultation: Urinary retention   Consult Requested by: Dr. Estrada    History of present illness:  Harrison Barrientos  is a 71 year old male that was admitted for elective outpatient right total knee arthroplasty with a urologic history significant for enlarged prostate s/p TURP by Dr. Cuadra in  2018.  Urology was requested to place an indwelling catheter after failed attempts by nursing.    After surgery patient stating suprapubic abdominal discomfort. Bladder scan with over 500 ml urine. Unsuccessful attempt at straight catheterization by RN. Rn did states she tried to place a smaller 14 morse cathete, morse balloon inflated after insertion to hub without urinary return. She then notified [facility]. I recommended removal of morse via phone conversation. Per RN red blood and clots upon morse catheter removal.    Patient states suprapubic abdominal pressure. No difficulties voiding prior to elective surgery. He does states prior TURP by Dr. Cuadra in 2018 and episode of hematuria last year, with negative cystoscopy and work up for malignancy.   Past medical history :  Past Medical History:   Diagnosis Date     Antiplatelet or antithrombotic long-term use      Arthritis      BPH (benign prostatic hyperplasia)      Coronary artery disease      Diverticulosis      Hyperlipidemia      Hypertension      Ischemic cardiomyopathy      Myocardial infarction (H) 2004     Prostate hyperplasia, benign localized, without urinary obstruction      Squamous cell carcinoma of skin      Stented coronary artery        Past surgical history:   Past Surgical History:   Procedure Laterality Date     CORONARY STENT PLACEMENT       CYSTOSCOPY PROSTATE W/ LASER N/A 11/29/2016    Procedure: CYSTOSCOPY WITH TRANSURETHRAL RESECTION OF THE PROSTATE WITH QUANTA LASER;  Surgeon: Tre Cuadra MD;  [facility];  Service:        Social history:  Social History     Socioeconomic History     Marital status:      Spouse name: Not on file     Number of children: Not on file     Years of education: Not on file     Highest education level: Not on file   Occupational History     Not on file   Tobacco Use     Smoking status: Never Smoker     Smokeless tobacco: Never Used   Substance and Sexual Activity     Alcohol use: Yes     Comment: Alcoholic Drinks/day: occasionally     Drug use: No     Sexual activity: Not on file   Other Topics Concern     Not on file   Social History Narrative     Not on file     Social Determinants of Health     Financial Resource Strain: Not on file   Food Insecurity: Not on file   Transportation Needs: Not on file   Physical Activity: Not on file   Stress: Not on file   Social Connections: Not on file   Intimate Partner Violence: Not on file   Housing Stability: Not on file       Medications  Current Facility-Administered Medications   Medication     fentaNYL (PF) (SUBLIMAZE) injection 25 mcg     haloperidol lactate (HALDOL) injection 1 mg     HYDROmorphone (DILAUDID) injection 0.4 mg     lactated ringers infusion     ondansetron (ZOFRAN-ODT) ODT tab 4 mg    Or     ondansetron (ZOFRAN) injection 4 mg     oxyCODONE (ROXICODONE) tablet 5 mg       Allergies  No Known Allergies    Review of systems   12 point review of systems is otherwise negative except what is stated in the HPI.     Physical examinations:  BP (!) 162/83   Pulse 76   Temp 97.2  F (36.2  C)   Resp 19   Ht 1.88 m (6' 2\")   Wt 95.1 kg (209 lb 9.6 oz)   SpO2 96%   BMI 26.91 kg/m     GENERAL: NAD, alert, cooperative  HEAD: normocephalic/atraumatic   ABDOMEN: soft, non tender, non distended, + suprapubic fulness/tenderness noted,  : circumcised male with red blood at urethral meatus.    SKIN: no rashes or lesions  MUSCULOSKELETAL: + ace wrap to right knee. no pedal edema  PSYCHOLOGICAL: alert and oriented, answers " questions appropriately      LABS:   Lab Results   Component Value Date    WBC 5.5 12/10/2021    HGB 16.1 12/10/2021    HCT 49.3 12/10/2021     12/10/2021    CHOL 96 12/14/2020    TRIG 70 12/14/2020    HDL 33 (L) 12/14/2020    ALT 25 12/14/2020    AST 18 12/14/2020     12/10/2021    BUN 16 12/10/2021    CO2 24 12/10/2021    PSA 3.0 12/14/2020    INR 1.06 12/10/2021       ASSESSMENT/PLAN:  Harrison Barrientos is being seen by [facility] for post operative  urinary retention.       - 18 f coude catheter placed ( See Dr. Mai cystoscopy note)  -suspect retention related to anesthesia and will be transient.   - recommend trying to decrease or stop medication that may be contributing to retention  - ensure the patient is on a bowel regimen  - recommend  maintaining catheter, for 5-7 days, and completing trial void as an outpatient   -Office will notify patient for cvding trial at [facility] in approximately 5-7 days. Office staff will notify [atietn to schedule apt. Office information in discharge navigator.   - Old records reviewed - prior hospitalization.       The patient and I discussed the signs/ symptoms and etiologies of urinary retention. We also discussed treatment options and their alternatives, including the risks and benefits of each. We discussed the importance of treatment and that left untreated urinary retention can lead to infection, leaking, renal dysfunction and bladder rupture. Patient demonstrated understanding. All questions and concerns were answered in detail.    This case was discussed with:  Dr Miguel Mai     Thank you for consulting [facility] regarding this patient's care. Please contact us with questions or concerns.     Bhakti Roberson PA-C,  [facility]      [facility] - CATHETER PLACEMENT PROCEDURE NOTe    Type of Consult: PACU  Place of Service: [facility]  Reason for Procedure: difficult catheter placement after failed attempts by  nursing  Requested by: Dr. Pollack    Procedure:  The patient's urethra was prepped with Betadine solution. A 20F 3 way followed by 18F coude morse catheter was liberally lubricated and inserted with moderate difficulty. Procedure was aborted and Dr. Mai notified. Please see Dr. Mai cystoscopy note for morse catheter placement      Please contact [facility] with any questions or concerns [phone].      Bhakti Roberson PA-C  [facility]   [phone]

## 2021-12-13 NOTE — ANESTHESIA POSTPROCEDURE EVALUATION
Patient: Harrison Barrientos    Procedure: Procedure(s):  RIGHT TOTAL KNEE ARTHROPLASTY       Diagnosis:Osteoarthritis of right knee [M17.11]  Diagnosis Additional Information: No value filed.    Anesthesia Type:  Spinal    Note:  Disposition: Admission   Postop Pain Control: Uneventful            Sign Out: Well controlled pain   PONV: No   Neuro/Psych: Uneventful            Sign Out: Acceptable/Baseline neuro status   Airway/Respiratory: Uneventful            Sign Out: Acceptable/Baseline resp. status   CV/Hemodynamics: Uneventful            Sign Out: Acceptable CV status; No obvious hypovolemia; No obvious fluid overload   Other NRE: NONE   DID A NON-ROUTINE EVENT OCCUR? No           Last vitals:  Vitals Value Taken Time   /86 12/13/21 1401   Temp 36.7  C (98  F) 12/13/21 1230   Pulse 65 12/13/21 1401   Resp 16 12/13/21 1401   SpO2 98 % 12/13/21 1401   Vitals shown include unvalidated device data.    Electronically Signed By: ZOE BRAXTON MD  December 13, 2021  2:03 PM

## 2021-12-13 NOTE — ANESTHESIA PROCEDURE NOTES
Adductor canal Procedure Note    Pre-Procedure   Staff -        Anesthesiologist:  Ashvin Pineda MD       Performed By: anesthesiologist       Location: pre-op       Procedure Start/Stop Times: 12/13/2021 9:40 AM and 12/13/2021 9:48 AM       Pre-Anesthestic Checklist: patient identified, IV checked, site marked, risks and benefits discussed, informed consent, monitors and equipment checked, pre-op evaluation, at physician/surgeon's request and post-op pain management  Timeout:       Correct Patient: Yes        Correct Procedure: Yes        Correct Site: Yes        Correct Position: Yes        Correct Laterality: Yes        Site Marked: Yes  Procedure Documentation  Procedure: Adductor canal       Laterality: right       Patient Position: supine       Patient Prep/Sterile Barriers: sterile gloves, mask       Skin prep: Chloraprep       Needle Type: short bevel       Needle Gauge: 21.        Needle Length (Inches): 4        Ultrasound guided       1. Ultrasound was used to identify targeted nerve, plexus, vascular marker, or fascial plane and place a needle adjacent to it in real-time.       2. Ultrasound was used to visualize the spread of anesthetic in close proximity to the above referenced structure.       3. A permanent image is entered into the patient's record.       4. The visualized anatomic structures appeared normal.       5. There were no apparent abnormal pathologic findings.    Assessment/Narrative         The placement was negative for: blood aspirated, painful injection and site bleeding       Paresthesias: No.     Bolus given via needle..        Secured via.        Insertion/Infusion Method: Single Shot       Complications: none       Injection made incrementally with aspirations every 5 mL.    Medication(s) Administered   Bupivacaine 0.5% w/ 1:200K Epi (Other), 15 mL  Medication Administration Time: 12/13/2021 9:48 AM

## 2021-12-13 NOTE — OP NOTE
Operative Note    Name:  Harrison Barrientos  Location: [facility] Main OR  Procedure Date:  12/13/2021  PCP:  Brad Roldan      Procedure(s):  RIGHT TOTAL KNEE ARTHROPLASTY     Implant Name Type Inv. Item Serial No.  Lot No. LRB No. Used Action   BONE CEMENT SIMPLEX FULL DOSE 6191-1-001 - [id number] Cement, Bone BONE CEMENT SIMPLEX FULL DOSE 6191-1-001  [employer] [id number] Right 1 Implanted   BONE CEMENT SIMPLEX FULL DOSE 6191-1-001 - [id number] Cement, Bone BONE CEMENT SIMPLEX FULL DOSE 6191-1-001  [employer] [id number] Right 1 Implanted   IMP COMP FEM STRK TRIATHLN CR RT 6 5510-F-602 - [id number] Total Joint Component/Insert IMP COMP FEM STRK TRIATHLN CR RT 6 5510-F-602  [employer] [id number] Right 1 Implanted   TRIATHLON X3 TIBIAL BEARING INSERT-CS    [employer] [id number] Right 1 Implanted   IMP BASEPLATE TIBIAL HOWM TRI 7 5520-B-700 - [id number] Total Joint Component/Insert IMP BASEPLATE TIBIAL HOWM TRI 7 5520-B-700  [employer] [id number] Right 1 Implanted       Pre-Procedure Diagnosis:  Osteoarthritis of right knee [M17.11]     Post-Procedure Diagnosis:    same    Surgeon(s):  Magdi Estrada, Vince Nj, ALBIN      Assistants:  Chapin Sullivan PA-C,  Rolanda Heller, BERT for patient positioning, intraoperative retraction, patient safety, and wound closure.    Anesthesia Type:  Spinal with Adductor Block     Findings:  Arthritis    Operative Report:      After satisfactory infiltration of regional block anesthetic in the preoperative holding area, the patient was taken to the operating room.  Spinal anesthesia was administered.  The patient's operative extremity was then prepped and draped sterile.  A timeout was then taken to ensure proper surgical site.  A medial parapatellar arthrotomy was then performed.  The menisci and fat pad were sacrificed.  The distal femoral cut was made taking 10mm off, 5 degrees valgus.  Osteophytes were then removed.  Proximal tibia cut was then  made.  The proper rotation of the distal femur was then set, sized, chamfer cuts were made.  At this point an intraoperative standardized cocktail was infiltrated around the periarticular soft tissues.  Trials were implanted.  Patella was everted and denervated with cautery.  Trials showed excellent flexion and extension gaps, excellent range of motion with excellent patellar tracking.  Trials were removed and the tibial keel punch was then made.  The wound then copiously irrigated.  Components then impacted into place.  The wound irrigated once more.  The wound then closed in layers: #1 Vicryl for the extensor mechanism, 2-0 Vicryl for the subcutaneous tissues, 3-0 Monocryl for the skin followed by Dermabond.  Sterile dressings were applied.  The patient was then awakened and taken to the PAR stable.    Plan:  Initiate DVT prophylaxis protocol including early ambulation, mechanical and chemical prophylaxis.    Estimated Blood Loss:   50cc       Complications:    None    Magdi Estrada MD     Date: 12/13/2021  Time: 11:24 AM

## 2021-12-13 NOTE — PROGRESS NOTES
[facility] Progress Note    Patient had a right TKA today but had retention in PACU.  Of note, patient has had a TURP about 3 years ago by Dr. Cuadra.  He states that his force of stream is mildly less than the TURP but much better than prior to the TURP.    Straight catheterization was unsuccessful.  A morse catheter was placed by the nurses and the balloon was inflated.  It's unclear whether there was urine return and/or if the catheter was inserted completely/all the way before the balloon was inflated.  There was blood per urethra after the balloon was inflated.     My P.A.s were unable to place a catheter as well.    I recommended bedside cystoscopy and catheter placement.    Under sterile conditions, bedside flexible cystoscopy was performed.  There were several areas of urethral irritation in the bulbar urethra and a small false passage on the left side. I was able to easily get into the bladder through the prostate.  There did not appear to be an urethral stricture.  I did not get a good look at the prostate or bladder due to gross hematuria.  A [place] wire was placed into the bladder.  Over the wire, an 18 Fr Councill catheter was placed without resistance.  10 mL of water was placed into the balloon.  I irrigated out about 15 mL of blood clot and then the bladder irrigated clear.    Keep catheter for 5-7 days and then do voiding trial.    [facility]uel Mai MD

## 2021-12-13 NOTE — ANESTHESIA PROCEDURE NOTES
Intrathecal injection Procedure Note    Pre-Procedure   Staff -        Anesthesiologist:  Ashvin Pineda MD       Performed By: anesthesiologist       Location: OR       Procedure Start/Stop Times: 12/13/2021 10:08 AM and 12/13/2021 10:12 AM       Pre-Anesthestic Checklist: patient identified, IV checked, risks and benefits discussed, informed consent, monitors and equipment checked and pre-op evaluation  Timeout:       Correct Patient: Yes        Correct Procedure: Yes        Correct Site: Yes        Correct Position: Yes   Procedure Documentation  Procedure: intrathecal injection       Patient Position: sitting       Patient Prep/Sterile Barriers: sterile gloves, patient draped       Skin prep: Chloraprep       Insertion Site: L3-4. (midline approach).       Needle Gauge: 25.        Needle Length (Inches): 3.5        Spinal Needle Type: Pencan       Introducer used       Introducer: 20 G       # of attempts: 1 and  # of redirects:  0    Assessment/Narrative         Paresthesias: No.       CSF fluid: clear.    Medication(s) Administered   1.5% Mepivacaine PF (Intrathecal), 3 mL  Medication Administration Time: 12/13/2021 10:12 AM

## 2021-12-13 NOTE — TELEPHONE ENCOUNTER
----- Message from Marline Ohara MD sent at 12/10/2021  1:21 PM CST -----  Yes, I can see him in follow up in march or April 2022  ----- Message -----  From: Brad Roldan MD  Sent: 12/10/2021  10:58 AM CST  To: Marline Ohara MD    I will disregard this note with the updated information. Do you want to see him in 2-3 months to decide on the duration of Eliquis?     Thanks,    Chandrakant    ----- Message -----  From: Marline Ohara MD  Sent: 12/9/2021   4:37 PM CST  To: Brad Roldan MD, Magdi Estrada MD    Cardiac MR shows thrombus formation in his left ventricle. Recommend anticoagulation. I know his surgery scheduled for this Monday. Thinking that we should do Lovenox and then post surgery can start him on Eliquis 5 mg twice daily. Let me know if you would be in agreement with this.

## 2021-12-13 NOTE — ANESTHESIA PREPROCEDURE EVALUATION
Anesthesia Pre-Procedure Evaluation    Patient: Harirson Barrientos   MRN: [id number] : 1950        Preoperative Diagnosis: Osteoarthritis of right knee [M17.11]    Procedure : Procedure(s):  RIGHT TOTAL KNEE ARTHROPLASTY          Past Medical History:   Diagnosis Date     Antiplatelet or antithrombotic long-term use      Arthritis      BPH (benign prostatic hyperplasia)      Coronary artery disease      Diverticulosis      Hyperlipidemia      Hypertension      Ischemic cardiomyopathy      Myocardial infarction (H)      Prostate hyperplasia, benign localized, without urinary obstruction      Squamous cell carcinoma of skin      Stented coronary artery       Past Surgical History:   Procedure Laterality Date     CORONARY STENT PLACEMENT       CYSTOSCOPY PROSTATE W/ LASER N/A 2016    Procedure: CYSTOSCOPY WITH TRANSURETHRAL RESECTION OF THE PROSTATE WITH QUANTA LASER;  Surgeon: Tre Cuadra MD;  [place] [facility] [place]  Service:       No Known Allergies   Social History     Tobacco Use     Smoking status: Never Smoker     Smokeless tobacco: Never Used   Substance Use Topics     Alcohol use: Yes     Comment: Alcoholic Drinks/day: occasionally      Wt Readings from Last 1 Encounters:   12/10/21 97.3 kg (214 lb 6.4 oz)        Anesthesia Evaluation   Pt has had prior anesthetic.     No history of anesthetic complications       ROS/MED HX  ENT/Pulmonary:       Neurologic:       Cardiovascular: Comment: Mural thrombus, CV OK'd patient to proceed with surgery.    (+) hypertension--CAD -past MI --Taking blood thinners CHF Last EF: 40     METS/Exercise Tolerance:     Hematologic:       Musculoskeletal:       GI/Hepatic:       Renal/Genitourinary: Comment: BPH.      Endo:       Psychiatric/Substance Use:       Infectious Disease:       Malignancy:       Other:            Physical Exam    Airway        Mallampati: II   TM distance: > 3 FB   Neck ROM: full     Respiratory Devices and Support          Dental  no notable dental history         Cardiovascular   cardiovascular exam normal          Pulmonary   pulmonary exam normal                OUTSIDE LABS:  CBC:   Lab Results   Component Value Date    WBC 5.5 12/10/2021    WBC 5.7 12/14/2020    HGB 16.1 12/10/2021    HGB 16.5 12/14/2020    HCT 49.3 12/10/2021    HCT 49.1 12/14/2020     12/10/2021     12/14/2020     BMP:   Lab Results   Component Value Date     12/10/2021     12/14/2020    POTASSIUM 4.5 12/10/2021    POTASSIUM 4.6 12/14/2020    CHLORIDE 105 12/10/2021    CHLORIDE 104 12/14/2020    CO2 24 12/10/2021    CO2 26 12/14/2020    BUN 16 12/10/2021    BUN 17 12/14/2020    CR 0.78 12/10/2021    CR 0.87 12/14/2020     12/10/2021    GLC 95 12/14/2020     COAGS:   Lab Results   Component Value Date    PTT 23 05/19/2004    INR 1.06 12/10/2021     POC: No results found for: BGM, HCG, HCGS  HEPATIC:   Lab Results   Component Value Date    ALBUMIN 4.0 12/14/2020    PROTTOTAL 6.7 12/14/2020    ALT 25 12/14/2020    AST 18 12/14/2020    ALKPHOS 60 12/14/2020    BILITOTAL 0.9 12/14/2020     OTHER:   Lab Results   Component Value Date    RENE 8.9 12/10/2021    MAG 2.2 05/19/2004       Anesthesia Plan    ASA Status:  3   NPO Status:  NPO Appropriate    Anesthesia Type: Spinal.      Maintenance: TIVA.        Consents    Anesthesia Plan(s) and associated risks, benefits, and realistic alternatives discussed. Questions answered and patient/representative(s) expressed understanding.     - Discussed: Risks, Benefits and Alternatives for BOTH SEDATION and the PROCEDURE were discussed     - Discussed with:  Patient      - Extended Intubation/Ventilatory Support Discussed: No.      - Patient is DNR/DNI Status: No    Use of blood products discussed: Yes.     - Discussed with: Patient.     - Consented: consented to blood products            Reason for refusal: other.     Postoperative Care    Pain management: IV analgesics, Oral pain  medications, Multi-modal analgesia.   PONV prophylaxis: Dexamethasone or Solumedrol, Ondansetron (or other 5HT-3)     Comments:    Other Comments: Saphenous nerve block for POP per surgeon request.  Decadron, Zofran.  Diprivan infusion.  Ketamine (0.5 mg/kg).  Magnesium.  Ephedrine, PE infusion PRN.            Ashvin Pineda MD

## 2021-12-13 NOTE — PHARMACY-ADMISSION MEDICATION HISTORY
Pharmacy Note - Admission Medication History    Pertinent Provider Information:    ______________________________________________________________________    Prior To Admission (PTA) med list completed and updated in EMR.       PTA Med List   Medication Sig Note Last Dose     apixaban ANTICOAGULANT (ELIQUIS ANTICOAGULANT) 5 MG tablet Take 1 tablet (5 mg) by mouth 2 times daily 12/13/2021: To start after surgery      aspirin 81 MG EC tablet [ASPIRIN 81 MG EC TABLET] Take 1 tablet (81 mg total) by mouth daily.  12/12/2021 at Unknown time     atorvastatin (LIPITOR) 40 MG tablet TAKE ONE TABLET BY MOUTH ONCE DAILY  12/12/2021 at Takes at bedtime     carvedilol (COREG) 12.5 MG tablet TAKE ONE TABLET BY MOUTH TWICE A DAY WITH MEALS  12/13/2021 at 0600     lisinopril (ZESTRIL) 10 MG tablet TAKE 1 TABLET (10 MG TOTAL) BY MOUTH DAILY.  12/12/2021 at Takes at bedtime     loratadine (CLARITIN) 10 mg tablet [LORATADINE (CLARITIN) 10 MG TABLET] Take 10 mg by mouth daily as needed.          Information source(s): Patient, Clinic records and [facility]    Method of interview communication: in-person    Patient was asked about OTC/herbal products specifically.  PTA med list reflects this.    Based on the pharmacist's assessment, the PTA med list information appears reliable    Allergies were reviewed, assessed, and updated with the patient.      Patient does not use any multi-dose medications prior to admission.     Thank you for the opportunity to participate in the care of this patient.      Krystle Rosario RPH     12/13/2021     10:11 AM

## 2021-12-14 ENCOUNTER — APPOINTMENT (OUTPATIENT)
Dept: PHYSICAL THERAPY | Facility: CLINIC | Age: 71
End: 2021-12-14
Attending: ORTHOPAEDIC SURGERY
Payer: COMMERCIAL

## 2021-12-14 ENCOUNTER — APPOINTMENT (OUTPATIENT)
Dept: OCCUPATIONAL THERAPY | Facility: CLINIC | Age: 71
End: 2021-12-14
Attending: ORTHOPAEDIC SURGERY
Payer: COMMERCIAL

## 2021-12-14 VITALS
RESPIRATION RATE: 18 BRPM | WEIGHT: 209.6 LBS | HEART RATE: 62 BPM | BODY MASS INDEX: 26.9 KG/M2 | OXYGEN SATURATION: 95 % | SYSTOLIC BLOOD PRESSURE: 111 MMHG | DIASTOLIC BLOOD PRESSURE: 64 MMHG | TEMPERATURE: 97.9 F | HEIGHT: 74 IN

## 2021-12-14 LAB
ALBUMIN SERPL-MCNC: 3.2 G/DL (ref 3.5–5)
ALP SERPL-CCNC: 52 U/L (ref 45–120)
ALT SERPL W P-5'-P-CCNC: 19 U/L (ref 0–45)
ANION GAP SERPL CALCULATED.3IONS-SCNC: 6 MMOL/L (ref 5–18)
AST SERPL W P-5'-P-CCNC: 16 U/L (ref 0–40)
BASOPHILS # BLD AUTO: 0 10E3/UL (ref 0–0.2)
BASOPHILS NFR BLD AUTO: 0 %
BILIRUB SERPL-MCNC: 0.7 MG/DL (ref 0–1)
BUN SERPL-MCNC: 20 MG/DL (ref 8–22)
CALCIUM SERPL-MCNC: 8.3 MG/DL (ref 8.5–10.5)
CHLORIDE BLD-SCNC: 107 MMOL/L (ref 98–107)
CO2 SERPL-SCNC: 26 MMOL/L (ref 22–31)
CREAT SERPL-MCNC: 0.96 MG/DL (ref 0.7–1.3)
EOSINOPHIL # BLD AUTO: 0 10E3/UL (ref 0–0.7)
EOSINOPHIL NFR BLD AUTO: 0 %
ERYTHROCYTE [DISTWIDTH] IN BLOOD BY AUTOMATED COUNT: 12.9 % (ref 10–15)
FASTING STATUS PATIENT QL REPORTED: ABNORMAL
GFR SERPL CREATININE-BSD FRML MDRD: 80 ML/MIN/1.73M2
GLUCOSE BLD-MCNC: 137 MG/DL (ref 70–125)
GLUCOSE BLD-MCNC: 137 MG/DL (ref 70–125)
HCT VFR BLD AUTO: 42 % (ref 40–53)
HGB BLD-MCNC: 14.5 G/DL (ref 13.3–17.7)
IMM GRANULOCYTES # BLD: 0.1 10E3/UL
IMM GRANULOCYTES NFR BLD: 1 %
LYMPHOCYTES # BLD AUTO: 0.7 10E3/UL (ref 0.8–5.3)
LYMPHOCYTES NFR BLD AUTO: 4 %
MAGNESIUM SERPL-MCNC: 2.4 MG/DL (ref 1.8–2.6)
MCH RBC QN AUTO: 30 PG (ref 26.5–33)
MCHC RBC AUTO-ENTMCNC: 34.5 G/DL (ref 31.5–36.5)
MCV RBC AUTO: 87 FL (ref 78–100)
MONOCYTES # BLD AUTO: 1.8 10E3/UL (ref 0–1.3)
MONOCYTES NFR BLD AUTO: 9 %
NEUTROPHILS # BLD AUTO: 17.7 10E3/UL (ref 1.6–8.3)
NEUTROPHILS NFR BLD AUTO: 86 %
NRBC # BLD AUTO: 0 10E3/UL
NRBC BLD AUTO-RTO: 0 /100
PLATELET # BLD AUTO: 201 10E3/UL (ref 150–450)
POTASSIUM BLD-SCNC: 4.6 MMOL/L (ref 3.5–5)
PROT SERPL-MCNC: 5.9 G/DL (ref 6–8)
RBC # BLD AUTO: 4.84 10E6/UL (ref 4.4–5.9)
SODIUM SERPL-SCNC: 139 MMOL/L (ref 136–145)
WBC # BLD AUTO: 20.4 10E3/UL (ref 4–11)

## 2021-12-14 PROCEDURE — 97166 OT EVAL MOD COMPLEX 45 MIN: CPT | Mod: GO

## 2021-12-14 PROCEDURE — 99207 PR CDG-CODE CATEGORY CHANGED: CPT | Performed by: INTERNAL MEDICINE

## 2021-12-14 PROCEDURE — 97535 SELF CARE MNGMENT TRAINING: CPT | Mod: GO

## 2021-12-14 PROCEDURE — 97116 GAIT TRAINING THERAPY: CPT | Mod: GP

## 2021-12-14 PROCEDURE — 250N000013 HC RX MED GY IP 250 OP 250 PS 637: Performed by: PHYSICIAN ASSISTANT

## 2021-12-14 PROCEDURE — 85004 AUTOMATED DIFF WBC COUNT: CPT | Performed by: INTERNAL MEDICINE

## 2021-12-14 PROCEDURE — 82947 ASSAY GLUCOSE BLOOD QUANT: CPT | Performed by: ORTHOPAEDIC SURGERY

## 2021-12-14 PROCEDURE — 97110 THERAPEUTIC EXERCISES: CPT | Mod: GP

## 2021-12-14 PROCEDURE — 83735 ASSAY OF MAGNESIUM: CPT | Performed by: INTERNAL MEDICINE

## 2021-12-14 PROCEDURE — 36415 COLL VENOUS BLD VENIPUNCTURE: CPT | Performed by: INTERNAL MEDICINE

## 2021-12-14 PROCEDURE — 250N000013 HC RX MED GY IP 250 OP 250 PS 637: Performed by: INTERNAL MEDICINE

## 2021-12-14 PROCEDURE — 82040 ASSAY OF SERUM ALBUMIN: CPT | Performed by: INTERNAL MEDICINE

## 2021-12-14 PROCEDURE — 250N000009 HC RX 250: Performed by: EMERGENCY MEDICINE

## 2021-12-14 PROCEDURE — 99213 OFFICE O/P EST LOW 20 MIN: CPT | Performed by: INTERNAL MEDICINE

## 2021-12-14 PROCEDURE — 97161 PT EVAL LOW COMPLEX 20 MIN: CPT | Mod: GP

## 2021-12-14 PROCEDURE — 250N000011 HC RX IP 250 OP 636: Performed by: PHYSICIAN ASSISTANT

## 2021-12-14 RX ORDER — GINSENG 100 MG
CAPSULE ORAL 3 TIMES DAILY
Qty: 14 G | Refills: 0 | Status: SHIPPED | OUTPATIENT
Start: 2021-12-14 | End: 2022-03-02

## 2021-12-14 RX ORDER — GINSENG 100 MG
CAPSULE ORAL 3 TIMES DAILY
Status: DISCONTINUED | OUTPATIENT
Start: 2021-12-14 | End: 2021-12-14 | Stop reason: HOSPADM

## 2021-12-14 RX ADMIN — SENNOSIDES AND DOCUSATE SODIUM 1 TABLET: 50; 8.6 TABLET ORAL at 09:11

## 2021-12-14 RX ADMIN — APIXABAN 5 MG: 5 TABLET, FILM COATED ORAL at 00:39

## 2021-12-14 RX ADMIN — ACETAMINOPHEN 975 MG: 325 TABLET ORAL at 00:39

## 2021-12-14 RX ADMIN — CARVEDILOL 12.5 MG: 6.25 TABLET, FILM COATED ORAL at 09:10

## 2021-12-14 RX ADMIN — POLYETHYLENE GLYCOL 3350 17 G: 17 POWDER, FOR SOLUTION ORAL at 09:11

## 2021-12-14 RX ADMIN — APIXABAN 5 MG: 5 TABLET, FILM COATED ORAL at 09:11

## 2021-12-14 RX ADMIN — ACETAMINOPHEN 975 MG: 325 TABLET ORAL at 09:10

## 2021-12-14 RX ADMIN — CEFAZOLIN SODIUM 2 G: 2 INJECTION, SOLUTION INTRAVENOUS at 02:19

## 2021-12-14 RX ADMIN — BACITRACIN: 500 OINTMENT TOPICAL at 10:33

## 2021-12-14 NOTE — CONSULTS
[facility] MEDICINE CONSULTATION NOTE       Assessment & Plan      1. Recent cardiac MR showed -- Small area mural thrombus involving the distal anteroseptal and apical septal wall segments.  - Cardiology recommended to start after surgery, RN to ask Orthopedics when safe to start  - Patient has not been on Eliquis, and likely be on this medication for 3 months or as directed by cardiology  - He was made aware of bleeding complications while on AC. He was anxious about no thinners, as he was worried of having a stroke given thrombus.    2. History of CAD/stent, most recent EF was 40% --- No chest pain    3. Has HTN and HL     4. Has a morse inserted given post-op urinary retention - morse bag with no evidence of gross hematuria. He has history of BPH     5. Post-op right TKA  EBL of 50 ml  - check CBC abd BMP        VTE prophylaxis: Per ortho  Diet:  Cardiac  GI prophylaxis: Not indicated at this point, re-assess if needed.   Pain, sleep, and vomiting medications: Per order set  Code Status: Full   COVID test result:  negative yesterday   COVID vaccination: completed   Barriers to discharge: admitting clinical condition  Discharge Disposition and goals:  Unable to determine at this point, pending clinical progress and response to treatment. Patient may need transfer to SNF or ACR if unsafe to go home and needed treatment inappropriate at home setting OR may need home health care evaluation if care can be delivered at home settings. Consider referral to care manager/    PPE - I was wearing PPE when I met the patient - N95 mask, Surgical mask, Isolation gown, Gloves, Safety glasses.      Care plan was created based on available information provided, including patient's condition at the time of encounter.   This plan was discussed with patient and/or family members using layman's terms and have agreed to proceed.   At the end of night shift (9PM - 730A), this case will be  presented to the AM Hospitalist.    It is recommended to revise care plan if there is change in condition and/or new clinical information that are not available during my encounter.     All or some of home medication/s were not resumed on admission due to safety reasons or contraindications. Dosing and frequency may also have been modified. Please resume/review them during your visit.     70 minutes of total visit duration and greater than 50% was spent in direct evaluation of patient and coordination of care including discussion of diagnostic test results and recommended treatment. .      Law Hooper MD, MPH, FACP, [facility]  Internal Medicine - Hospitalist        Chief Complaint Elective knee surgery      HISTORY     - Met him in his room and he was awake and interactive. No complaints.  - He came in for elective right TKA. He had 50 ml EBL. No issues until post-op he had some urinary retention and Uro was called. He has a morse and draining yellow urine, no gross hematuria.  - He denies CP or SOB. No abdominal pain. No leg pain.   - He had a recent cardiac work-up and was found to have Small area mural thrombus involving the distal anteroseptal and apical septal wall segments. He was advised to start Eliquis after surgery when cleared by Orthopedics.     - ROS --- No headache. No dizziness. No weakness. No CP or SOB. No palpitations. No abdominal pain. No nausea or vomiting. No urinary symptoms. No bleeding symptoms. No weight loss. Rest of 12 point ROS was reviewed and negative.       Past Medical History     Past Medical History:   Diagnosis Date     Antiplatelet or antithrombotic long-term use      Arthritis      BPH (benign prostatic hyperplasia)      Coronary artery disease      Diverticulosis      Hyperlipidemia      Hypertension      Ischemic cardiomyopathy      Myocardial infarction (H) 2004     Prostate hyperplasia, benign localized, without urinary obstruction      Squamous cell carcinoma of skin       Stented coronary artery          Surgical History     Past Surgical History:   Procedure Laterality Date     CORONARY STENT PLACEMENT       CYSTOSCOPY PROSTATE W/ LASER N/A 11/29/2016    Procedure: CYSTOSCOPY WITH TRANSURETHRAL RESECTION OF THE PROSTATE WITH QUANTA LASER;  Surgeon: Tre Cuadra MD;  Location: [facility] - [place], [place];  Service:         Family History      Family History   Problem Relation Age of Onset     Heart Disease Mother         coronary stents     Benign prostatic hyperplasia Father      Heart Disease Father      Kidney Cancer Father      Benign prostatic hyperplasia Paternal Uncle      Benign prostatic hyperplasia Paternal Grandfather          Social History      .  Social History     Socioeconomic History     Marital status:      Spouse name: Not on file     Number of children: Not on file     Years of education: Not on file     Highest education level: Not on file   Occupational History     Not on file   Tobacco Use     Smoking status: Never Smoker     Smokeless tobacco: Never Used   Substance and Sexual Activity     Alcohol use: Yes     Comment: Alcoholic Drinks/day: occasionally     Drug use: No     Sexual activity: Not on file   Other Topics Concern     Not on file   Social History Narrative     Not on file     Social Determinants of Health     Financial Resource Strain: Not on file   Food Insecurity: Not on file   Transportation Needs: Not on file   Physical Activity: Not on file   Stress: Not on file   Social Connections: Not on file   Intimate Partner Violence: Not on file   Housing Stability: Not on file          Allergies      No Known Allergies      Prior to Admission Medications      No current facility-administered medications on file prior to encounter.  aspirin 81 MG EC tablet, [ASPIRIN 81 MG EC TABLET] Take 1 tablet (81 mg total) by mouth daily.  carvedilol (COREG) 12.5 MG tablet, TAKE ONE TABLET BY MOUTH TWICE A DAY WITH MEALS  loratadine (CLARITIN) 10 mg tablet,  "[LORATADINE (CLARITIN) 10 MG TABLET] Take 10 mg by mouth daily as needed.             Review of Systems     A 12 point comprehensive review of systems was negative except as noted above in HPI.    PHYSICAL EXAMINATION       Vitals      Vitals: BP (!) 162/80 (BP Location: Right arm)   Pulse 72   Temp 97  F (36.1  C) (Oral)   Resp 17   Ht 1.88 m (6' 2\")   Wt 95.1 kg (209 lb 9.6 oz)   SpO2 94%   BMI 26.91 kg/m    BMI= Body mass index is 26.91 kg/m .      Examination     General Appearance:  Alert, cooperative, no distress  Head:    Normocephalic, without obvious abnormality, atraumatic  EENT:  PERRL, conjunctiva/corneas clear, EOM's intact.   Neck:   Supple, symmetrical, trachea midline, no adenopathy; no NVE  Back:  Symmetric, no curvature, no CVA tenderness  Chest/Lungs: Clear to auscultation bilaterally, respirations unlabored, No tenderness or deformity. No abdominal breathing or use of accessory muscles.   Heart:    Regular rate and rhythm, S1 and S2 normal, no murmur, rub   or gallop  Abdomen: Soft, non-tender, bowel sounds active all four quadrants, not peritoneal on palpation. Not distended  Extremities:  Extremities normal, atraumatic, no swelling   Skin:  Skin color, texture, turgor normal, no rashes or lesion  Neurologic:  Awake and alert,  No lateralizing or localizing signs         Pertinent Lab     Results for orders placed or performed during the hospital encounter of 12/13/21   XR Knee Port Right 1/2 Views    Impression    IMPRESSION: Recent postoperative changes right knee arthroplasty. Alignment is satisfactory. No acute fracture. Postoperative gas in the knee joint and soft tissues. Spurring at the extensor tendon insertions on the patella.           Pertinent Radiology         "

## 2021-12-14 NOTE — PLAN OF CARE
Physical Therapy Discharge Summary    Reason for therapy discharge:    All goals and outcomes met, no further needs identified.    Progress towards therapy goal(s). See goals on Care Plan in [facility] electronic health record for goal details.  Goals met    Therapy recommendation(s):    Continue home exercise program.

## 2021-12-14 NOTE — PROGRESS NOTES
Discharge paperwork addressed thoroughly with pt and wife. Questions answered and addressed at length by writer. AVS sent with pt.    Taylor R Schoenecker, RN

## 2021-12-14 NOTE — PROGRESS NOTES
12/14/21 0825   Quick Adds   Type of Visit Initial Occupational Therapy Evaluation   Living Environment   People in home spouse   Current Living Arrangements house  (split level)   Transportation Anticipated family or friend will provide  (spouse)   Living Environment Comments Raised toilet seat with L vanity. Tub/shower on main level; Walk-in shower downstairs (2 flights). Pt plans to use tub/shower and purchase shower chair.    Self-Care   Usual Activity Tolerance good   Current Activity Tolerance good   Equipment Currently Used at Home raised toilet seat;grab bar, toilet;walker, standard   Activity/Exercise/Self-Care Comment Pt reports indep in all ADLs/IADLs prior to surgery.    Disability/Function   Hearing Difficulty or Deaf no   Wear Glasses or Blind yes   Vision Management glasses   Concentrating, Remembering or Making Decisions Difficulty no   Difficulty Communicating no   Difficulty Eating/Swallowing no   General Information   Onset of Illness/Injury or Date of Surgery 12/13/21   Referring Physician Dr. Aydin Angeles   Patient/Family Therapy Goal Statement (OT) To return home.    Existing Precautions/Restrictions no known precautions/restrictions   Right Lower Extremity (Weight-bearing Status) weight-bearing as tolerated (WBAT)   Cognitive Status Examination   Affect/Mental Status (Cognitive) WFL   Follows Commands WFL   Range of Motion Comprehensive   General Range of Motion bilateral upper extremity ROM WFL   Strength Comprehensive (MMT)   General Manual Muscle Testing (MMT) Assessment no strength deficits identified   Coordination   Upper Extremity Coordination No deficits were identified   Clinical Impression   Criteria for Skilled Therapeutic Interventions Met (OT) yes   OT Diagnosis Decreased indep in ADLs due to R TKA.    OT Problem List-Impairments impacting ADL activity tolerance impaired;mobility   Assessment of Occupational Performance 1-3 Performance Deficits   Identified Performance Deficits  Dressing, toileting, and func mob   Planned Therapy Interventions (OT) ADL retraining   Clinical Decision Making Complexity (OT) moderate complexity   Therapy Frequency (OT) Daily   Predicted Duration of Therapy 1 day    Anticipated Equipment Needs Upon Discharge (OT) other (see comments)  (shower chair)   Risk & Benefits of therapy have been explained patient   OT Discharge Planning    OT Discharge Recommendation (DC Rec) Home with assist   OT Rationale for DC Rec Pt moves within precautions and demonstrates competency in ADLs. Pt's spouse to assist at home if needed.    Total Evaluation Time (Minutes)   Total Evaluation Time (Minutes) 15

## 2021-12-14 NOTE — PLAN OF CARE
Problem: Postoperative Urinary Retention (Knee Arthroplasty)  Goal: Effective Urinary Elimination  Outcome: Improving    Pt has a morse catheter in place. Draining radha color urine. Reports improvement in abdominal discomfort.      Problem: Postoperative Nausea and Vomiting (Knee Arthroplasty)  Goal: Nausea and Vomiting Relief  Outcome: Improving     Had intermittent nausea this shift. PRN zofran given, effective relief.     Problem: Joint Function Impaired (Knee Arthroplasty)  Goal: Optimal Functional Ability  Outcome: Improving    A&Ox4. Lungs sounds clear. Denies SOB and chest pain. Ambulation goal met this shift. Voiding via morse catheter, patent. Tolerating regular diet. CMS intact.     Patient vital signs are at baseline: Yes  Patient able to ambulate as they were prior to admission or with assist devices provided by therapies during their stay:  Yes  Patient MUST void prior to discharge:  Yes  Patient able to tolerate oral intake:  Yes  Pain has adequate pain control using Oral analgesics:  Yes

## 2021-12-14 NOTE — PROGRESS NOTES
[facility]    Medicine Progress Note - Hospitalist Service       Date of Admission:  12/13/2021    Assessment & Plan            Met him in his room and he was awake and interactive. No complaints.  - He came in for elective right TKA. He had 50 ml EBL. No issues until post-op he had some urinary retention and Uro was called. He has a morse and draining yellow urine, no gross hematuria.  - He denies CP or SOB. No abdominal pain. No leg pain.   - He had a recent cardiac work-up and was found to have Small area mural thrombus involving the distal anteroseptal and apical septal wall segments. He was advised to start Eliquis after surgery when cleared by Orthopedics.      - ROS --- No headache. No dizziness. No weakness. No CP or SOB. No palpitations. No abdominal pain. No nausea or vomiting. No urinary symptoms. No bleeding symptoms. No weight loss. Rest of 12 point ROS was reviewed and negative.     1. Recent cardiac MR showed -- Small area mural thrombus involving the distal anteroseptal and apical septal wall segments.  - Cardiology recommended to start after surgery, RN to ask Orthopedics when safe to start  - Patient has not been on Eliquis, and likely be on this medication for 3 months or as directed by cardiology. Further refills per cardiology.  - He was made aware of bleeding complications while on AC. He was anxious about no thinners, as he was worried of having a stroke given thrombus.     2. History of CAD/stent, most recent EF was 40% --- No chest pain     3. Has HTN and HL      4. Has a morse inserted given post-op urinary retention - morse bag with no evidence of gross hematuria. He has history of BPH  PVT in 5-7 days.     5. Post-op right TKA  EBL of 50 ml       Diet: Advance Diet as Tolerated: Regular Diet Adult  Discharge Instruction - Regular Diet Adult    DVT Prophylaxis: DOAC  Morse Catheter: PRESENT, indication: Retention (will stay in 5 days)  Central Lines: None  Code  Status: Full Code      Disposition Plan   Expected Discharge: 12/14/2021     Anticipated discharge location: [facility] care coordination huddle  Delays:          The patient's care was discussed with the Patient.    Aydin Angeles MD  Hospitalist Service  [facility]  Securely message with the Vocera Web Console (learn more here)  Text page via [employer] Paging/Directory        Clinically Significant Risk Factors Present on Admission             # Coagulation Defect: home medication list includes an anticoagulant medication  # Platelet Defect: home medication list includes an antiplatelet medication      ______________________________________________________________________    Interval History     No fevers/chills. No chest pain/SOB.    Data reviewed today: I reviewed all medications, new labs and imaging results over the last 24 hours. I personally reviewed no images or EKG's today.    Physical Exam   Vital Signs: Temp: 97.9  F (36.6  C) Temp src: Oral BP: 111/64 Pulse: 62   Resp: 18 SpO2: 95 % O2 Device: None (Room air) Oxygen Delivery: 2 LPM  Weight: 209 lbs 9.6 oz    Gen - AAO x 3 in NAD.  Lungs - CTA B.  Heart - RR,S1+S2 nml, no m/g/r.  Abd - soft, NT, ND, + BS. + morse.  Ext - no edema.    Data   Recent Labs   Lab 12/10/21  1124   WBC 5.5   HGB 16.1   MCV 88      INR 1.06      POTASSIUM 4.5   CHLORIDE 105   CO2 24   BUN 16   CR 0.78   ANIONGAP 9   RENE 8.9        Recent Results (from the past 24 hour(s))   XR Knee Port Right 1/2 Views    Narrative    EXAM: XR KNEE PORT RIGHT 1/2 VIEWS  LOCATION: [facility]  DATE/TIME: 12/13/2021 12:26 PM    INDICATION: Post-Op Total Knee  COMPARISON: 5/18/2021      Impression    IMPRESSION: Recent postoperative changes right knee arthroplasty. Alignment is satisfactory. No acute fracture. Postoperative gas in the knee joint and soft tissues. Spurring at the extensor tendon insertions on the patella.      Medications     lactated ringers Stopped (12/14/21 0642)       acetaminophen  975 mg Oral Q8H     apixaban ANTICOAGULANT  5 mg Oral BID     atorvastatin  40 mg Oral At Bedtime     bacitracin   Topical TID     carvedilol  12.5 mg Oral BID w/meals     lisinopril  10 mg Oral Daily     polyethylene glycol  17 g Oral Daily     senna-docusate  1 tablet Oral BID     sodium chloride (PF)  3 mL Intracatheter Q8H

## 2021-12-14 NOTE — PLAN OF CARE
Patient vital signs are at baseline: Yes  Patient able to ambulate as they were prior to admission or with assist devices provided by therapies during their stay:  Yes  Patient MUST void prior to discharge:  No,  Reason:  pt will discharge with a morse and do a voiding trial in 5-7days outpt  Patient able to tolerate oral intake:  Yes  Pain has adequate pain control using Oral analgesics:  Yes     Plan to discharge home today.

## 2021-12-14 NOTE — PROGRESS NOTES
Place of Service:  [facility]     Reason for follow up: difficult catheter placement, post operative urinary retention     SUBJECTIVE:  Events:    Patient reports minimal catheter discomfort.discussed bacitracin to tip of penis. Urine yellow in tubing, light pink tinges in bag..    OBJECTIVE:  PHYSICAL EXAM:  Temp: 97.9  F (36.6  C) Temp src: Oral BP: 111/64 Pulse: 62   Resp: 18 SpO2: 95 % O2 Device: None (Room air) Oxygen Delivery: 2 LPM  General: NAD, alert, cooperative  Head: normocephalic, without abnormality / atraumatic  Abdomen: soft, non tender, non distended.   Genitourinary: 18f catheter in place. Urine in tubing and bag   Skin: No rashes or lesions  Musculoskeletal: moves all four extremities equally; no calf edema or tenderness  Psychological: alert and oriented, answers questions appropriately    ASSESSMENT/PLAN:  Harrison LOVE Floyd is being seen by [facility] for difficult morse catheter placement after post operative urinary retention.     - Maintain morse catheter at discharge. RN to educate patient on cathter cares and leg bag. Order placed.  - Bacitracin TID to tip of penis for morse catheter irritation  - Office information in discharge navigator. Office staff will contact patient for voiding trial next week.  -[facility] will sign off. Please re-consult with any new or worsening urologic concerns.        Bhakti Roberson PA-C  [facility]   [phone]

## 2021-12-14 NOTE — DISCHARGE SUMMARY
ORTHOPEDIC DISCHARGE SUMMARY       Harrison Barrientos,  1950, MRN [id number]    Admission Date: 2021  Admission Diagnoses: Osteoarthritis of right knee [M17.11]  Arthritis of left knee [M17.12]     Discharge Date:  2021     Post-operative Day:  1 Day Post-Op    Reason for Admission: The patient was admitted for the following:  Procedure(s):  RIGHT TOTAL KNEE ARTHROPLASTY      BRIEF HOSPITAL COURSE   This 71 year old male underwent the aforementioned procedure with Dr. Estrada on 2021. There were no intraoperative complications and the patient was transferred to the recovery room and later the orthopedic unit in stable condition. Once the patient reached the orthopedic floor our orthopedic pain protocol was implemented along with the following:    Anticoagulation Medications: Eliquis  Therapy: PT and OT  Activity: WBAT  Bracing: None    Consultations during Admission: Hospitalist service for medical management     COMPLICATIONS/SIGNIFICANT FINDINGS    None.    DISCHARGE INFORMATION   Condition at discharge: Good  Discharge destination: Home  Patient was seen by myself on the date of discharge.    FOLLOW UP CARE   Follow up with orthopedics in 2 weeks or sooner should the need arise. Ortho will continue to manage pain control, post op anticoagulation and incision care.     Follow up with your PCP for management of chronic medical problems and to evaluate for post op medical complications including constipation, nausea/vomiting, DVT/PE, anemia, changes in blood pressure, fevers/chills, urinary retention and atelectasis/pneumonia.     Subjective   Patient is doing well today. Patient has passed his therapies. He is using Tylenol and Oxycodone for pain which he is tolerating well. He has been unable to void and a Espana was placed. He will follow up outpatient with urology.  He feels ready to discharge home. No other complaints. All questions answered.     Physical Exam   The patient is  "A&Ox3.  Sensation is intact.  Dorsiflexion and plantar flexion is intact.  Dorsalis pedis pulse intact.  Calves are soft and non-tender.   The incision is covered. Dressing C/D/I    /64 (BP Location: Right arm)   Pulse 62   Temp 97.9  F (36.6  C) (Oral)   Resp 18   Ht 1.88 m (6' 2\")   Wt 95.1 kg (209 lb 9.6 oz)   SpO2 95%   BMI 26.91 kg/m      Pertinent Results at Discharge     Hemoglobin   Date/Time Value Ref Range Status   12/14/2021 10:30 AM 14.5 13.3 - 17.7 g/dL Final   12/10/2021 11:24 AM 16.1 13.3 - 17.7 g/dL Final   12/14/2020 03:58 PM 16.5 14.0 - 18.0 g/dL Final   05/19/2004 03:25 PM 14.9 13.3 - 17.7 g/dL Final     INR   Date/Time Value Ref Range Status   12/10/2021 11:24 AM 1.06 0.85 - 1.15 Final   05/19/2004 03:25 PM 1.12 0.86 - 1.14 Final     Platelet Count   Date/Time Value Ref Range Status   12/14/2021 10:30  150 - 450 10e3/uL Final   12/10/2021 11:24  150 - 450 10e3/uL Final   12/14/2020 03:58  140 - 440 thou/uL Final   05/19/2004 03:25  150 - 450 10e9/L Final       Problem List   Active Problems:    Arthritis of left knee        Antonia Ross PA-C  Date: 12/14/2021  Time: 10:47 AM    "

## 2021-12-14 NOTE — DISCHARGE INSTRUCTIONS
Emptying and Cleaning Your Urinary Catheter Bag  You have an indwelling urinary catheter. This drains urine from your bladder into a bag. The bag can be one that is used at your bedside. Or it can be a smaller bag that is strapped to your leg. Follow the steps below to empty and clean a urinary bag.      Drain       Clean tube       Clean catheter      Step 1. Drain the bag    Wash your hands well with soap and water to prevent infecting the urinary catheter and bag.    If the short drainage tube is inserted into a pocket on the bag, take the drainage tube out of the pocket.    Hold the drainage tube over a toilet or measuring container. Open the valve.    Don t touch the tip of the valve or let it touch the toilet or container.    Wash your hands again.    Step 2. Clean the drainage tube    When the bag is empty, clean the tip of the drainage valve with an alcohol wipe.    Close the valve.    Reinsert the drainage tube into the pocket, if there is one.    Step 3. Clean your skin    Wash your hands well before and after cleaning your skin.    If you have a catheter (such as a Espana) that enters through the urethra, clean the urethral area with soap and water  1 time(s)  daily as you were taught by your healthcare provider. You should also clean after every bowel movement to prevent infection.  ? Don't pull on the tubing when cleaning so you don t injure the urethra.  ? Don t apply antibiotic ointment or any other antibacterial product to the urethra.  ? Don t use lubricant on the urethra.  ? Don t apply powder to the genital area or to the tubing.    If you have a suprapubic catheter, your healthcare provider will tell you how to clean your skin around the catheter. This is a catheter that was surgically placed into the bladder through the lower belly (abdomen).    Step 4. Check and clean the catheter tubing    Check the tubing. If there are kinks, cracks, clogs, or you can t see into the tubing, you ll need to  change to new tubing as you were shown by your healthcare provider.    If the current tubing can still be used, wash it with soap and water. Always wash the tubing in the direction away from your body. Don't pull on the tubing.    Dry the tubing with a clean washcloth or paper towel.    Step 5. Clean the drainage bag    Have a clean backup bag or other drainage device ready.    Follow these steps:  ? Wash your hands well with soap and water.  ? Disconnect the bag from the catheter tubing. Connect the tubing to the backup bag or drainage device.  ? Drain any remaining urine from the bag you just disconnected. Close the drainage valve.  ? Pour some warm (not hot) soapy water into the bag. Swish the soap around, being sure to get the corners of the bag.  ? Open the drainage valve to drain the soap. Close the valve.  ? Use a certain solution to clean the bag if your healthcare provider advises one. Solutions that may be advised include:      2 parts  vinegar and  3 parts water.    1 tablespoon of chlorine bleach mixed with a 1/2 cup of water.  ? Ask your healthcare provider how often you should clean your bag. Ask what solution you should use to reduce odor and keep your bag free of germs.  ? Shake the solution a bit and let it remain in the bag for 30 minutes.  ? Drain the solution and rinse the bag with cold tap water.  ? Hang the bag to drain and air-dry.    When to call your healthcare provider  Call your healthcare provider right away if you have any of the following:    Little or no urine flowing into the bag    Urine leaking where the catheter enters the body    Pain, burning, or redness of the area where the catheter enters the body    Bloody urine (a trace of blood is normal)    Cloudy or foul-smelling urine, or sand-like grains in your urine    Pain in your lower back or lower belly (abdomen)    Your catheter falls out    Fever of  100.4  F ( 38 C ) or higher, or as directed by your provider    Shaking  [employer] last reviewed this educational content on 10/1/2019    [id number] [employer]. All rights reserved. This information is not intended as a substitute for professional medical care. Always follow your healthcare professional's instructions.        Discharge Instructions: Caring for Your Indwelling Urinary Catheter  You have been discharged with an indwelling urinary catheter (also called a Espana catheter ). A catheter is a thin, flexible tube. An indwelling urinary catheter has two parts. The first part is a tube that drains urine from your bladder. The second part is a bag or other device that collects the urine.   The most important thing to remember is that you want to prevent infection. Always wash your hands before handling your catheter bag or tubing.   Draining the bedside bag    Wash your hands with soap and clean, running water. Or use an alcohol-based hand  that contains at least 60% alcohol.    Hold the drainage tube over a toilet or measuring container.    Unclamp the tube and let the bag drain.    Don t touch the tip of the drainage tube or let it touch the toilet or container.    You don't need to rinse the bag or drainage tube.    Cleaning the drainage tube    When the bag is empty, clean the tip of the drainage tube with an alcohol wipe.    Clamp the tube.    Reinsert the tube into the pocket on the drainage bag.    Cleaning your skin and tubing    Clean the skin near the catheter with soap and water.    Wash your genital area from front to back.    Wash the catheter tubing. Always wash the catheter in the direction away from your body.    You will be told when and how to change your bag and tubing.    Don t try to remove the catheter by yourself.    You may shower with the catheter in place.    Emptying a leg bag    Wash your hands.    Remove the stopper on the bag.    Drain the bag into the toilet or a measuring container. Don t let the tip of the drainage tube  touch anything, including your fingers.    Clean the tip of the drainage tube with alcohol.    Replace the stopper.    Follow-up care  Make a follow-up appointment, or as directed by your healthcare provider   When to call your healthcare provider  Call your healthcare provider right away if you have any of the following:    Fever of 100.4 F ( 38 C) or higher, or as directed by your provider    Chills    Leakage around the catheter insertion site    Increased spasms (uncontrollable twitching) in your legs, belly (abdomen), or bladder. Occasional mild spasms are normal.    Burning in the urinary tract, penis, or genital area    Nausea and vomiting    Aching in the lower back    Cloudy or bloody (pink or red) urine, sediment or mucus in the urine, or bad-smelling urine  [employer] last reviewed this educational content on 12/1/2019 2000-2021 [employer]. All rights reserved. This information is not intended as a substitute for professional medical care. Always follow your healthcare professional's instructions.

## 2021-12-14 NOTE — CONSULTS
SW reviewed pt chart and OT recs for Home with assist and PT recs for OP PT. Pt baseline indpt with ADLs. No anticipated CM discharge needs.     TAZ Danielle

## 2021-12-14 NOTE — PLAN OF CARE
Occupational Therapy Discharge Summary    Reason for therapy discharge:    All goals and outcomes met, no further needs identified.    Progress towards therapy goal(s). See goals on Care Plan in [facility] electronic health record for goal details.  Goals met    Therapy recommendation(s):    No further therapy is recommended.

## 2021-12-28 ENCOUNTER — TRANSFERRED RECORDS (OUTPATIENT)
Dept: HEALTH INFORMATION MANAGEMENT | Facility: CLINIC | Age: 71
End: 2021-12-28
Payer: COMMERCIAL

## 2022-01-11 ENCOUNTER — TRANSFERRED RECORDS (OUTPATIENT)
Dept: HEALTH INFORMATION MANAGEMENT | Facility: CLINIC | Age: 72
End: 2022-01-11
Payer: COMMERCIAL

## 2022-01-26 ENCOUNTER — TRANSFERRED RECORDS (OUTPATIENT)
Dept: HEALTH INFORMATION MANAGEMENT | Facility: CLINIC | Age: 72
End: 2022-01-26
Payer: COMMERCIAL

## 2022-02-23 ENCOUNTER — TRANSFERRED RECORDS (OUTPATIENT)
Dept: HEALTH INFORMATION MANAGEMENT | Facility: CLINIC | Age: 72
End: 2022-02-23
Payer: COMMERCIAL

## 2022-02-27 ENCOUNTER — HEALTH MAINTENANCE LETTER (OUTPATIENT)
Age: 72
End: 2022-02-27

## 2022-03-01 ENCOUNTER — TELEPHONE (OUTPATIENT)
Dept: FAMILY MEDICINE | Facility: CLINIC | Age: 72
End: 2022-03-01
Payer: COMMERCIAL

## 2022-03-01 NOTE — TELEPHONE ENCOUNTER
Reason for Call:  Other appointment    Detailed comments: patient called and would like to schedule a pre op physical with Yuli Tesfaye at [facility] this week.  Procedure on March 7 for right knee replacement.  Please contact patient today.  Thank you.    Phone Number Patient can be reached at: Home number on file [phone] (home)    Best Time: any    Can we leave a detailed message on this number? YES    Call taken on 3/1/2022 at 10:15 AM by Colette Matta

## 2022-03-02 ENCOUNTER — OFFICE VISIT (OUTPATIENT)
Dept: INTERNAL MEDICINE | Facility: CLINIC | Age: 72
End: 2022-03-02
Payer: COMMERCIAL

## 2022-03-02 VITALS
SYSTOLIC BLOOD PRESSURE: 112 MMHG | RESPIRATION RATE: 16 BRPM | HEART RATE: 65 BPM | DIASTOLIC BLOOD PRESSURE: 70 MMHG | WEIGHT: 218 LBS | TEMPERATURE: 98 F | OXYGEN SATURATION: 97 % | HEIGHT: 74 IN | BODY MASS INDEX: 27.98 KG/M2

## 2022-03-02 DIAGNOSIS — M24.561 FLEXION CONTRACTURE OF RIGHT KNEE: ICD-10-CM

## 2022-03-02 DIAGNOSIS — Z01.818 PRE-OPERATIVE GENERAL PHYSICAL EXAMINATION: Primary | ICD-10-CM

## 2022-03-02 DIAGNOSIS — I25.10 ATHEROSCLEROSIS OF NATIVE CORONARY ARTERY OF NATIVE HEART WITHOUT ANGINA PECTORIS: ICD-10-CM

## 2022-03-02 DIAGNOSIS — I25.5 ISCHEMIC CARDIOMYOPATHY: ICD-10-CM

## 2022-03-02 LAB
ANION GAP SERPL CALCULATED.3IONS-SCNC: 10 MMOL/L (ref 5–18)
BUN SERPL-MCNC: 17 MG/DL (ref 8–22)
CALCIUM SERPL-MCNC: 9.6 MG/DL (ref 8.5–10.5)
CHLORIDE BLD-SCNC: 104 MMOL/L (ref 98–107)
CO2 SERPL-SCNC: 28 MMOL/L (ref 22–31)
CREAT SERPL-MCNC: 0.8 MG/DL (ref 0.7–1.3)
ERYTHROCYTE [DISTWIDTH] IN BLOOD BY AUTOMATED COUNT: 13 % (ref 10–15)
GFR SERPL CREATININE-BSD FRML MDRD: >90 ML/MIN/1.73M2
GLUCOSE BLD-MCNC: 93 MG/DL (ref 70–125)
HCT VFR BLD AUTO: 45.3 % (ref 40–53)
HGB BLD-MCNC: 14.5 G/DL (ref 13.3–17.7)
MCH RBC QN AUTO: 28.9 PG (ref 26.5–33)
MCHC RBC AUTO-ENTMCNC: 32 G/DL (ref 31.5–36.5)
MCV RBC AUTO: 90 FL (ref 78–100)
PLATELET # BLD AUTO: 248 10E3/UL (ref 150–450)
POTASSIUM BLD-SCNC: 4.4 MMOL/L (ref 3.5–5)
RBC # BLD AUTO: 5.02 10E6/UL (ref 4.4–5.9)
SODIUM SERPL-SCNC: 142 MMOL/L (ref 136–145)
WBC # BLD AUTO: 5.9 10E3/UL (ref 4–11)

## 2022-03-02 PROCEDURE — 36415 COLL VENOUS BLD VENIPUNCTURE: CPT | Performed by: NURSE PRACTITIONER

## 2022-03-02 PROCEDURE — U0003 INFECTIOUS AGENT DETECTION BY NUCLEIC ACID (DNA OR RNA); SEVERE ACUTE RESPIRATORY SYNDROME CORONAVIRUS 2 (SARS-COV-2) (CORONAVIRUS DISEASE [COVID-19]), AMPLIFIED PROBE TECHNIQUE, MAKING USE OF HIGH THROUGHPUT TECHNOLOGIES AS DESCRIBED BY CMS-2020-01-R: HCPCS | Performed by: NURSE PRACTITIONER

## 2022-03-02 PROCEDURE — 85027 COMPLETE CBC AUTOMATED: CPT | Performed by: NURSE PRACTITIONER

## 2022-03-02 PROCEDURE — 80048 BASIC METABOLIC PNL TOTAL CA: CPT | Performed by: NURSE PRACTITIONER

## 2022-03-02 PROCEDURE — 99214 OFFICE O/P EST MOD 30 MIN: CPT | Performed by: NURSE PRACTITIONER

## 2022-03-02 PROCEDURE — U0005 INFEC AGEN DETEC AMPLI PROBE: HCPCS | Performed by: NURSE PRACTITIONER

## 2022-03-02 NOTE — PROGRESS NOTES
[address] [address]  Phone: [phone]  Fax: [phone]  Primary Provider: Brad Roldan  Pre-op Performing Provider: ALLY BERNARD      PREOPERATIVE EVALUATION:  Today's date: 3/2/2022    Harrison Barrientos is a 71 year old male who presents for a preoperative evaluation.    Surgical Information:  Surgery/Procedure: Knee manipulation under anesthesia right knee  Surgery Location: [facility] ([facility])  Surgeon:   Surgery Date: 3/7/22  Time of Surgery: 11:45am  Where patient plans to recover: At home with family   Fax: [facility] center and [facility]      Type of Anesthesia Anticipated: Local with MAC    Assessment & Plan     The proposed surgical procedure is considered LOW risk.    Problem List Items Addressed This Visit        Circulatory    Coronary Artery Disease    Ischemic cardiomyopathy      Other Visit Diagnoses     Pre-operative general physical examination    -  Primary    Relevant Orders    Asymptomatic COVID-19 Virus (Coronavirus) by PCR (Completed)    CBC with platelets (Completed)    Basic metabolic panel (Completed)    Flexion contracture of right knee        Relevant Orders    Asymptomatic COVID-19 Virus (Coronavirus) by PCR (Completed)    CBC with platelets (Completed)    Basic metabolic panel (Completed)            Medication Instructions:  Patient is to take all scheduled medications on the day of surgery    RECOMMENDATION:  APPROVAL GIVEN to proceed with proposed procedure, without further diagnostic evaluation.        Subjective     HPI related to upcoming procedure: Harrison Barrientos is a 71-year-old male who presents to the office today for preoperative physical.  He has chronic right knee pain and underwent knee arthroplasty in December.  He has poor range of motion and is scheduled to undergo knee manipulation under anesthesia.    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  He has not yet followed up with cardiology regarding this but intends to do so.    He has a history of cardiovascular disease and had a prior myocardial infarction in 2004 with stent stent placement in the LAD.  He also has ischemic cardiomyopathy, November 2021 cardiac MRI showed LVEF of 40.9%.  He denies any chest pain or shortness of breath.  He has mild lower extremity edema, worse in the left lower extremity.    Preop Questions 3/2/2022   1. Have you ever had a heart attack or stroke? YES - MI at age 54 with stent    2. Have you ever had surgery on your heart or blood vessels, such as a stent placement, a coronary artery bypass, or surgery on an artery in your head, neck, heart, or legs? YES - cardiac stent    3. Do you have chest pain with activity? No   4. Do you have a history of  heart failure? No   5. Do you currently have a cold, bronchitis or symptoms of other infection? No   6. Do you have a cough, shortness of breath, or wheezing? No   7. Do you or anyone in your family have previous history of blood clots? No   8. Do you or does anyone in your family have a serious bleeding problem such as prolonged bleeding following surgeries or cuts? No   9. Have you ever had problems with anemia or been told to take iron pills? No   10. Have you had any abnormal blood loss such as black, tarry or bloody stools? No   11. Have you ever had a blood transfusion? No   12. Are you willing to have a blood transfusion if it is medically needed before, during, or after your surgery? Yes   13. Have you or any of your relatives ever had problems with anesthesia? No   14. Do you have sleep apnea, excessive snoring or daytime drowsiness? No   15. Do you have any artifical heart valves or other implanted medical devices like a pacemaker, defibrillator, or continuous glucose monitor? No    16. Do you have artificial joints? YES - right knee   17. Are you allergic to latex? No       Health Care Directive:  Patient does not have a Health Care Directive or Living Will: Discussed advance care planning with patient; information given to patient to review.    Preoperative Review of :   reviewed - controlled substances reflected in medication list.  He is no longer taking a controlled substance, this was only prescribed after his knee arthroplasty in December.      ROS:  A 10-point ROS is negative except as stated in HPI       Patient Active Problem List    Diagnosis Date Noted     Arthritis of left knee 12/13/2021     Priority: Medium     Adenomatous polyp of sigmoid colon 11/09/2019     Priority: Medium     Ischemic cardiomyopathy      Priority: Medium     Created by Conversion         Hypercholesterolemia      Priority: Medium     Created by Conversion         BPH (benign prostatic hyperplasia)      Priority: Medium     Created by Conversion         Diverticulosis      Priority: Medium     Created by Conversion  Replacement Utility updated for latest IMO load         Coronary Artery Disease      Priority: Medium     Created by Conversion  Replacement Utility updated for latest IMO load         Benign Prostatic Hypertrophy With Urinary Obstruction      Priority: Medium     Created by Conversion         Squamous Cell Carcinoma Of The Skin      Priority: Medium     Created by Conversion         Joint Pain, Localized In The Shoulder      Priority: Medium     Created by Conversion         Fatigue      Priority: Medium     Created by Conversion         Neck Pain      Priority: Medium     Created by Conversion         Serology Prostate-specific Antigen (PSA) Elevated      Priority: Medium     Created by Conversion          Past Medical History:   Diagnosis Date     Antiplatelet or antithrombotic long-term use      Arthritis      BPH (benign prostatic hyperplasia)      Coronary artery disease       Diverticulosis      Hyperlipidemia      Hypertension      Ischemic cardiomyopathy      Myocardial infarction (H) 2004     Prostate hyperplasia, benign localized, without urinary obstruction      Squamous cell carcinoma of skin      Stented coronary artery      Past Surgical History:   Procedure Laterality Date     ARTHROPLASTY KNEE Right 12/13/2021    Procedure: RIGHT TOTAL KNEE ARTHROPLASTY;  Surgeon: Magdi Estrada MD;  Location: [facility]     CORONARY STENT PLACEMENT       CYSTOSCOPY PROSTATE W/ LASER N/A 11/29/2016    Procedure: CYSTOSCOPY WITH TRANSURETHRAL RESECTION OF THE PROSTATE WITH QUANTA LASER;  Surgeon: Tre Cuadra MD;  [place] [facility]  Service:      Current Outpatient Medications   Medication Sig Dispense Refill     acetaminophen (TYLENOL) 325 MG tablet Take 2 tablets (650 mg) by mouth every 4 hours as needed for other (mild pain) 100 tablet 0     apixaban ANTICOAGULANT (ELIQUIS ANTICOAGULANT) 5 MG tablet Take 1 tablet (5 mg) by mouth 2 times daily 180 tablet 0     atorvastatin (LIPITOR) 40 MG tablet TAKE ONE TABLET BY MOUTH ONCE DAILY 90 tablet 3     carvedilol (COREG) 12.5 MG tablet TAKE ONE TABLET BY MOUTH TWICE A DAY WITH MEALS 180 tablet 1     lisinopril (ZESTRIL) 10 MG tablet TAKE 1 TABLET (10 MG TOTAL) BY MOUTH DAILY. 90 tablet 1     loratadine (CLARITIN) 10 mg tablet [LORATADINE (CLARITIN) 10 MG TABLET] Take 10 mg by mouth daily as needed.        senna-docusate (SENOKOT-S/PERICOLACE) 8.6-50 MG tablet Take 1-2 tablets by mouth 2 times daily Take while on oral narcotics to prevent or treat constipation. 30 tablet 0       No Known Allergies     Social History     Tobacco Use     Smoking status: Never Smoker     Smokeless tobacco: Never Used   Substance Use Topics     Alcohol use: Yes     Comment: Alcoholic Drinks/day: occasionally     Family History   Problem Relation Age of Onset     Heart Disease Mother         coronary stents     Benign prostatic hyperplasia  "Father      Heart Disease Father      Kidney Cancer Father      Benign prostatic hyperplasia Paternal Uncle      Benign prostatic hyperplasia Paternal Grandfather      History   Drug Use No         Objective     /70 (BP Location: Right arm, Patient Position: Sitting, Cuff Size: Adult Regular)   Pulse 65   Temp 98  F (36.7  C) (Temporal)   Resp 16   Ht 1.88 m (6' 2\")   Wt 98.9 kg (218 lb)   SpO2 97%   BMI 27.99 kg/m      Physical Exam    GENERAL APPEARANCE: healthy, alert, and no distress     EYES: EOMI     HENT: ear canals and TM's normal and nose and mouth without ulcers or lesions     NECK: no adenopathy, no asymmetry, masses     RESP: lungs clear to auscultation - no rales, rhonchi or wheezes     CV: regular rates and rhythm, normal S1 S2, no S3 or S4 and no murmur, click or rub. Trace left LE edema      ABDOMEN:  soft, nontender, no HSM or masses and bowel sounds normal     MS: antalgic gait favoring right knee with limited right knee ROM. No warmth/erythema      NEURO: Normal strength and tone, sensory exam grossly normal, mentation intact and speech normal     PSYCH: mentation appears normal. and affect normal/bright     LYMPHATICS: No cervical adenopathy    Recent Labs   Lab Test 12/14/21  1030 12/10/21  1124   HGB 14.5 16.1    208   INR  --  1.06    138   POTASSIUM 4.6 4.5   CR 0.96 0.78        Diagnostics:  Recent Results (from the past 168 hour(s))   CBC with platelets    Collection Time: 03/02/22  4:21 PM   Result Value Ref Range    WBC Count 5.9 4.0 - 11.0 10e3/uL    RBC Count 5.02 4.40 - 5.90 10e6/uL    Hemoglobin 14.5 13.3 - 17.7 g/dL    Hematocrit 45.3 40.0 - 53.0 %    MCV 90 78 - 100 fL    MCH 28.9 26.5 - 33.0 pg    MCHC 32.0 31.5 - 36.5 g/dL    RDW 13.0 10.0 - 15.0 %    Platelet Count 248 150 - 450 10e3/uL   Basic metabolic panel    Collection Time: 03/02/22  4:21 PM   Result Value Ref Range    Sodium 142 136 - 145 mmol/L    Potassium 4.4 3.5 - 5.0 mmol/L    Chloride 104 98 " - 107 mmol/L    Carbon Dioxide (CO2) 28 22 - 31 mmol/L    Anion Gap 10 5 - 18 mmol/L    Urea Nitrogen 17 8 - 22 mg/dL    Creatinine 0.80 0.70 - 1.30 mg/dL    Calcium 9.6 8.5 - 10.5 mg/dL    Glucose 93 70 - 125 mg/dL    GFR Estimate >90 >60 mL/min/1.73m2   Asymptomatic COVID-19 Virus (Coronavirus) by PCR Nose    Collection Time: 03/02/22  4:21 PM    Specimen: Nose; Swab   Result Value Ref Range    SARS CoV2 PCR Negative Negative, Testing sent to reference lab. Results will be returned via unsolicited result          Revised Cardiac Risk Index (RCRI):  The patient has the following serious cardiovascular risks for perioperative complications:   - Coronary Artery Disease (MI, positive stress test, angina, Qs on EKG) = 1 point     RCRI Interpretation: 1 point: Class II (low risk - 0.9% complication rate)           Signed Electronically by: Corine Nascimento NP  Copy of this evaluation report is provided to requesting physician.

## 2022-03-03 ENCOUNTER — TRANSFERRED RECORDS (OUTPATIENT)
Dept: HEALTH INFORMATION MANAGEMENT | Facility: CLINIC | Age: 72
End: 2022-03-03
Payer: COMMERCIAL

## 2022-03-03 LAB — SARS-COV-2 RNA RESP QL NAA+PROBE: NEGATIVE

## 2022-03-03 NOTE — TELEPHONE ENCOUNTER
I reviewed. There is no flexibility earlier in the week. I was going to work patient in tomorrow but he has been seen.

## 2022-03-07 ENCOUNTER — TRANSFERRED RECORDS (OUTPATIENT)
Dept: HEALTH INFORMATION MANAGEMENT | Facility: CLINIC | Age: 72
End: 2022-03-07
Payer: COMMERCIAL

## 2022-03-07 DIAGNOSIS — I51.3 MURAL THROMBUS OF HEART: ICD-10-CM

## 2022-03-08 NOTE — TELEPHONE ENCOUNTER
Routing refill request to provider for review/approval because:  Started in December after surgery, will send to provider to review.     Last Written Prescription Date:  12/10/21  Last Fill Quantity: 180,  # refills: 0   Last office visit provider:  3/2/21     Requested Prescriptions   Pending Prescriptions Disp Refills     ELIQUIS ANTICOAGULANT 5 MG tablet [Pharmacy Med Name: ELIQUIS 5MG TABS] 180 tablet 0     Sig: TAKE ONE TABLET BY MOUTH TWICE A DAY       Direct Oral Anticoagulant Agents Passed - 3/7/2022  5:01 AM        Passed - Normal Platelets on file in past 12 months     Recent Labs   Lab Test 03/02/22  1621                  Passed - Medication is active on med list        Passed - Patient is 18-79 years of age        Passed - Serum creatinine less than or equal to 1.4 on file in past 12 mos     Recent Labs   Lab Test 03/02/22  1621   CR 0.80       Ok to refill medication if creatinine is low          Passed - Weight is greater than 60 kg for the past year     Wt Readings from Last 3 Encounters:   03/02/22 98.9 kg (218 lb)   12/13/21 95.1 kg (209 lb 9.6 oz)   12/10/21 97.3 kg (214 lb 6.4 oz)             Passed - Recent (6 mo) or future (30 days) visit within the authorizing provider's specialty             Anita Eaton RN 03/08/22 12:22 PM

## 2022-03-09 ENCOUNTER — TELEPHONE (OUTPATIENT)
Dept: CARDIOLOGY | Facility: CLINIC | Age: 72
End: 2022-03-09
Payer: COMMERCIAL

## 2022-03-09 RX ORDER — APIXABAN 5 MG/1
TABLET, FILM COATED ORAL
Qty: 180 TABLET | Refills: 0 | Status: SHIPPED | OUTPATIENT
Start: 2022-03-09 | End: 2022-05-31

## 2022-03-09 NOTE — TELEPHONE ENCOUNTER
[facility] Call Center    Phone Message    May a detailed message be left on voicemail: yes     Reason for Call: Other: Pt would like a call back to discuss if he needs to stay on Eliquis or if he needs to come back in for a f/u to discus staying or stopping the eliquis     Action Taken: Message routed to:  [facility] ([facility]): Cardio    Travel Screening: Not Applicable

## 2022-03-09 NOTE — TELEPHONE ENCOUNTER
Return msg sent to sched with request to call patient back and arrange follow-up per recall (refer to 12-13-21 phone note for follow-up recommendations to address duration of Eliquis).  mg

## 2022-03-22 ENCOUNTER — TRANSFERRED RECORDS (OUTPATIENT)
Dept: HEALTH INFORMATION MANAGEMENT | Facility: CLINIC | Age: 72
End: 2022-03-22
Payer: COMMERCIAL

## 2022-03-29 ENCOUNTER — OFFICE VISIT (OUTPATIENT)
Dept: CARDIOLOGY | Facility: CLINIC | Age: 72
End: 2022-03-29
Attending: INTERNAL MEDICINE
Payer: COMMERCIAL

## 2022-03-29 VITALS
HEIGHT: 74 IN | OXYGEN SATURATION: 94 % | BODY MASS INDEX: 27.87 KG/M2 | SYSTOLIC BLOOD PRESSURE: 118 MMHG | RESPIRATION RATE: 17 BRPM | WEIGHT: 217.2 LBS | HEART RATE: 64 BPM | DIASTOLIC BLOOD PRESSURE: 68 MMHG

## 2022-03-29 DIAGNOSIS — E78.00 HYPERCHOLESTEROLEMIA: ICD-10-CM

## 2022-03-29 DIAGNOSIS — I25.5 ISCHEMIC CARDIOMYOPATHY: ICD-10-CM

## 2022-03-29 PROCEDURE — 99214 OFFICE O/P EST MOD 30 MIN: CPT | Performed by: INTERNAL MEDICINE

## 2022-03-29 NOTE — LETTER
3/29/2022    Brad Roldan MD  [address]  [facility] [address]    RE: Harrison Barrientos       Dear Colleague,     I had the pleasure of seeing Harrison Barrientos in the [facility].    HEART CARE ENCOUNTER CONSULTATON NOTE      [facility]  [phone]      Assessment/Recommendations   Assessment:  1.  Coronary artery disease: Status post anterior myocardial infarction in 2004.   No anginal complaints.   2.  Hypertension: Well-controlled today  3.  Dyslipidemia: Recent lipids reviewed and well controlled  4.  Ischemic cardiomyopathy: Left ventricular ejection fraction 40%  5.  Intracardiac thrombus: Small mural thrombus noted on cardiac MRI in December 2021 now on Eliquis    Plan:  1.  We will repeat cardiac MRI on Eliquis now that he is 3 months out from treatment to reevaluate thrombus  2.  Continue on high-dose statin therapy, carvedilol and lisinopril  Follow-up in a year       History of Present Illness/Subjective    HPI: Harrison Barrientos is a 71 year old male with history of ischemic cardiomyopathy with left ventricular ejection fraction of 40% status post anterior MI in 2004 here for follow-up.    He underwent cardiac MRI back in December which suggested a small mural thrombus with LVEF of 41%, akinesis of apex, distal anteroseptum and distal anterior wall segment.  He was started on Eliquis 5 mg twice daily.  He underwent right knee replacement surgery.  He had postop complications with significant swelling and difficulty with immobility.  He is now undergoing aggressive physical therapy which has been helping.  Denies any issues with breathing, chest pain.  He has chronic lower extremity edema has remained unchanged.    Cardiac MRI 12/2/2021   ==========================================================================================================     1.  Normal left ventricular size with moderate reduction in left ventricular systolic function. The  quantified  "left ventricular ejection fraction is 40.9%. There is akinesis of the entire apex, distal  anteroseptum and distal anterior wall segement.  Severe hypokinesis of the anteroseptal wall and mid  anterior wall segments.  2.  Non-transmural myocardial infarction involving the basal to mid anteroseptal and mid anterior wall  involving 50% of myocardium representing possibly viable myocardium.  Transmural myocardial infarction  involving distal anteroseptal, apical anterior wall and entire apex of the left ventricle representing  nonviable myocardium.  T1 inferoseptal wall: 1044 ms (normal).   T1 anteroseptal wall:1289 ms (abnormal).   3. Small area mural thrombus involving the distal anteroseptal and apical septal wall segments.  4.  Normal right ventricular size and systolic function. RVEF: 61.5%   5.  There is mild aortic regurgitation.   6.  Mild ascending aortic enlargement at 41 mm.  Mild aortic root enlargement at 41.5 mm           Physical Examination  Review of Systems   Vitals: /68 (BP Location: Left arm, Patient Position: Sitting, Cuff Size: Adult Large)   Pulse 64   Resp 17   Ht 1.88 m (6' 2\")   Wt 98.5 kg (217 lb 3.2 oz)   SpO2 94%   BMI 27.89 kg/m    BMI= Body mass index is 27.89 kg/m .  Wt Readings from Last 3 Encounters:   03/29/22 98.5 kg (217 lb 3.2 oz)   03/02/22 98.9 kg (218 lb)   12/13/21 95.1 kg (209 lb 9.6 oz)       General Appearance:   no distress, normal body habitus   ENT/Mouth: membranes moist, no oral lesions or bleeding gums.      EYES:  no scleral icterus, normal conjunctivae   Neck: no carotid bruits or thyromegaly   Chest/Lungs:   lungs are clear to auscultation   Cardiovascular:   Regular. Normal first and second heart sounds with no murmurs  mild edema bilaterally    Abdomen:  no organomegaly, masses, bruits, or tenderness; bowel sounds are present   Extremities: no cyanosis or clubbing   Skin: no xanthelasma, warm.    Neurologic: normal  bilateral, no tremors   "   Psychiatric: alert and oriented x3, calm        Please refer above for cardiac ROS details.        Medical History  Surgical History Family History Social History   Past Medical History:   Diagnosis Date     Antiplatelet or antithrombotic long-term use      Arthritis      BPH (benign prostatic hyperplasia)      Coronary artery disease      Diverticulosis      Hyperlipidemia      Hypertension      Ischemic cardiomyopathy      Myocardial infarction (H) 2004     Prostate hyperplasia, benign localized, without urinary obstruction      Squamous cell carcinoma of skin      Stented coronary artery      Past Surgical History:   Procedure Laterality Date     ARTHROPLASTY KNEE Right 12/13/2021    Procedure: RIGHT TOTAL KNEE ARTHROPLASTY;  Surgeon: Magdi Estrada MD;  Location: [facility]     CORONARY STENT PLACEMENT       CYSTOSCOPY PROSTATE W/ LASER N/A 11/29/2016    Procedure: CYSTOSCOPY WITH TRANSURETHRAL RESECTION OF THE PROSTATE WITH QUANTA LASER;  Surgeon: Tre Cuadra MD;  [facility];  Service:      Family History   Problem Relation Age of Onset     Heart Disease Mother         coronary stents     Benign prostatic hyperplasia Father      Heart Disease Father      Kidney Cancer Father      Benign prostatic hyperplasia Paternal Uncle      Benign prostatic hyperplasia Paternal Grandfather         Social History     Socioeconomic History     Marital status:      Spouse name: Not on file     Number of children: Not on file     Years of education: Not on file     Highest education level: Not on file   Occupational History     Not on file   Tobacco Use     Smoking status: Never Smoker     Smokeless tobacco: Never Used   Substance and Sexual Activity     Alcohol use: Yes     Comment: Alcoholic Drinks/day: occasionally     Drug use: No     Sexual activity: Not on file   Other Topics Concern     Not on file   Social History Narrative     Not on file     Social Determinants of Health      Financial Resource Strain: Not on file   Food Insecurity: Not on file   Transportation Needs: Not on file   Physical Activity: Not on file   Stress: Not on file   Social Connections: Not on file   Intimate Partner Violence: Not on file   Housing Stability: Not on file           Medications  Allergies   Current Outpatient Medications   Medication Sig Dispense Refill     acetaminophen (TYLENOL) 325 MG tablet Take 2 tablets (650 mg) by mouth every 4 hours as needed for other (mild pain) 100 tablet 0     atorvastatin (LIPITOR) 40 MG tablet TAKE ONE TABLET BY MOUTH ONCE DAILY 90 tablet 3     carvedilol (COREG) 12.5 MG tablet TAKE ONE TABLET BY MOUTH TWICE A DAY WITH MEALS 180 tablet 1     ELIQUIS ANTICOAGULANT 5 MG tablet TAKE ONE TABLET BY MOUTH TWICE A  tablet 0     lisinopril (ZESTRIL) 10 MG tablet TAKE 1 TABLET (10 MG TOTAL) BY MOUTH DAILY. 90 tablet 1     loratadine (CLARITIN) 10 mg tablet [LORATADINE (CLARITIN) 10 MG TABLET] Take 10 mg by mouth daily as needed.        senna-docusate (SENOKOT-S/PERICOLACE) 8.6-50 MG tablet Take 1-2 tablets by mouth 2 times daily Take while on oral narcotics to prevent or treat constipation. 30 tablet 0     No Known Allergies       Lab Results    Chemistry/lipid CBC Cardiac Enzymes/BNP/TSH/INR   Recent Labs   Lab Test 12/14/20  1558   CHOL 96   HDL 33*   LDL 49   TRIG 70     Recent Labs   Lab Test 12/14/20  1558 03/22/19  1140 11/09/17  1408   LDL 49 52 56     Recent Labs   Lab Test 03/02/22  1621      POTASSIUM 4.4   CHLORIDE 104   CO2 28   GLC 93   BUN 17   CR 0.80   GFRESTIMATED >90   RENE 9.6     Recent Labs   Lab Test 03/02/22  1621 12/14/21  1030 12/10/21  1124   CR 0.80 0.96 0.78     No results for input(s): A1C in the last 96948 hours.       Recent Labs   Lab Test 03/02/22  1621   WBC 5.9   HGB 14.5   HCT 45.3   MCV 90        Recent Labs   Lab Test 03/02/22  1621 12/14/21  1030 12/10/21  1124   HGB 14.5 14.5 16.1    No results for input(s): TROPONINI in  the last 36731 hours.  No results for input(s): BNP, NTBNPI, NTBNP in the last 22110 hours.  No results for input(s): TSH in the last 88697 hours.  Recent Labs   Lab Test 12/10/21  1124   INR 1.06        Marline Ohara MD    Thank you for allowing me to participate in the care of your patient.      Sincerely,     Marline Ohara MD   [facility]      cc:   Marline Ohara MD  [address]  [place] [address]

## 2022-03-29 NOTE — PROGRESS NOTES
HEART CARE ENCOUNTER CONSULTATON NOTE      [facility]  [phone]      Assessment/Recommendations   Assessment:  1.  Coronary artery disease: Status post anterior myocardial infarction in 2004.   No anginal complaints.   2.  Hypertension: Well-controlled today  3.  Dyslipidemia: Recent lipids reviewed and well controlled  4.  Ischemic cardiomyopathy: Left ventricular ejection fraction 40%  5.  Intracardiac thrombus: Small mural thrombus noted on cardiac MRI in December 2021 now on Eliquis    Plan:  1.  We will repeat cardiac MRI on Eliquis now that he is 3 months out from treatment to reevaluate thrombus  2.  Continue on high-dose statin therapy, carvedilol and lisinopril  Follow-up in a year       History of Present Illness/Subjective    HPI: Harrison Barrientos is a 71 year old male with history of ischemic cardiomyopathy with left ventricular ejection fraction of 40% status post anterior MI in 2004 here for follow-up.    He underwent cardiac MRI back in December which suggested a small mural thrombus with LVEF of 41%, akinesis of apex, distal anteroseptum and distal anterior wall segment.  He was started on Eliquis 5 mg twice daily.  He underwent right knee replacement surgery.  He had postop complications with significant swelling and difficulty with immobility.  He is now undergoing aggressive physical therapy which has been helping.  Denies any issues with breathing, chest pain.  He has chronic lower extremity edema has remained unchanged.    Cardiac MRI 12/2/2021   ==========================================================================================================     1.  Normal left ventricular size with moderate reduction in left ventricular systolic function. The  quantified left ventricular ejection fraction is 40.9%. There is akinesis of the entire apex, distal  anteroseptum and distal anterior wall segement.  Severe hypokinesis of the anteroseptal wall and mid  anterior wall  "segments.  2.  Non-transmural myocardial infarction involving the basal to mid anteroseptal and mid anterior wall  involving 50% of myocardium representing possibly viable myocardium.  Transmural myocardial infarction  involving distal anteroseptal, apical anterior wall and entire apex of the left ventricle representing  nonviable myocardium.  T1 inferoseptal wall: 1044 ms (normal).   T1 anteroseptal wall:1289 ms (abnormal).   3. Small area mural thrombus involving the distal anteroseptal and apical septal wall segments.  4.  Normal right ventricular size and systolic function. RVEF: 61.5%   5.  There is mild aortic regurgitation.   6.  Mild ascending aortic enlargement at 41 mm.  Mild aortic root enlargement at 41.5 mm           Physical Examination  Review of Systems   Vitals: /68 (BP Location: Left arm, Patient Position: Sitting, Cuff Size: Adult Large)   Pulse 64   Resp 17   Ht 1.88 m (6' 2\")   Wt 98.5 kg (217 lb 3.2 oz)   SpO2 94%   BMI 27.89 kg/m    BMI= Body mass index is 27.89 kg/m .  Wt Readings from Last 3 Encounters:   03/29/22 98.5 kg (217 lb 3.2 oz)   03/02/22 98.9 kg (218 lb)   12/13/21 95.1 kg (209 lb 9.6 oz)       General Appearance:   no distress, normal body habitus   ENT/Mouth: membranes moist, no oral lesions or bleeding gums.      EYES:  no scleral icterus, normal conjunctivae   Neck: no carotid bruits or thyromegaly   Chest/Lungs:   lungs are clear to auscultation   Cardiovascular:   Regular. Normal first and second heart sounds with no murmurs  mild edema bilaterally    Abdomen:  no organomegaly, masses, bruits, or tenderness; bowel sounds are present   Extremities: no cyanosis or clubbing   Skin: no xanthelasma, warm.    Neurologic: normal  bilateral, no tremors     Psychiatric: alert and oriented x3, calm        Please refer above for cardiac ROS details.        Medical History  Surgical History Family History Social History   Past Medical History:   Diagnosis Date     " Antiplatelet or antithrombotic long-term use      Arthritis      BPH (benign prostatic hyperplasia)      Coronary artery disease      Diverticulosis      Hyperlipidemia      Hypertension      Ischemic cardiomyopathy      Myocardial infarction (H) 2004     Prostate hyperplasia, benign localized, without urinary obstruction      Squamous cell carcinoma of skin      Stented coronary artery      Past Surgical History:   Procedure Laterality Date     ARTHROPLASTY KNEE Right 12/13/2021    Procedure: RIGHT TOTAL KNEE ARTHROPLASTY;  Surgeon: Magdi Estrada MD;  Location: [facility]     CORONARY STENT PLACEMENT       CYSTOSCOPY PROSTATE W/ LASER N/A 11/29/2016    Procedure: CYSTOSCOPY WITH TRANSURETHRAL RESECTION OF THE PROSTATE WITH QUANTA LASER;  Surgeon: Tre Cuadra MD;  Location: [facility] [place] [place];  Service:      Family History   Problem Relation Age of Onset     Heart Disease Mother         coronary stents     Benign prostatic hyperplasia Father      Heart Disease Father      Kidney Cancer Father      Benign prostatic hyperplasia Paternal Uncle      Benign prostatic hyperplasia Paternal Grandfather         Social History     Socioeconomic History     Marital status:      Spouse name: Not on file     Number of children: Not on file     Years of education: Not on file     Highest education level: Not on file   Occupational History     Not on file   Tobacco Use     Smoking status: Never Smoker     Smokeless tobacco: Never Used   Substance and Sexual Activity     Alcohol use: Yes     Comment: Alcoholic Drinks/day: occasionally     Drug use: No     Sexual activity: Not on file   Other Topics Concern     Not on file   Social History Narrative     Not on file     Social Determinants of Health     Financial Resource Strain: Not on file   Food Insecurity: Not on file   Transportation Needs: Not on file   Physical Activity: Not on file   Stress: Not on file   Social Connections: Not on file   Intimate  Partner Violence: Not on file   Housing Stability: Not on file           Medications  Allergies   Current Outpatient Medications   Medication Sig Dispense Refill     acetaminophen (TYLENOL) 325 MG tablet Take 2 tablets (650 mg) by mouth every 4 hours as needed for other (mild pain) 100 tablet 0     atorvastatin (LIPITOR) 40 MG tablet TAKE ONE TABLET BY MOUTH ONCE DAILY 90 tablet 3     carvedilol (COREG) 12.5 MG tablet TAKE ONE TABLET BY MOUTH TWICE A DAY WITH MEALS 180 tablet 1     ELIQUIS ANTICOAGULANT 5 MG tablet TAKE ONE TABLET BY MOUTH TWICE A  tablet 0     lisinopril (ZESTRIL) 10 MG tablet TAKE 1 TABLET (10 MG TOTAL) BY MOUTH DAILY. 90 tablet 1     loratadine (CLARITIN) 10 mg tablet [LORATADINE (CLARITIN) 10 MG TABLET] Take 10 mg by mouth daily as needed.        senna-docusate (SENOKOT-S/PERICOLACE) 8.6-50 MG tablet Take 1-2 tablets by mouth 2 times daily Take while on oral narcotics to prevent or treat constipation. 30 tablet 0     No Known Allergies       Lab Results    Chemistry/lipid CBC Cardiac Enzymes/BNP/TSH/INR   Recent Labs   Lab Test 12/14/20  1558   CHOL 96   HDL 33*   LDL 49   TRIG 70     Recent Labs   Lab Test 12/14/20  1558 03/22/19  1140 11/09/17  1408   LDL 49 52 56     Recent Labs   Lab Test 03/02/22  1621      POTASSIUM 4.4   CHLORIDE 104   CO2 28   GLC 93   BUN 17   CR 0.80   GFRESTIMATED >90   RENE 9.6     Recent Labs   Lab Test 03/02/22  1621 12/14/21  1030 12/10/21  1124   CR 0.80 0.96 0.78     No results for input(s): A1C in the last 03504 hours.       Recent Labs   Lab Test 03/02/22  1621   WBC 5.9   HGB 14.5   HCT 45.3   MCV 90        Recent Labs   Lab Test 03/02/22  1621 12/14/21  1030 12/10/21  1124   HGB 14.5 14.5 16.1    No results for input(s): TROPONINI in the last 64446 hours.  No results for input(s): BNP, NTBNPI, NTBNP in the last 35913 hours.  No results for input(s): TSH in the last 83141 hours.  Recent Labs   Lab Test 12/10/21  1124   INR 1.06         Marline Ohara MD

## 2022-04-12 ENCOUNTER — TRANSFERRED RECORDS (OUTPATIENT)
Dept: HEALTH INFORMATION MANAGEMENT | Facility: CLINIC | Age: 72
End: 2022-04-12
Payer: COMMERCIAL

## 2022-05-10 ENCOUNTER — TRANSFERRED RECORDS (OUTPATIENT)
Dept: HEALTH INFORMATION MANAGEMENT | Facility: CLINIC | Age: 72
End: 2022-05-10
Payer: COMMERCIAL

## 2022-05-12 ENCOUNTER — HOSPITAL ENCOUNTER (OUTPATIENT)
Dept: MRI IMAGING | Facility: HOSPITAL | Age: 72
Discharge: HOME | End: 2022-05-12
Attending: INTERNAL MEDICINE
Payer: COMMERCIAL

## 2022-05-12 DIAGNOSIS — I25.5 ISCHEMIC CARDIOMYOPATHY: ICD-10-CM

## 2022-05-12 DIAGNOSIS — E78.00 HYPERCHOLESTEROLEMIA: ICD-10-CM

## 2022-05-12 PROCEDURE — 999N000122 MR MYOCARDIUM  OVERREAD

## 2022-05-12 PROCEDURE — 255N000002 HC RX 255 OP 636: Performed by: INTERNAL MEDICINE

## 2022-05-12 PROCEDURE — 75561 CARDIAC MRI FOR MORPH W/DYE: CPT

## 2022-05-12 PROCEDURE — 75561 CARDIAC MRI FOR MORPH W/DYE: CPT | Mod: 26 | Performed by: INTERNAL MEDICINE

## 2022-05-12 PROCEDURE — A9585 GADOBUTROL INJECTION: HCPCS | Performed by: INTERNAL MEDICINE

## 2022-05-12 RX ORDER — GADOBUTROL 604.72 MG/ML
17 INJECTION INTRAVENOUS ONCE
Status: COMPLETED | OUTPATIENT
Start: 2022-05-12 | End: 2022-05-12

## 2022-05-12 RX ADMIN — GADOBUTROL 10 ML: 604.72 INJECTION INTRAVENOUS at 16:31

## 2022-05-31 DIAGNOSIS — I51.3 MURAL THROMBUS OF HEART: ICD-10-CM

## 2022-05-31 RX ORDER — APIXABAN 5 MG/1
TABLET, FILM COATED ORAL
Qty: 180 TABLET | Refills: 1 | Status: SHIPPED | OUTPATIENT
Start: 2022-05-31 | End: 2022-12-16

## 2022-06-01 NOTE — TELEPHONE ENCOUNTER
Last Written Prescription Date:  3/9/22  Last Fill Quantity: 180,  # refills: 0   Last office visit provider:  3/2/22     Requested Prescriptions   Pending Prescriptions Disp Refills     ELIQUIS ANTICOAGULANT 5 MG tablet [Pharmacy Med Name: ELIQUIS 5MG TABS] 180 tablet 0     Sig: TAKE ONE TABLET BY MOUTH TWICE A DAY       Direct Oral Anticoagulant Agents Passed - 5/31/2022  5:01 AM        Passed - Normal Platelets on file in past 12 months     Recent Labs   Lab Test 03/02/22  1621                  Passed - Medication is active on med list        Passed - Patient is 18-79 years of age        Passed - Serum creatinine less than or equal to 1.4 on file in past 12 mos     Recent Labs   Lab Test 03/02/22  1621   CR 0.80       Ok to refill medication if creatinine is low          Passed - Weight is greater than 60 kg for the past year     Wt Readings from Last 3 Encounters:   03/29/22 98.5 kg (217 lb 3.2 oz)   03/02/22 98.9 kg (218 lb)   12/13/21 95.1 kg (209 lb 9.6 oz)             Passed - Recent (6 mo) or future (30 days) visit within the authorizing provider's specialty             Alize Marino RN 05/31/22 8:20 PM

## 2022-06-17 ENCOUNTER — TRANSFERRED RECORDS (OUTPATIENT)
Dept: HEALTH INFORMATION MANAGEMENT | Facility: CLINIC | Age: 72
End: 2022-06-17

## 2022-06-29 ENCOUNTER — OFFICE VISIT (OUTPATIENT)
Dept: FAMILY MEDICINE | Facility: CLINIC | Age: 72
End: 2022-06-29
Payer: COMMERCIAL

## 2022-06-29 VITALS
HEART RATE: 54 BPM | WEIGHT: 215 LBS | SYSTOLIC BLOOD PRESSURE: 132 MMHG | RESPIRATION RATE: 16 BRPM | DIASTOLIC BLOOD PRESSURE: 70 MMHG | HEIGHT: 73 IN | TEMPERATURE: 98.3 F | BODY MASS INDEX: 28.49 KG/M2

## 2022-06-29 DIAGNOSIS — Z96.651 S/P TOTAL KNEE ARTHROPLASTY, RIGHT: ICD-10-CM

## 2022-06-29 DIAGNOSIS — I42.0 ISCHEMIC CONGESTIVE CARDIOMYOPATHY (H): ICD-10-CM

## 2022-06-29 DIAGNOSIS — Z01.818 PRE-OPERATIVE EXAMINATION: Primary | ICD-10-CM

## 2022-06-29 DIAGNOSIS — I25.5 ISCHEMIC CARDIOMYOPATHY: ICD-10-CM

## 2022-06-29 DIAGNOSIS — I25.5 ISCHEMIC CONGESTIVE CARDIOMYOPATHY (H): ICD-10-CM

## 2022-06-29 DIAGNOSIS — M25.661 KNEE STIFFNESS, RIGHT: ICD-10-CM

## 2022-06-29 DIAGNOSIS — N40.0 BENIGN PROSTATIC HYPERPLASIA, UNSPECIFIED WHETHER LOWER URINARY TRACT SYMPTOMS PRESENT: ICD-10-CM

## 2022-06-29 LAB
ANION GAP SERPL CALCULATED.3IONS-SCNC: 11 MMOL/L (ref 7–15)
ATRIAL RATE - MUSE: 55 BPM
BUN SERPL-MCNC: 18.2 MG/DL (ref 8–23)
CALCIUM SERPL-MCNC: 9.6 MG/DL (ref 8.8–10.2)
CHLORIDE SERPL-SCNC: 106 MMOL/L (ref 98–107)
CREAT SERPL-MCNC: 0.94 MG/DL (ref 0.67–1.17)
DEPRECATED HCO3 PLAS-SCNC: 25 MMOL/L (ref 22–29)
DIASTOLIC BLOOD PRESSURE - MUSE: NORMAL MMHG
ERYTHROCYTE [DISTWIDTH] IN BLOOD BY AUTOMATED COUNT: 12.9 % (ref 10–15)
GFR SERPL CREATININE-BSD FRML MDRD: 88 ML/MIN/1.73M2
GLUCOSE SERPL-MCNC: 103 MG/DL (ref 70–99)
HCT VFR BLD AUTO: 45 % (ref 40–53)
HGB BLD-MCNC: 14.8 G/DL (ref 13.3–17.7)
INTERPRETATION ECG - MUSE: NORMAL
MCH RBC QN AUTO: 29.1 PG (ref 26.5–33)
MCHC RBC AUTO-ENTMCNC: 32.9 G/DL (ref 31.5–36.5)
MCV RBC AUTO: 89 FL (ref 78–100)
P AXIS - MUSE: 33 DEGREES
PLATELET # BLD AUTO: 186 10E3/UL (ref 150–450)
POTASSIUM SERPL-SCNC: 4.7 MMOL/L (ref 3.4–5.3)
PR INTERVAL - MUSE: 196 MS
QRS DURATION - MUSE: 126 MS
QT - MUSE: 444 MS
QTC - MUSE: 424 MS
R AXIS - MUSE: -57 DEGREES
RBC # BLD AUTO: 5.08 10E6/UL (ref 4.4–5.9)
SODIUM SERPL-SCNC: 142 MMOL/L (ref 136–145)
SYSTOLIC BLOOD PRESSURE - MUSE: NORMAL MMHG
T AXIS - MUSE: 40 DEGREES
VENTRICULAR RATE- MUSE: 55 BPM
WBC # BLD AUTO: 5.8 10E3/UL (ref 4–11)

## 2022-06-29 PROCEDURE — 36415 COLL VENOUS BLD VENIPUNCTURE: CPT | Performed by: FAMILY MEDICINE

## 2022-06-29 PROCEDURE — 85027 COMPLETE CBC AUTOMATED: CPT | Performed by: FAMILY MEDICINE

## 2022-06-29 PROCEDURE — 93010 ELECTROCARDIOGRAM REPORT: CPT | Performed by: INTERNAL MEDICINE

## 2022-06-29 PROCEDURE — 93005 ELECTROCARDIOGRAM TRACING: CPT | Performed by: FAMILY MEDICINE

## 2022-06-29 PROCEDURE — 80048 BASIC METABOLIC PNL TOTAL CA: CPT | Performed by: FAMILY MEDICINE

## 2022-06-29 PROCEDURE — 99214 OFFICE O/P EST MOD 30 MIN: CPT | Performed by: FAMILY MEDICINE

## 2022-06-29 NOTE — H&P (VIEW-ONLY)
[facility]  [address] [facility] [address]  Phone: [phone]  Fax: [phone]  Primary Provider: Declan Smith  Pre-op Performing Provider: DECLAN SMITH      PREOPERATIVE EVALUATION:  Today's date: 6/29/2022    Harrison Barrientos is a 71 year old male who presents for a preoperative evaluation.    Surgical Information:  Surgery/Procedure: Right Total Knee Arthroplasty Scar Tissue Revision   Surgery Location: [facility]   Surgeon: Dr. Estrada   Surgery Date: 7/6/2022  Time of Surgery: 7:20am   Where patient plans to recover: At home with family  Fax number for surgical facility: Note does not need to be faxed, will be available electronically in Epic.    Type of Anesthesia Anticipated: General    Assessment & Plan     The proposed surgical procedure is considered INTERMEDIATE risk.    Pre-operative examination  Knee stiffness, right  S/P total knee arthroplasty, right    Patient is approved for surgery  EKG reveals sinus bradycardia with a left bundle branch block.  There are no acute changes noted  Sodium is 142 with a potassium of 4.7 and GFR of 88  Hemoglobin is 14.8  Patient will hold Eliquis 3 days prior to his procedure  He may hold lisinopril on the morning of surgery  Recommend resuming Eliquis as soon as possible after surgery when approved by orthopedics  Follow-up as recommended by orthopedics    - EKG 12-lead, tracing only  - Basic metabolic panel  - CBC with platelets  - Basic metabolic panel  - CBC with platelets    Ischemic cardiomyopathy  Ischemic congestive cardiomyopathy (H)    Continue atorvastatin, carvedilol, and lisinopril    Benign prostatic hyperplasia, unspecified whether lower urinary tract symptoms present    Monitor for urinary retention following surgery           Risks and Recommendations:  The patient has the following additional risks and recommendations for perioperative complications:   - No identified additional  risk factors other than previously addressed    Medication Instructions:  Patient is to take all scheduled medications on the day of surgery EXCEPT for modifications listed below:  Eliquis will be held 3 days prior to surgery    RECOMMENDATION:  APPROVAL GIVEN to proceed with proposed procedure, without further diagnostic evaluation.    Review of external notes as documented above     Subjective     HPI related to upcoming procedure:     This is a pleasant 71-year-old male presents to clinic for preoperative evaluation.  He has had right knee stiffness and limitation in range of motion following previous right total knee arthroplasty.  He therefore will have a revision given the scar tissue.  He has tolerated anesthesia previously.    Medical history is notable for coronary artery disease, ischemic cardiomyopathy, benign prostatic hypertrophy with obstructive symptoms, and an elevated PSA as well as hyperlipidemia.       Regarding his cardiovascular history he had a previous myocardial infarction in 2004.  He had a single-vessel coronary artery disease with coronary stent placement in the proximal LAD at the time.  He has followed up with cardiology on a consistent basis for an ischemic cardiomyopathy.    A previous echocardiogram revealed an ejection fraction of 32% with mild mitral regurgitation and mild tricuspid regurgitation.    A follow-up nuclear cardiac stress test was negative for inducible myocardial ischemia in 2020.  There was evidence of a previous transmural myocardial infarction in the mid to apical segments and anteroseptal segment.  There was akinesis in the mid to apical anterior wall and distal anteroseptal segment.       He continues to follow-up with Dr. Ohara, cardiology.  She performed a recent evaluation and approved him for the upcoming surgery.  In the meantime, he did have an abnormal cardiac MRI showing a moderate reduction in the left jugular ejection fraction.  The ejection fraction was  estimated to be 40.9%.  There was an area of nontransmural myocardial infarction.  Also, there was a small area of mural thrombus involving the distal anteroseptal and apical septal wall segments.  He has been treated with Eliquis.  He will hold Eliquis prior to procedure and resume this following his procedure.       Preop Questions 6/24/2022   1. Have you ever had a heart attack or stroke? YES - See chart note   2. Have you ever had surgery on your heart or blood vessels, such as a stent placement, a coronary artery bypass, or surgery on an artery in your head, neck, heart, or legs? YES -  See chart note   3. Do you have chest pain with activity? No   4. Do you have a history of  heart failure? No   5. Do you currently have a cold, bronchitis or symptoms of other infection? No   6. Do you have a cough, shortness of breath, or wheezing? No   7. Do you or anyone in your family have previous history of blood clots? No   8. Do you or does anyone in your family have a serious bleeding problem such as prolonged bleeding following surgeries or cuts? No   9. Have you ever had problems with anemia or been told to take iron pills? No   10. Have you had any abnormal blood loss such as black, tarry or bloody stools? No   11. Have you ever had a blood transfusion? No   12. Are you willing to have a blood transfusion if it is medically needed before, during, or after your surgery? Yes   13. Have you or any of your relatives ever had problems with anesthesia? No   14. Do you have sleep apnea, excessive snoring or daytime drowsiness? No   15. Do you have any artifical heart valves or other implanted medical devices like a pacemaker, defibrillator, or continuous glucose monitor? No   16. Do you have artificial joints? YES - s/p right TKA   17. Are you allergic to latex? No       Health Care Directive:  Patient does not have a Health Care Directive or Living Will: Patient states has Advance Directive and will bring in a copy to  clinic.    Preoperative Review of :   reviewed - no record of controlled substances prescribed.      Status of Chronic Conditions:  See problem list for active medical problems.  Problems all longstanding and stable, except as noted/documented.  See ROS for pertinent symptoms related to these conditions.      Review of Systems  Constitutional, neuro, ENT, endocrine, pulmonary, cardiac, gastrointestinal, genitourinary, musculoskeletal, integument and psychiatric systems are negative, except as otherwise noted.    Patient Active Problem List    Diagnosis Date Noted     Arthritis of left knee 12/13/2021     Priority: Medium     Adenomatous polyp of sigmoid colon 11/09/2019     Priority: Medium     Ischemic cardiomyopathy      Priority: Medium     Created by Conversion         Hypercholesterolemia      Priority: Medium     Created by Conversion         BPH (benign prostatic hyperplasia)      Priority: Medium     Created by Conversion         Diverticulosis      Priority: Medium     Created by Conversion  Replacement Utility updated for latest IMO load         Coronary Artery Disease      Priority: Medium     Created by Conversion  Replacement Utility updated for latest IMO load         Benign Prostatic Hypertrophy With Urinary Obstruction      Priority: Medium     Created by Conversion         Squamous Cell Carcinoma Of The Skin      Priority: Medium     Created by Conversion         Joint Pain, Localized In The Shoulder      Priority: Medium     Created by Conversion         Fatigue      Priority: Medium     Created by Conversion         Neck Pain      Priority: Medium     Created by Conversion         Serology Prostate-specific Antigen (PSA) Elevated      Priority: Medium     Created by Conversion          Past Medical History:   Diagnosis Date     Antiplatelet or antithrombotic long-term use      Arthritis      BPH (benign prostatic hyperplasia)      Coronary artery disease      Diverticulosis       Hyperlipidemia      Hypertension      Ischemic cardiomyopathy      Myocardial infarction (H) 2004     Prostate hyperplasia, benign localized, without urinary obstruction      Squamous cell carcinoma of skin      Stented coronary artery      Past Surgical History:   Procedure Laterality Date     ARTHROPLASTY KNEE Right 12/13/2021    Procedure: RIGHT TOTAL KNEE ARTHROPLASTY;  Surgeon: Magdi Estrada MD;  Location: [facility]     CORONARY STENT PLACEMENT       CYSTOSCOPY PROSTATE W/ LASER N/A 11/29/2016    Procedure: CYSTOSCOPY WITH TRANSURETHRAL RESECTION OF THE PROSTATE WITH QUANTA LASER;  Surgeon: Tre Cuadra MD;  Location: [facility];  Service:      Current Outpatient Medications   Medication Sig Dispense Refill     atorvastatin (LIPITOR) 40 MG tablet TAKE ONE TABLET BY MOUTH ONCE DAILY 90 tablet 3     carvedilol (COREG) 12.5 MG tablet TAKE ONE TABLET BY MOUTH TWICE A DAY WITH MEALS 180 tablet 1     ELIQUIS ANTICOAGULANT 5 MG tablet TAKE ONE TABLET BY MOUTH TWICE A  tablet 1     lisinopril (ZESTRIL) 10 MG tablet TAKE 1 TABLET (10 MG TOTAL) BY MOUTH DAILY. 90 tablet 1     loratadine (CLARITIN) 10 mg tablet [LORATADINE (CLARITIN) 10 MG TABLET] Take 10 mg by mouth daily as needed.        acetaminophen (TYLENOL) 325 MG tablet Take 2 tablets (650 mg) by mouth every 4 hours as needed for other (mild pain) (Patient not taking: Reported on 6/29/2022) 100 tablet 0     senna-docusate (SENOKOT-S/PERICOLACE) 8.6-50 MG tablet Take 1-2 tablets by mouth 2 times daily Take while on oral narcotics to prevent or treat constipation. (Patient not taking: Reported on 6/29/2022) 30 tablet 0       No Known Allergies     Social History     Tobacco Use     Smoking status: Never Smoker     Smokeless tobacco: Never Used   Substance Use Topics     Alcohol use: Yes     Comment: rare     Family History   Problem Relation Age of Onset     Heart Disease Mother         coronary stents     Benign prostatic  "hyperplasia Father      Heart Disease Father      Kidney Cancer Father      Benign prostatic hyperplasia Paternal Uncle      Benign prostatic hyperplasia Paternal Grandfather      History   Drug Use No         Objective     Resp 16   Ht 1.854 m (6' 1\")   Wt 97.5 kg (215 lb)   BMI 28.37 kg/m      Physical Exam    GENERAL APPEARANCE: healthy, alert and no distress     EYES: EOMI,  PERRL     HENT: ear canals and TM's normal and nose and mouth without ulcers or lesions     NECK: no adenopathy, no asymmetry, masses, or scars and thyroid normal to palpation     RESP: lungs clear to auscultation - no rales, rhonchi or wheezes     CV: regular rates and rhythm, normal S1 S2, no S3 or S4 and no murmur, click or rub     ABDOMEN:  soft, nontender, no HSM or masses and bowel sounds normal     MS: extremities normal- no gross deformities noted, no evidence of inflammation in joints, FROM in all extremities.     SKIN: no suspicious lesions or rashes     NEURO: Normal strength and tone, sensory exam grossly normal, mentation intact and speech normal     PSYCH: mentation appears normal. and affect normal/bright     LYMPHATICS: No cervical adenopathy    Recent Labs   Lab Test 03/02/22  1621 12/14/21  1030 12/10/21  1124   HGB 14.5 14.5 16.1    201 208   INR  --   --  1.06    139 138   POTASSIUM 4.4 4.6 4.5   CR 0.80 0.96 0.78        Diagnostics:  Recent Results (from the past 240 hour(s))   EKG 12-lead, tracing only    Collection Time: 06/29/22 11:56 AM   Result Value Ref Range    Systolic Blood Pressure  mmHg    Diastolic Blood Pressure  mmHg    Ventricular Rate 55 BPM    Atrial Rate 55 BPM    OH Interval 196 ms    QRS Duration 126 ms     ms    QTc 424 ms    P Axis 33 degrees    R AXIS -57 degrees    T Axis 40 degrees    Interpretation ECG       Sinus bradycardia  Left bundle branch block  Abnormal ECG  When compared with ECG of 10-DEC-2021 16:35,  No significant change was found  Confirmed by TELMA GUIDO MD " LOC:[facility] ([id number]) on 6/29/2022 4:00:54 PM     Basic metabolic panel    Collection Time: 06/29/22 12:10 PM   Result Value Ref Range    Creatinine 0.94 0.67 - 1.17 mg/dL    Sodium 142 136 - 145 mmol/L    Potassium 4.7 3.4 - 5.3 mmol/L    Urea Nitrogen 18.2 8.0 - 23.0 mg/dL    Chloride 106 98 - 107 mmol/L    Carbon Dioxide (CO2) 25 22 - 29 mmol/L    Anion Gap 11 7 - 15 mmol/L    Glucose 103 (H) 70 - 99 mg/dL    GFR Estimate 88 >60 mL/min/1.73m2    Calcium 9.6 8.8 - 10.2 mg/dL   CBC with platelets    Collection Time: 06/29/22 12:10 PM   Result Value Ref Range    WBC Count 5.8 4.0 - 11.0 10e3/uL    RBC Count 5.08 4.40 - 5.90 10e6/uL    Hemoglobin 14.8 13.3 - 17.7 g/dL    Hematocrit 45.0 40.0 - 53.0 %    MCV 89 78 - 100 fL    MCH 29.1 26.5 - 33.0 pg    MCHC 32.9 31.5 - 36.5 g/dL    RDW 12.9 10.0 - 15.0 %    Platelet Count 186 150 - 450 10e3/uL   Asymptomatic COVID-19 Virus (Coronavirus) by PCR Nose    Collection Time: 07/02/22 11:46 AM    Specimen: Nose; Swab   Result Value Ref Range    SARS CoV2 PCR Negative Negative, Testing sent to reference lab. Results will be returned via unsolicited result      EKG: See repot, unchanged from previous tracings    Revised Cardiac Risk Index (RCRI):  The patient has the following serious cardiovascular risks for perioperative complications:   - Coronary Artery Disease (MI, positive stress test, angina, Qs on EKG) = 1 point     RCRI Interpretation: 1 point: Class II (low risk - 0.9% complication rate)           Signed Electronically by: Brad Roldan MD  Copy of this evaluation report is provided to requesting physician.

## 2022-06-29 NOTE — PATIENT INSTRUCTIONS
Chao    Sharmin Wandaquis 3 days prior to surgery  You will resume this following surgery when approved by orthopedics  Follow-up as recommended by orthopedics  Continue to follow-up with Dr. Ohara as well    Brad Roldan MD    Preparing for Your Surgery  Getting started  A nurse will call you to review your health history and instructions. They will give you an arrival time based on your scheduled surgery time. Please be ready to share:  Your doctor's clinic name and phone number  Your medical, surgical and anesthesia history  A list of allergies and sensitivities  A list of medicines, including herbal treatments and over-the-counter drugs  Whether the patient has a legal guardian (ask how to send us the papers in advance)  Please tell us if you're pregnant--or if there's any chance you might be pregnant. Some surgeries may injure a fetus (unborn baby), so they require a pregnancy test. Surgeries that are safe for a fetus don't always need a test, and you can choose whether to have one.   If you have a child who's having surgery, please ask for a copy of Preparing for Your Child's Surgery.    Preparing for surgery  Within 30 days of surgery: Have a pre-op exam (sometimes called an H&P, or History and Physical). This can be done at a clinic or pre-operative center.  If you're having a , you may not need this exam. Talk to your care team.  At your pre-op exam, talk to your care team about all medicines you take. If you need to stop any medicines before surgery, ask when to start taking them again.  We do this for your safety. Many medicines can make you bleed too much during surgery. Some change how well surgery (anesthesia) drugs work.  Call your insurance company to let them know you're having surgery. (If you don't have insurance, call [phone].)  Call your clinic if there's any change in your health. This includes signs of a cold or flu (sore throat, runny nose, cough, rash, fever). It also includes  a scrape or scratch near the surgery site.  If you have questions on the day of surgery, call your hospital or surgery center.  COVID testing  You may need to be tested for COVID-19 before having surgery. If so, we will give you instructions.  Eating and drinking guidelines  For your safety: Unless your surgeon tells you otherwise, follow the guidelines below.  Eat and drink as usual until 8 hours before surgery. After that, no food or milk.  Drink clear liquids until 2 hours before surgery. These are liquids you can see through, like water, Gatorade and Propel Water. You may also have black coffee and tea (no cream or milk).  Nothing by mouth within 2 hours of surgery. This includes gum, candy and breath mints.  If you drink alcohol: Stop drinking it the night before surgery.  If your care team tells you to take medicine on the morning of surgery, it's okay to take it with a sip of water.  Preventing infection  Shower or bathe the night before and morning of your surgery. Follow the instructions your clinic gave you. (If no instructions, use regular soap.)  Don't shave or clip hair near your surgery site. We'll remove the hair if needed.  Don't smoke or vape the morning of surgery. You may chew nicotine gum up to 2 hours before surgery. A nicotine patch is okay.  Note: Some surgeries require you to completely quit smoking and nicotine. Check with your surgeon.  Your care team will make every effort to keep you safe from infection. We will:  Clean our hands often with soap and water (or an alcohol-based hand rub).  Clean the skin at your surgery site with a special soap that kills germs.  Give you a special gown to keep you warm. (Cold raises the risk of infection.)  Wear special hair covers, masks, gowns and gloves during surgery.  Give antibiotic medicine, if prescribed. Not all surgeries need antibiotics.  What to bring on the day of surgery  Photo ID and insurance card  Copy of your health care directive, if you  have one  Glasses and hearing aides (bring cases)  You can't wear contacts during surgery  Inhaler and eye drops, if you use them (tell us about these when you arrive)  CPAP machine or breathing device, if you use them  A few personal items, if spending the night  If you have . . .  A pacemaker, ICD (cardiac defibrillator) or other implant: Bring the ID card.  An implanted stimulator: Bring the remote control.  A legal guardian: Bring a copy of the certified (court-stamped) guardianship papers.  Please remove any jewelry, including body piercings. Leave jewelry and other valuables at home.  If you're going home the day of surgery  You must have a responsible adult drive you home. They should stay with you overnight as well.  If you don't have someone to stay with you, and you aren't safe to go home alone, we may keep you overnight. Insurance often won't pay for this.  After surgery  If it's hard to control your pain or you need more pain medicine, please call your surgeon's office.  Questions?   If you have any questions for your care team, list them here: _________________________________________________________________________________________________________________________________________________________________________ ____________________________________ ____________________________________ ____________________________________  For informational purposes only. Not to replace the advice of your health care provider. Copyright   2003, 2019 [facility]. All rights reserved. Clinically reviewed by Sofiya Ramos MD. [facility] [id number] - REV 07/21.

## 2022-06-29 NOTE — PROGRESS NOTES
[facility]  [address] [facility] [address]  Phone: [phone]  Fax: [phone]  Primary Provider: Declan Smith  Pre-op Performing Provider: DECLAN SMITH      PREOPERATIVE EVALUATION:  Today's date: 6/29/2022    Harrison Barrientos is a 71 year old male who presents for a preoperative evaluation.    Surgical Information:  Surgery/Procedure: Right Total Knee Arthroplasty Scar Tissue Revision   Surgery Location: [facility]   Surgeon: Dr. Estrada   Surgery Date: 7/6/2022  Time of Surgery: 7:20am   Where patient plans to recover: At home with family  Fax number for surgical facility: Note does not need to be faxed, will be available electronically in Epic.    Type of Anesthesia Anticipated: General    Assessment & Plan     The proposed surgical procedure is considered INTERMEDIATE risk.    Pre-operative examination  Knee stiffness, right  S/P total knee arthroplasty, right    Patient is approved for surgery  EKG reveals sinus bradycardia with a left bundle branch block.  There are no acute changes noted  Sodium is 142 with a potassium of 4.7 and GFR of 88  Hemoglobin is 14.8  Patient will hold Eliquis 3 days prior to his procedure  He may hold lisinopril on the morning of surgery  Recommend resuming Eliquis as soon as possible after surgery when approved by orthopedics  Follow-up as recommended by orthopedics    - EKG 12-lead, tracing only  - Basic metabolic panel  - CBC with platelets  - Basic metabolic panel  - CBC with platelets    Ischemic cardiomyopathy  Ischemic congestive cardiomyopathy (H)    Continue atorvastatin, carvedilol, and lisinopril    Benign prostatic hyperplasia, unspecified whether lower urinary tract symptoms present    Monitor for urinary retention following surgery           Risks and Recommendations:  The patient has the following additional risks and recommendations for perioperative complications:   - No identified additional  risk factors other than previously addressed    Medication Instructions:  Patient is to take all scheduled medications on the day of surgery EXCEPT for modifications listed below:  Eliquis will be held 3 days prior to surgery    RECOMMENDATION:  APPROVAL GIVEN to proceed with proposed procedure, without further diagnostic evaluation.    Review of external notes as documented above     Subjective     HPI related to upcoming procedure:     This is a pleasant 71-year-old male presents to clinic for preoperative evaluation.  He has had right knee stiffness and limitation in range of motion following previous right total knee arthroplasty.  He therefore will have a revision given the scar tissue.  He has tolerated anesthesia previously.    Medical history is notable for coronary artery disease, ischemic cardiomyopathy, benign prostatic hypertrophy with obstructive symptoms, and an elevated PSA as well as hyperlipidemia.       Regarding his cardiovascular history he had a previous myocardial infarction in 2004.  He had a single-vessel coronary artery disease with coronary stent placement in the proximal LAD at the time.  He has followed up with cardiology on a consistent basis for an ischemic cardiomyopathy.    A previous echocardiogram revealed an ejection fraction of 32% with mild mitral regurgitation and mild tricuspid regurgitation.    A follow-up nuclear cardiac stress test was negative for inducible myocardial ischemia in 2020.  There was evidence of a previous transmural myocardial infarction in the mid to apical segments and anteroseptal segment.  There was akinesis in the mid to apical anterior wall and distal anteroseptal segment.       He continues to follow-up with Dr. Ohara, cardiology.  She performed a recent evaluation and approved him for the upcoming surgery.  In the meantime, he did have an abnormal cardiac MRI showing a moderate reduction in the left jugular ejection fraction.  The ejection fraction was  estimated to be 40.9%.  There was an area of nontransmural myocardial infarction.  Also, there was a small area of mural thrombus involving the distal anteroseptal and apical septal wall segments.  He has been treated with Eliquis.  He will hold Eliquis prior to procedure and resume this following his procedure.       Preop Questions 6/24/2022   1. Have you ever had a heart attack or stroke? YES - See chart note   2. Have you ever had surgery on your heart or blood vessels, such as a stent placement, a coronary artery bypass, or surgery on an artery in your head, neck, heart, or legs? YES -  See chart note   3. Do you have chest pain with activity? No   4. Do you have a history of  heart failure? No   5. Do you currently have a cold, bronchitis or symptoms of other infection? No   6. Do you have a cough, shortness of breath, or wheezing? No   7. Do you or anyone in your family have previous history of blood clots? No   8. Do you or does anyone in your family have a serious bleeding problem such as prolonged bleeding following surgeries or cuts? No   9. Have you ever had problems with anemia or been told to take iron pills? No   10. Have you had any abnormal blood loss such as black, tarry or bloody stools? No   11. Have you ever had a blood transfusion? No   12. Are you willing to have a blood transfusion if it is medically needed before, during, or after your surgery? Yes   13. Have you or any of your relatives ever had problems with anesthesia? No   14. Do you have sleep apnea, excessive snoring or daytime drowsiness? No   15. Do you have any artifical heart valves or other implanted medical devices like a pacemaker, defibrillator, or continuous glucose monitor? No   16. Do you have artificial joints? YES - s/p right TKA   17. Are you allergic to latex? No       Health Care Directive:  Patient does not have a Health Care Directive or Living Will: Patient states has Advance Directive and will bring in a copy to  clinic.    Preoperative Review of :   reviewed - no record of controlled substances prescribed.      Status of Chronic Conditions:  See problem list for active medical problems.  Problems all longstanding and stable, except as noted/documented.  See ROS for pertinent symptoms related to these conditions.      Review of Systems  Constitutional, neuro, ENT, endocrine, pulmonary, cardiac, gastrointestinal, genitourinary, musculoskeletal, integument and psychiatric systems are negative, except as otherwise noted.    Patient Active Problem List    Diagnosis Date Noted     Arthritis of left knee 12/13/2021     Priority: Medium     Adenomatous polyp of sigmoid colon 11/09/2019     Priority: Medium     Ischemic cardiomyopathy      Priority: Medium     Created by Conversion         Hypercholesterolemia      Priority: Medium     Created by Conversion         BPH (benign prostatic hyperplasia)      Priority: Medium     Created by Conversion         Diverticulosis      Priority: Medium     Created by Conversion  Replacement Utility updated for latest IMO load         Coronary Artery Disease      Priority: Medium     Created by Conversion  Replacement Utility updated for latest IMO load         Benign Prostatic Hypertrophy With Urinary Obstruction      Priority: Medium     Created by Conversion         Squamous Cell Carcinoma Of The Skin      Priority: Medium     Created by Conversion         Joint Pain, Localized In The Shoulder      Priority: Medium     Created by Conversion         Fatigue      Priority: Medium     Created by Conversion         Neck Pain      Priority: Medium     Created by Conversion         Serology Prostate-specific Antigen (PSA) Elevated      Priority: Medium     Created by Conversion          Past Medical History:   Diagnosis Date     Antiplatelet or antithrombotic long-term use      Arthritis      BPH (benign prostatic hyperplasia)      Coronary artery disease      Diverticulosis       Hyperlipidemia      Hypertension      Ischemic cardiomyopathy      Myocardial infarction (H) 2004     Prostate hyperplasia, benign localized, without urinary obstruction      Squamous cell carcinoma of skin      Stented coronary artery      Past Surgical History:   Procedure Laterality Date     ARTHROPLASTY KNEE Right 12/13/2021    Procedure: RIGHT TOTAL KNEE ARTHROPLASTY;  Surgeon: Magdi Estrada MD;  Location: [facility]     CORONARY STENT PLACEMENT       CYSTOSCOPY PROSTATE W/ LASER N/A 11/29/2016    Procedure: CYSTOSCOPY WITH TRANSURETHRAL RESECTION OF THE PROSTATE WITH QUANTA LASER;  Surgeon: Tre Cuadra MD;  [place] [facility] [place]  Service:      Current Outpatient Medications   Medication Sig Dispense Refill     atorvastatin (LIPITOR) 40 MG tablet TAKE ONE TABLET BY MOUTH ONCE DAILY 90 tablet 3     carvedilol (COREG) 12.5 MG tablet TAKE ONE TABLET BY MOUTH TWICE A DAY WITH MEALS 180 tablet 1     ELIQUIS ANTICOAGULANT 5 MG tablet TAKE ONE TABLET BY MOUTH TWICE A  tablet 1     lisinopril (ZESTRIL) 10 MG tablet TAKE 1 TABLET (10 MG TOTAL) BY MOUTH DAILY. 90 tablet 1     loratadine (CLARITIN) 10 mg tablet [LORATADINE (CLARITIN) 10 MG TABLET] Take 10 mg by mouth daily as needed.        acetaminophen (TYLENOL) 325 MG tablet Take 2 tablets (650 mg) by mouth every 4 hours as needed for other (mild pain) (Patient not taking: Reported on 6/29/2022) 100 tablet 0     senna-docusate (SENOKOT-S/PERICOLACE) 8.6-50 MG tablet Take 1-2 tablets by mouth 2 times daily Take while on oral narcotics to prevent or treat constipation. (Patient not taking: Reported on 6/29/2022) 30 tablet 0       No Known Allergies     Social History     Tobacco Use     Smoking status: Never Smoker     Smokeless tobacco: Never Used   Substance Use Topics     Alcohol use: Yes     Comment: rare     Family History   Problem Relation Age of Onset     Heart Disease Mother         coronary stents     Benign prostatic  "hyperplasia Father      Heart Disease Father      Kidney Cancer Father      Benign prostatic hyperplasia Paternal Uncle      Benign prostatic hyperplasia Paternal Grandfather      History   Drug Use No         Objective     Resp 16   Ht 1.854 m (6' 1\")   Wt 97.5 kg (215 lb)   BMI 28.37 kg/m      Physical Exam    GENERAL APPEARANCE: healthy, alert and no distress     EYES: EOMI,  PERRL     HENT: ear canals and TM's normal and nose and mouth without ulcers or lesions     NECK: no adenopathy, no asymmetry, masses, or scars and thyroid normal to palpation     RESP: lungs clear to auscultation - no rales, rhonchi or wheezes     CV: regular rates and rhythm, normal S1 S2, no S3 or S4 and no murmur, click or rub     ABDOMEN:  soft, nontender, no HSM or masses and bowel sounds normal     MS: extremities normal- no gross deformities noted, no evidence of inflammation in joints, FROM in all extremities.     SKIN: no suspicious lesions or rashes     NEURO: Normal strength and tone, sensory exam grossly normal, mentation intact and speech normal     PSYCH: mentation appears normal. and affect normal/bright     LYMPHATICS: No cervical adenopathy    Recent Labs   Lab Test 03/02/22  1621 12/14/21  1030 12/10/21  1124   HGB 14.5 14.5 16.1    201 208   INR  --   --  1.06    139 138   POTASSIUM 4.4 4.6 4.5   CR 0.80 0.96 0.78        Diagnostics:  Recent Results (from the past 240 hour(s))   EKG 12-lead, tracing only    Collection Time: 06/29/22 11:56 AM   Result Value Ref Range    Systolic Blood Pressure  mmHg    Diastolic Blood Pressure  mmHg    Ventricular Rate 55 BPM    Atrial Rate 55 BPM    VT Interval 196 ms    QRS Duration 126 ms     ms    QTc 424 ms    P Axis 33 degrees    R AXIS -57 degrees    T Axis 40 degrees    Interpretation ECG       Sinus bradycardia  Left bundle branch block  Abnormal ECG  When compared with ECG of 10-DEC-2021 16:35,  No significant change was found  Confirmed by TELMA GUIDO MD " LOC:JAVI ([id number]) on 6/29/2022 4:00:54 PM     Basic metabolic panel    Collection Time: 06/29/22 12:10 PM   Result Value Ref Range    Creatinine 0.94 0.67 - 1.17 mg/dL    Sodium 142 136 - 145 mmol/L    Potassium 4.7 3.4 - 5.3 mmol/L    Urea Nitrogen 18.2 8.0 - 23.0 mg/dL    Chloride 106 98 - 107 mmol/L    Carbon Dioxide (CO2) 25 22 - 29 mmol/L    Anion Gap 11 7 - 15 mmol/L    Glucose 103 (H) 70 - 99 mg/dL    GFR Estimate 88 >60 mL/min/1.73m2    Calcium 9.6 8.8 - 10.2 mg/dL   CBC with platelets    Collection Time: 06/29/22 12:10 PM   Result Value Ref Range    WBC Count 5.8 4.0 - 11.0 10e3/uL    RBC Count 5.08 4.40 - 5.90 10e6/uL    Hemoglobin 14.8 13.3 - 17.7 g/dL    Hematocrit 45.0 40.0 - 53.0 %    MCV 89 78 - 100 fL    MCH 29.1 26.5 - 33.0 pg    MCHC 32.9 31.5 - 36.5 g/dL    RDW 12.9 10.0 - 15.0 %    Platelet Count 186 150 - 450 10e3/uL   Asymptomatic COVID-19 Virus (Coronavirus) by PCR Nose    Collection Time: 07/02/22 11:46 AM    Specimen: Nose; Swab   Result Value Ref Range    SARS CoV2 PCR Negative Negative, Testing sent to reference lab. Results will be returned via unsolicited result      EKG: See repot, unchanged from previous tracings    Revised Cardiac Risk Index (RCRI):  The patient has the following serious cardiovascular risks for perioperative complications:   - Coronary Artery Disease (MI, positive stress test, angina, Qs on EKG) = 1 point     RCRI Interpretation: 1 point: Class II (low risk - 0.9% complication rate)           Signed Electronically by: Brad Roldan MD  Copy of this evaluation report is provided to requesting physician.

## 2022-06-29 NOTE — PROVIDER NOTIFICATION
Discharge plan according to [facility]:       06/24/22 1103   Discharge Planning   Patient/Family Anticipates Transition to home with family;home   Living Arrangements   People in Home spouse   Type of Residence Private Residence   Is your private residence a single family home or apartment? Single family home   Number of Stairs, Within Home, Primary ten   Stair Railings, Within Home, Primary railings safe and in good condition   Once home, are you able to live on one level? Yes   Which level? Upper Level   Bathroom Shower/Tub Tub/Shower unit   Equipment Currently Used at Home raised toilet seat   Support System   Support Systems Spouse/Significant Other   Do you have someone available to stay with you one or two nights once you are home? Yes

## 2022-07-02 ENCOUNTER — LAB (OUTPATIENT)
Dept: FAMILY MEDICINE | Facility: CLINIC | Age: 72
End: 2022-07-02
Payer: COMMERCIAL

## 2022-07-02 DIAGNOSIS — Z20.822 ENCOUNTER FOR LABORATORY TESTING FOR COVID-19 VIRUS: ICD-10-CM

## 2022-07-02 LAB — SARS-COV-2 RNA RESP QL NAA+PROBE: NEGATIVE

## 2022-07-02 PROCEDURE — U0005 INFEC AGEN DETEC AMPLI PROBE: HCPCS

## 2022-07-02 PROCEDURE — U0003 INFECTIOUS AGENT DETECTION BY NUCLEIC ACID (DNA OR RNA); SEVERE ACUTE RESPIRATORY SYNDROME CORONAVIRUS 2 (SARS-COV-2) (CORONAVIRUS DISEASE [COVID-19]), AMPLIFIED PROBE TECHNIQUE, MAKING USE OF HIGH THROUGHPUT TECHNOLOGIES AS DESCRIBED BY CMS-2020-01-R: HCPCS

## 2022-07-05 ENCOUNTER — ANESTHESIA EVENT (OUTPATIENT)
Dept: SURGERY | Facility: CLINIC | Age: 72
End: 2022-07-05
Payer: COMMERCIAL

## 2022-07-06 ENCOUNTER — APPOINTMENT (OUTPATIENT)
Dept: RADIOLOGY | Facility: CLINIC | Age: 72
End: 2022-07-06
Attending: PHYSICIAN ASSISTANT
Payer: COMMERCIAL

## 2022-07-06 ENCOUNTER — ANESTHESIA (OUTPATIENT)
Dept: SURGERY | Facility: CLINIC | Age: 72
End: 2022-07-06
Payer: COMMERCIAL

## 2022-07-06 ENCOUNTER — HOSPITAL ENCOUNTER (OUTPATIENT)
Facility: CLINIC | Age: 72
Discharge: HOME | End: 2022-07-07
Attending: ORTHOPAEDIC SURGERY | Admitting: ORTHOPAEDIC SURGERY
Payer: COMMERCIAL

## 2022-07-06 DIAGNOSIS — M17.12 ARTHRITIS OF LEFT KNEE: Primary | ICD-10-CM

## 2022-07-06 PROBLEM — Z47.89 ORTHOPEDIC AFTERCARE: Status: ACTIVE | Noted: 2022-07-06

## 2022-07-06 LAB
GRAM STAIN RESULT: NORMAL
GRAM STAIN RESULT: NORMAL

## 2022-07-06 PROCEDURE — 272N000001 HC OR GENERAL SUPPLY STERILE: Performed by: ORTHOPAEDIC SURGERY

## 2022-07-06 PROCEDURE — 258N000001 HC RX 258: Performed by: ORTHOPAEDIC SURGERY

## 2022-07-06 PROCEDURE — 99222 1ST HOSP IP/OBS MODERATE 55: CPT | Performed by: FAMILY MEDICINE

## 2022-07-06 PROCEDURE — 370N000017 HC ANESTHESIA TECHNICAL FEE, PER MIN: Performed by: ORTHOPAEDIC SURGERY

## 2022-07-06 PROCEDURE — 999N000141 HC STATISTIC PRE-PROCEDURE NURSING ASSESSMENT: Performed by: ORTHOPAEDIC SURGERY

## 2022-07-06 PROCEDURE — 258N000003 HC RX IP 258 OP 636: Performed by: PHYSICIAN ASSISTANT

## 2022-07-06 PROCEDURE — 250N000009 HC RX 250: Performed by: ORTHOPAEDIC SURGERY

## 2022-07-06 PROCEDURE — 999N000065 XR KNEE PORT RIGHT 1/2 VIEWS: Mod: RT

## 2022-07-06 PROCEDURE — 250N000013 HC RX MED GY IP 250 OP 250 PS 637: Performed by: FAMILY MEDICINE

## 2022-07-06 PROCEDURE — 87075 CULTR BACTERIA EXCEPT BLOOD: CPT | Performed by: ORTHOPAEDIC SURGERY

## 2022-07-06 PROCEDURE — 710N000010 HC RECOVERY PHASE 1, LEVEL 2, PER MIN: Performed by: ORTHOPAEDIC SURGERY

## 2022-07-06 PROCEDURE — 87070 CULTURE OTHR SPECIMN AEROBIC: CPT | Performed by: ORTHOPAEDIC SURGERY

## 2022-07-06 PROCEDURE — 258N000003 HC RX IP 258 OP 636: Performed by: NURSE ANESTHETIST, CERTIFIED REGISTERED

## 2022-07-06 PROCEDURE — 250N000009 HC RX 250: Performed by: NURSE ANESTHETIST, CERTIFIED REGISTERED

## 2022-07-06 PROCEDURE — 250N000011 HC RX IP 250 OP 636: Performed by: NURSE ANESTHETIST, CERTIFIED REGISTERED

## 2022-07-06 PROCEDURE — 250N000013 HC RX MED GY IP 250 OP 250 PS 637: Performed by: PHYSICIAN ASSISTANT

## 2022-07-06 PROCEDURE — 250N000011 HC RX IP 250 OP 636: Performed by: PHYSICIAN ASSISTANT

## 2022-07-06 PROCEDURE — 258N000003 HC RX IP 258 OP 636: Performed by: ANESTHESIOLOGY

## 2022-07-06 PROCEDURE — 250N000013 HC RX MED GY IP 250 OP 250 PS 637

## 2022-07-06 PROCEDURE — 250N000011 HC RX IP 250 OP 636

## 2022-07-06 PROCEDURE — 87205 SMEAR GRAM STAIN: CPT | Performed by: ORTHOPAEDIC SURGERY

## 2022-07-06 PROCEDURE — 250N000011 HC RX IP 250 OP 636: Performed by: ANESTHESIOLOGY

## 2022-07-06 PROCEDURE — 120N000001 HC R&B MED SURG/OB

## 2022-07-06 PROCEDURE — 250N000025 HC SEVOFLURANE, PER MIN: Performed by: ORTHOPAEDIC SURGERY

## 2022-07-06 PROCEDURE — 360N000078 HC SURGERY LEVEL 5, PER MIN: Performed by: ORTHOPAEDIC SURGERY

## 2022-07-06 RX ORDER — NALOXONE HYDROCHLORIDE 0.4 MG/ML
0.2 INJECTION, SOLUTION INTRAMUSCULAR; INTRAVENOUS; SUBCUTANEOUS
Status: DISCONTINUED | OUTPATIENT
Start: 2022-07-06 | End: 2022-07-07 | Stop reason: HOSPADM

## 2022-07-06 RX ORDER — FENTANYL CITRATE 50 UG/ML
25 INJECTION, SOLUTION INTRAMUSCULAR; INTRAVENOUS EVERY 5 MIN PRN
Status: DISCONTINUED | OUTPATIENT
Start: 2022-07-06 | End: 2022-07-06 | Stop reason: HOSPADM

## 2022-07-06 RX ORDER — SODIUM CHLORIDE, SODIUM LACTATE, POTASSIUM CHLORIDE, CALCIUM CHLORIDE 600; 310; 30; 20 MG/100ML; MG/100ML; MG/100ML; MG/100ML
INJECTION, SOLUTION INTRAVENOUS CONTINUOUS
Status: DISCONTINUED | OUTPATIENT
Start: 2022-07-06 | End: 2022-07-07 | Stop reason: HOSPADM

## 2022-07-06 RX ORDER — LIDOCAINE HYDROCHLORIDE 10 MG/ML
INJECTION, SOLUTION INFILTRATION; PERINEURAL PRN
Status: DISCONTINUED | OUTPATIENT
Start: 2022-07-06 | End: 2022-07-06

## 2022-07-06 RX ORDER — LISINOPRIL 10 MG/1
10 TABLET ORAL EVERY EVENING
Status: DISCONTINUED | OUTPATIENT
Start: 2022-07-06 | End: 2022-07-07 | Stop reason: HOSPADM

## 2022-07-06 RX ORDER — HYDROMORPHONE HCL IN WATER/PF 6 MG/30 ML
0.4 PATIENT CONTROLLED ANALGESIA SYRINGE INTRAVENOUS
Status: DISCONTINUED | OUTPATIENT
Start: 2022-07-06 | End: 2022-07-07 | Stop reason: HOSPADM

## 2022-07-06 RX ORDER — OXYCODONE HYDROCHLORIDE 5 MG/1
5 TABLET ORAL EVERY 4 HOURS PRN
Status: DISCONTINUED | OUTPATIENT
Start: 2022-07-06 | End: 2022-07-07 | Stop reason: HOSPADM

## 2022-07-06 RX ORDER — LIDOCAINE 40 MG/G
CREAM TOPICAL
Status: DISCONTINUED | OUTPATIENT
Start: 2022-07-06 | End: 2022-07-06 | Stop reason: HOSPADM

## 2022-07-06 RX ORDER — CEFAZOLIN SODIUM/WATER 2 G/20 ML
2 SYRINGE (ML) INTRAVENOUS
Status: COMPLETED | OUTPATIENT
Start: 2022-07-06 | End: 2022-07-06

## 2022-07-06 RX ORDER — KETAMINE HYDROCHLORIDE 10 MG/ML
INJECTION INTRAMUSCULAR; INTRAVENOUS PRN
Status: DISCONTINUED | OUTPATIENT
Start: 2022-07-06 | End: 2022-07-06

## 2022-07-06 RX ORDER — ONDANSETRON 4 MG/1
4 TABLET, ORALLY DISINTEGRATING ORAL EVERY 6 HOURS PRN
Status: DISCONTINUED | OUTPATIENT
Start: 2022-07-06 | End: 2022-07-07 | Stop reason: HOSPADM

## 2022-07-06 RX ORDER — OXYCODONE HYDROCHLORIDE 5 MG/1
5 TABLET ORAL EVERY 4 HOURS PRN
Status: DISCONTINUED | OUTPATIENT
Start: 2022-07-06 | End: 2022-07-06 | Stop reason: HOSPADM

## 2022-07-06 RX ORDER — AMOXICILLIN 250 MG
1 CAPSULE ORAL 2 TIMES DAILY
Status: DISCONTINUED | OUTPATIENT
Start: 2022-07-06 | End: 2022-07-07 | Stop reason: HOSPADM

## 2022-07-06 RX ORDER — FENTANYL CITRATE 50 UG/ML
25 INJECTION, SOLUTION INTRAMUSCULAR; INTRAVENOUS
Status: DISCONTINUED | OUTPATIENT
Start: 2022-07-06 | End: 2022-07-06 | Stop reason: HOSPADM

## 2022-07-06 RX ORDER — MEPERIDINE HYDROCHLORIDE 25 MG/ML
12.5 INJECTION INTRAMUSCULAR; INTRAVENOUS; SUBCUTANEOUS
Status: DISCONTINUED | OUTPATIENT
Start: 2022-07-06 | End: 2022-07-06 | Stop reason: HOSPADM

## 2022-07-06 RX ORDER — FENTANYL CITRATE 50 UG/ML
INJECTION, SOLUTION INTRAMUSCULAR; INTRAVENOUS PRN
Status: DISCONTINUED | OUTPATIENT
Start: 2022-07-06 | End: 2022-07-06

## 2022-07-06 RX ORDER — ONDANSETRON 2 MG/ML
4 INJECTION INTRAMUSCULAR; INTRAVENOUS EVERY 30 MIN PRN
Status: DISCONTINUED | OUTPATIENT
Start: 2022-07-06 | End: 2022-07-06 | Stop reason: HOSPADM

## 2022-07-06 RX ORDER — GLYCOPYRROLATE 0.2 MG/ML
INJECTION, SOLUTION INTRAMUSCULAR; INTRAVENOUS PRN
Status: DISCONTINUED | OUTPATIENT
Start: 2022-07-06 | End: 2022-07-06

## 2022-07-06 RX ORDER — HYDROXYZINE HYDROCHLORIDE 10 MG/1
10 TABLET, FILM COATED ORAL EVERY 6 HOURS PRN
Status: DISCONTINUED | OUTPATIENT
Start: 2022-07-06 | End: 2022-07-07 | Stop reason: HOSPADM

## 2022-07-06 RX ORDER — ONDANSETRON 2 MG/ML
INJECTION INTRAMUSCULAR; INTRAVENOUS PRN
Status: DISCONTINUED | OUTPATIENT
Start: 2022-07-06 | End: 2022-07-06

## 2022-07-06 RX ORDER — BISACODYL 10 MG
10 SUPPOSITORY, RECTAL RECTAL DAILY PRN
Status: DISCONTINUED | OUTPATIENT
Start: 2022-07-06 | End: 2022-07-07 | Stop reason: HOSPADM

## 2022-07-06 RX ORDER — SODIUM CHLORIDE, SODIUM LACTATE, POTASSIUM CHLORIDE, CALCIUM CHLORIDE 600; 310; 30; 20 MG/100ML; MG/100ML; MG/100ML; MG/100ML
INJECTION, SOLUTION INTRAVENOUS CONTINUOUS
Status: DISCONTINUED | OUTPATIENT
Start: 2022-07-06 | End: 2022-07-06 | Stop reason: HOSPADM

## 2022-07-06 RX ORDER — PROPOFOL 10 MG/ML
INJECTION, EMULSION INTRAVENOUS PRN
Status: DISCONTINUED | OUTPATIENT
Start: 2022-07-06 | End: 2022-07-06

## 2022-07-06 RX ORDER — ONDANSETRON 4 MG/1
4 TABLET, ORALLY DISINTEGRATING ORAL EVERY 30 MIN PRN
Status: DISCONTINUED | OUTPATIENT
Start: 2022-07-06 | End: 2022-07-06 | Stop reason: HOSPADM

## 2022-07-06 RX ORDER — CARVEDILOL 12.5 MG/1
12.5 TABLET ORAL 2 TIMES DAILY WITH MEALS
Status: DISCONTINUED | OUTPATIENT
Start: 2022-07-06 | End: 2022-07-07 | Stop reason: HOSPADM

## 2022-07-06 RX ORDER — DEXAMETHASONE SODIUM PHOSPHATE 10 MG/ML
INJECTION, SOLUTION INTRAMUSCULAR; INTRAVENOUS PRN
Status: DISCONTINUED | OUTPATIENT
Start: 2022-07-06 | End: 2022-07-06

## 2022-07-06 RX ORDER — NALOXONE HYDROCHLORIDE 0.4 MG/ML
0.4 INJECTION, SOLUTION INTRAMUSCULAR; INTRAVENOUS; SUBCUTANEOUS
Status: DISCONTINUED | OUTPATIENT
Start: 2022-07-06 | End: 2022-07-07 | Stop reason: HOSPADM

## 2022-07-06 RX ORDER — CEFAZOLIN SODIUM/WATER 2 G/20 ML
2 SYRINGE (ML) INTRAVENOUS SEE ADMIN INSTRUCTIONS
Status: DISCONTINUED | OUTPATIENT
Start: 2022-07-06 | End: 2022-07-06 | Stop reason: HOSPADM

## 2022-07-06 RX ORDER — ONDANSETRON 2 MG/ML
4 INJECTION INTRAMUSCULAR; INTRAVENOUS EVERY 6 HOURS PRN
Status: DISCONTINUED | OUTPATIENT
Start: 2022-07-06 | End: 2022-07-07 | Stop reason: HOSPADM

## 2022-07-06 RX ORDER — LIDOCAINE 40 MG/G
CREAM TOPICAL
Status: DISCONTINUED | OUTPATIENT
Start: 2022-07-06 | End: 2022-07-07 | Stop reason: HOSPADM

## 2022-07-06 RX ORDER — HYDROMORPHONE HCL IN WATER/PF 6 MG/30 ML
0.2 PATIENT CONTROLLED ANALGESIA SYRINGE INTRAVENOUS
Status: DISCONTINUED | OUTPATIENT
Start: 2022-07-06 | End: 2022-07-07 | Stop reason: HOSPADM

## 2022-07-06 RX ORDER — ATORVASTATIN CALCIUM 40 MG/1
40 TABLET, FILM COATED ORAL EVERY EVENING
Status: DISCONTINUED | OUTPATIENT
Start: 2022-07-06 | End: 2022-07-07 | Stop reason: HOSPADM

## 2022-07-06 RX ORDER — MAGNESIUM HYDROXIDE 1200 MG/15ML
LIQUID ORAL PRN
Status: DISCONTINUED | OUTPATIENT
Start: 2022-07-06 | End: 2022-07-06 | Stop reason: HOSPADM

## 2022-07-06 RX ORDER — HYDROMORPHONE HCL IN WATER/PF 6 MG/30 ML
0.2 PATIENT CONTROLLED ANALGESIA SYRINGE INTRAVENOUS EVERY 5 MIN PRN
Status: DISCONTINUED | OUTPATIENT
Start: 2022-07-06 | End: 2022-07-06 | Stop reason: HOSPADM

## 2022-07-06 RX ORDER — CEFAZOLIN SODIUM 2 G/100ML
2 INJECTION, SOLUTION INTRAVENOUS EVERY 8 HOURS
Status: COMPLETED | OUTPATIENT
Start: 2022-07-06 | End: 2022-07-07

## 2022-07-06 RX ORDER — PROCHLORPERAZINE MALEATE 5 MG
5 TABLET ORAL EVERY 6 HOURS PRN
Status: DISCONTINUED | OUTPATIENT
Start: 2022-07-06 | End: 2022-07-07 | Stop reason: HOSPADM

## 2022-07-06 RX ORDER — OXYCODONE HYDROCHLORIDE 5 MG/1
10 TABLET ORAL EVERY 4 HOURS PRN
Status: DISCONTINUED | OUTPATIENT
Start: 2022-07-06 | End: 2022-07-07 | Stop reason: HOSPADM

## 2022-07-06 RX ORDER — ACETAMINOPHEN 325 MG/1
975 TABLET ORAL EVERY 8 HOURS
Status: DISCONTINUED | OUTPATIENT
Start: 2022-07-06 | End: 2022-07-07 | Stop reason: HOSPADM

## 2022-07-06 RX ORDER — LORATADINE 10 MG/1
10 TABLET ORAL DAILY PRN
Status: DISCONTINUED | OUTPATIENT
Start: 2022-07-06 | End: 2022-07-07 | Stop reason: HOSPADM

## 2022-07-06 RX ORDER — FENTANYL CITRATE 50 UG/ML
50 INJECTION, SOLUTION INTRAMUSCULAR; INTRAVENOUS
Status: DISCONTINUED | OUTPATIENT
Start: 2022-07-06 | End: 2022-07-06 | Stop reason: HOSPADM

## 2022-07-06 RX ORDER — TRANEXAMIC ACID 650 MG/1
1950 TABLET ORAL ONCE
Status: COMPLETED | OUTPATIENT
Start: 2022-07-06 | End: 2022-07-06

## 2022-07-06 RX ORDER — POLYETHYLENE GLYCOL 3350 17 G/17G
17 POWDER, FOR SOLUTION ORAL DAILY
Status: DISCONTINUED | OUTPATIENT
Start: 2022-07-07 | End: 2022-07-07 | Stop reason: HOSPADM

## 2022-07-06 RX ORDER — ACETAMINOPHEN 325 MG/1
650 TABLET ORAL EVERY 4 HOURS PRN
Status: DISCONTINUED | OUTPATIENT
Start: 2022-07-09 | End: 2022-07-07 | Stop reason: HOSPADM

## 2022-07-06 RX ORDER — ACETAMINOPHEN 325 MG/1
975 TABLET ORAL ONCE
Status: COMPLETED | OUTPATIENT
Start: 2022-07-06 | End: 2022-07-06

## 2022-07-06 RX ADMIN — PHENYLEPHRINE HYDROCHLORIDE 100 MCG: 10 INJECTION INTRAVENOUS at 08:27

## 2022-07-06 RX ADMIN — PHENYLEPHRINE HYDROCHLORIDE 100 MCG: 10 INJECTION INTRAVENOUS at 07:43

## 2022-07-06 RX ADMIN — ATORVASTATIN CALCIUM 40 MG: 40 TABLET, FILM COATED ORAL at 20:30

## 2022-07-06 RX ADMIN — HYDROMORPHONE HYDROCHLORIDE 0.5 MG: 1 INJECTION, SOLUTION INTRAMUSCULAR; INTRAVENOUS; SUBCUTANEOUS at 08:07

## 2022-07-06 RX ADMIN — GLYCOPYRROLATE 0.2 MG: 0.2 INJECTION, SOLUTION INTRAMUSCULAR; INTRAVENOUS at 07:33

## 2022-07-06 RX ADMIN — ACETAMINOPHEN 975 MG: 325 TABLET ORAL at 06:02

## 2022-07-06 RX ADMIN — PHENYLEPHRINE HYDROCHLORIDE 0.6 MCG: 10 INJECTION INTRAVENOUS at 08:36

## 2022-07-06 RX ADMIN — OXYCODONE HYDROCHLORIDE 5 MG: 5 TABLET ORAL at 16:56

## 2022-07-06 RX ADMIN — ONDANSETRON 4 MG: 2 INJECTION INTRAMUSCULAR; INTRAVENOUS at 08:22

## 2022-07-06 RX ADMIN — TRANEXAMIC ACID 1950 MG: 650 TABLET ORAL at 06:02

## 2022-07-06 RX ADMIN — PROPOFOL 120 MG: 10 INJECTION, EMULSION INTRAVENOUS at 07:33

## 2022-07-06 RX ADMIN — ACETAMINOPHEN 975 MG: 325 TABLET ORAL at 12:42

## 2022-07-06 RX ADMIN — PHENYLEPHRINE HYDROCHLORIDE 100 MCG: 10 INJECTION INTRAVENOUS at 07:41

## 2022-07-06 RX ADMIN — APIXABAN 5 MG: 5 TABLET, FILM COATED ORAL at 20:30

## 2022-07-06 RX ADMIN — FENTANYL CITRATE 50 MCG: 50 INJECTION, SOLUTION INTRAMUSCULAR; INTRAVENOUS at 09:01

## 2022-07-06 RX ADMIN — MIDAZOLAM 1 MG: 1 INJECTION INTRAMUSCULAR; INTRAVENOUS at 07:23

## 2022-07-06 RX ADMIN — CEFAZOLIN SODIUM 2 G: 2 INJECTION, SOLUTION INTRAVENOUS at 16:40

## 2022-07-06 RX ADMIN — ACETAMINOPHEN 975 MG: 325 TABLET ORAL at 20:29

## 2022-07-06 RX ADMIN — PHENYLEPHRINE HYDROCHLORIDE 100 MCG: 10 INJECTION INTRAVENOUS at 07:54

## 2022-07-06 RX ADMIN — HYDROMORPHONE HYDROCHLORIDE 0.5 MG: 1 INJECTION, SOLUTION INTRAMUSCULAR; INTRAVENOUS; SUBCUTANEOUS at 09:20

## 2022-07-06 RX ADMIN — HYDROMORPHONE HYDROCHLORIDE 0.5 MG: 1 INJECTION, SOLUTION INTRAMUSCULAR; INTRAVENOUS; SUBCUTANEOUS at 09:22

## 2022-07-06 RX ADMIN — LIDOCAINE HYDROCHLORIDE 50 MG: 10 INJECTION, SOLUTION INFILTRATION; PERINEURAL at 07:33

## 2022-07-06 RX ADMIN — SODIUM CHLORIDE, POTASSIUM CHLORIDE, SODIUM LACTATE AND CALCIUM CHLORIDE: 600; 310; 30; 20 INJECTION, SOLUTION INTRAVENOUS at 06:41

## 2022-07-06 RX ADMIN — GLYCOPYRROLATE 0.2 MG: 0.2 INJECTION, SOLUTION INTRAMUSCULAR; INTRAVENOUS at 07:52

## 2022-07-06 RX ADMIN — HYDROMORPHONE HYDROCHLORIDE 0.5 MG: 1 INJECTION, SOLUTION INTRAMUSCULAR; INTRAVENOUS; SUBCUTANEOUS at 08:11

## 2022-07-06 RX ADMIN — SENNOSIDES AND DOCUSATE SODIUM 1 TABLET: 50; 8.6 TABLET ORAL at 20:30

## 2022-07-06 RX ADMIN — PROPOFOL 30 MG: 10 INJECTION, EMULSION INTRAVENOUS at 08:46

## 2022-07-06 RX ADMIN — CARVEDILOL 12.5 MG: 12.5 TABLET, FILM COATED ORAL at 16:39

## 2022-07-06 RX ADMIN — DEXAMETHASONE SODIUM PHOSPHATE 10 MG: 10 INJECTION, SOLUTION INTRAMUSCULAR; INTRAVENOUS at 07:33

## 2022-07-06 RX ADMIN — FENTANYL CITRATE 50 MCG: 50 INJECTION, SOLUTION INTRAMUSCULAR; INTRAVENOUS at 06:53

## 2022-07-06 RX ADMIN — FENTANYL CITRATE 100 MCG: 50 INJECTION, SOLUTION INTRAMUSCULAR; INTRAVENOUS at 07:33

## 2022-07-06 RX ADMIN — KETAMINE HYDROCHLORIDE 50 MG: 10 INJECTION, SOLUTION INTRAMUSCULAR; INTRAVENOUS at 07:39

## 2022-07-06 RX ADMIN — MIDAZOLAM HYDROCHLORIDE 1 MG: 1 INJECTION, SOLUTION INTRAMUSCULAR; INTRAVENOUS at 06:51

## 2022-07-06 RX ADMIN — PHENYLEPHRINE HYDROCHLORIDE 0.2 MCG/KG/MIN: 10 INJECTION INTRAVENOUS at 07:45

## 2022-07-06 RX ADMIN — SODIUM CHLORIDE, POTASSIUM CHLORIDE, SODIUM LACTATE AND CALCIUM CHLORIDE: 600; 310; 30; 20 INJECTION, SOLUTION INTRAVENOUS at 12:44

## 2022-07-06 RX ADMIN — PROPOFOL 20 MG: 10 INJECTION, EMULSION INTRAVENOUS at 08:53

## 2022-07-06 RX ADMIN — PHENYLEPHRINE HYDROCHLORIDE 100 MCG: 10 INJECTION INTRAVENOUS at 07:48

## 2022-07-06 RX ADMIN — LISINOPRIL 10 MG: 10 TABLET ORAL at 20:30

## 2022-07-06 RX ADMIN — FENTANYL CITRATE 50 MCG: 50 INJECTION, SOLUTION INTRAMUSCULAR; INTRAVENOUS at 08:21

## 2022-07-06 RX ADMIN — PHENYLEPHRINE HYDROCHLORIDE 100 MCG: 10 INJECTION INTRAVENOUS at 07:36

## 2022-07-06 RX ADMIN — OXYCODONE HYDROCHLORIDE 5 MG: 5 TABLET ORAL at 23:55

## 2022-07-06 RX ADMIN — HYDROXYZINE HYDROCHLORIDE 10 MG: 10 TABLET ORAL at 23:55

## 2022-07-06 RX ADMIN — Medication 2 G: at 07:22

## 2022-07-06 RX ADMIN — CEFAZOLIN SODIUM 2 G: 2 INJECTION, SOLUTION INTRAVENOUS at 23:49

## 2022-07-06 ASSESSMENT — ACTIVITIES OF DAILY LIVING (ADL)
WEAR_GLASSES_OR_BLIND: YES
CHANGE_IN_FUNCTIONAL_STATUS_SINCE_ONSET_OF_CURRENT_ILLNESS/INJURY: NO
ADLS_ACUITY_SCORE: 23
TOILETING_ISSUES: NO
HEARING_DIFFICULTY_OR_DEAF: NO
FALL_HISTORY_WITHIN_LAST_SIX_MONTHS: NO
ADLS_ACUITY_SCORE: 24
WALKING_OR_CLIMBING_STAIRS_DIFFICULTY: NO
ADLS_ACUITY_SCORE: 22
DIFFICULTY_EATING/SWALLOWING: NO
ADLS_ACUITY_SCORE: 23
ADLS_ACUITY_SCORE: 23
ADLS_ACUITY_SCORE: 35
DOING_ERRANDS_INDEPENDENTLY_DIFFICULTY: NO
CONCENTRATING,_REMEMBERING_OR_MAKING_DECISIONS_DIFFICULTY: NO
ADLS_ACUITY_SCORE: 23
ADLS_ACUITY_SCORE: 23
DIFFICULTY_COMMUNICATING: NO
DRESSING/BATHING_DIFFICULTY: NO
ADLS_ACUITY_SCORE: 24

## 2022-07-06 NOTE — OP NOTE
Operative Note    Name:  Harrison Barrientos  Location: [facility] Main OR  Procedure Date:  7/6/2022  PCP:  Brad Roldan      Procedure(s):  RIGHT TOTAL KNEE SCAR EXCISION     * No implants in log *    Pre-Procedure Diagnosis:  Knee pain, right [M25.561]  Scar of knee [L90.5]     Post-Procedure Diagnosis:    Arthrofibrosis right Knee    Surgeon(s):  Elly Boston PA-C Grillo, William C, PA-C Nies, Matthew S, MD Wickum, Daren J, MD      Assistants:  Required for patient positioning, intraoperative retraction, patient safety, and wound closure.    Anesthesia Type:  General with Block     Findings:  Preprocedure range of motion: 10 degrees to 80 degrees  Postprocedure range of motion: 10 degrees to 110 degrees    Operative Report:      After satisfactory infiltration of regional block anesthetic in the preoperative holding area, the patient was taken to the operating room.  Spinal anesthesia was administered.  The patient's operative extremity was then prepped and draped sterile.  A timeout was then taken to ensure proper surgical site.  Previous scar was identified and incised.  Full-thickness skin flaps developed, medial parapatellar arthrotomy performed.  Extensive synovectomy was performed.  The bulk of the infrapatellar tendon, quads.  Submuscular quadricepsplasty with a Sims elevator.  Medial lateral gutters cleaned completely.  Skeletonized articular surface of patella.  Bleeders cauterized.  A generous lateral release was performed, further scar tissue removed.  Knee flexion with gravity was to 105 degrees, 110 degrees.  Could flex to 135, 140 degrees with a quadriceps intact.  There was a bit of rollback.  PCL was recessed completely.  The wound thoroughly irrigated with Irrisept and jet lavage.  Tourniquet deflated, bleeders cauterized.  Knee was then gently flexed and the quads reapproximated with interrupted #1 Vicryl oversewn with #1 strata fix.  Subcutaneous tissues reapproximated with 2-0  Vicryl, skin with Monocryl and Dermabond.  Sterile dressings were applied, patient was then awakened and taken to recovery stable.  Plan:  Initiate DVT prophylaxis protocol including early ambulation, mechanical and chemical prophylaxis.    Estimated Blood Loss:   100cc      Complications:    None    Magdi Estrada MD     Date: 7/6/2022  Time: 8:54 AM

## 2022-07-06 NOTE — ANESTHESIA POSTPROCEDURE EVALUATION
Patient: Harrison Barrientos    Procedure: Procedure(s):  RIGHT TOTAL KNEE SCAR EXCISION       Anesthesia Type:  General    Note:     Postop Pain Control: Uneventful            Sign Out: Well controlled pain   PONV: No   Neuro/Psych: Uneventful            Sign Out: Acceptable/Baseline neuro status   Airway/Respiratory: Uneventful            Sign Out: Acceptable/Baseline resp. status   CV/Hemodynamics: Uneventful            Sign Out: Acceptable CV status; No obvious hypovolemia; No obvious fluid overload   Other NRE: NONE   DID A NON-ROUTINE EVENT OCCUR? No           Last vitals:  Vitals Value Taken Time   /66 07/06/22 1130   Temp 36.4  C (97.5  F) 07/06/22 1020   Pulse 68 07/06/22 1138   Resp 16 07/06/22 1100   SpO2 95 % 07/06/22 1138   Vitals shown include unvalidated device data.    Electronically Signed By: Tre Rosario MD  July 6, 2022  3:02 PM

## 2022-07-06 NOTE — INTERVAL H&P NOTE
"I have reviewed the surgical (or preoperative) H&P that is linked to this encounter, and examined the patient. There are no significant changes    Clinical Conditions Present on Arrival:  Clinically Significant Risk Factors Present on Admission                 # Coagulation Defect: home medication list includes an anticoagulant medication   # Overweight: Estimated body mass index is 27.9 kg/m  as calculated from the following:    Height as of this encounter: 1.854 m (6' 1\").    Weight as of this encounter: 95.9 kg (211 lb 8 oz).       "

## 2022-07-06 NOTE — ANESTHESIA PROCEDURE NOTES
Adductor canal Procedure Note    Pre-Procedure   Staff -        Anesthesiologist:  Tre Rosario MD       Performed By: anesthesiologist       Location: pre-op       Procedure Start/Stop Times: 7/6/2022 6:52 AM and 7/6/2022 6:54 AM       Pre-Anesthestic Checklist: patient identified, IV checked, site marked, risks and benefits discussed, informed consent, monitors and equipment checked, pre-op evaluation, at physician/surgeon's request and post-op pain management  Timeout:       Correct Patient: Yes        Correct Procedure: Yes        Correct Site: Yes        Correct Position: Yes        Correct Laterality: Yes        Site Marked: Yes  Procedure Documentation  Procedure: Adductor canal       Laterality: right       Patient Position: supine       Patient Prep/Sterile Barriers: sterile gloves, mask       Skin prep: Chloraprep       Needle Type: short bevel       Needle Gauge: 20.        Needle Length (Inches): 4        Ultrasound guided       1. Ultrasound was used to identify targeted nerve, plexus, vascular marker, or fascial plane and place a needle adjacent to it in real-time.       2. Ultrasound was used to visualize the spread of anesthetic in close proximity to the above referenced structure.       3. A permanent image is entered into the patient's record.       4. The visualized anatomic structures appeared normal.       5. There were no apparent abnormal pathologic findings.    Assessment/Narrative         The placement was negative for: blood aspirated, painful injection and site bleeding       Paresthesias: No.       Bolus given via needle..        Secured via.        Insertion/Infusion Method: Single Shot       Complications: none       Injection made incrementally with aspirations every 5 mL.    Medication(s) Administered   Medication Administration Time: 7/6/2022 6:52 AM

## 2022-07-06 NOTE — ANESTHESIA CARE TRANSFER NOTE
Patient: Harrison Barrientos    Procedure: Procedure(s):  RIGHT TOTAL KNEE SCAR EXCISION       Diagnosis: Knee pain, right [M25.561]  Scar of knee [L90.5]  Diagnosis Additional Information: No value filed.    Anesthesia Type:   General     Note:    Oropharynx: oropharynx clear of all foreign objects  Level of Consciousness: drowsy  Oxygen Supplementation: face mask  Level of Supplemental Oxygen (L/min / FiO2): 6  Independent Airway: airway patency satisfactory and stable  Dentition: dentition unchanged  Vital Signs Stable: post-procedure vital signs reviewed and stable  Report to RN Given: handoff report given  Patient transferred to: PACU    Handoff Report: Identifed the Patient, Identified the Reponsible Provider, Reviewed the pertinent medical history, Discussed the surgical course, Reviewed Intra-OP anesthesia mangement and issues during anesthesia, Set expectations for post-procedure period and Allowed opportunity for questions and acknowledgement of understanding      Vitals:  Vitals Value Taken Time   /75 07/06/22 0928   Temp 36.4  C (97.6  F) 07/06/22 0928   Pulse 67 07/06/22 0929   Resp 12 07/06/22 0929   SpO2 96 % 07/06/22 0929   Vitals shown include unvalidated device data.    Electronically Signed By: NAVNEET Oscar CRNA  July 6, 2022  9:30 AM

## 2022-07-06 NOTE — PLAN OF CARE
Patient vital signs are at baseline: Yes  Patient able to ambulate as they were prior to admission or with assist devices provided by therapies during their stay:  Yes    Patient MUST void prior to discharge:  No,  Reason:  No urge to void yet.   Patient able to tolerate oral intake:  Yes  Pain has adequate pain control using Oral analgesics:  Yes  Does patient have an identified :  Yes  Has goal D/C date and time been discussed with patient:  Yes  Jolene Hubbard RN

## 2022-07-06 NOTE — TREATMENT PLAN
"Orthopedic Surgery Pre-Op Plan: Harrison Barrientos  pre-op review. This is NOT an H&P   Surgeon: Dr. Estrada    [facility]: [facility]  Name of Surgery: Right Total Knee Scar Excision   Date of Surgery: 7/6/22  H&P: Completed on 6/29/22 by Dr. Brad Roldan at [facility].   History of ASA, NSAIDS, vitamin and/or herbal supplements within 10 days: No  History of blood thinners: Yes- on apixaban (Eliquis) for small mural thrombus in left ventricle. Ok per Cardiologist and PCP to hold Eliquis for 3 days before surgery (last dose taken 7/2/22, then held for surgery).     Plan:   1) Discharge Plan: Home POD 1 with assist of Spouse. Please see Discharge Planning section near bottom of this note for further details.     2) Intracardiac Thrombus: Most recent Cardiac MRI on 5/12/2022: \" very small area of mural thrombus involving the distal anteroseptal and apical septal wall segments...Compared to prior study on 12/2/2021, there remain a very small area of chronic mural thrombus over the distal anteroseptal wall which has decrease in size.  No change in ischemic cardiomyopathy or left ventricular systolic function. \"  On chronic anticoagulation with Eliquis due to small mural thrombus. Patient follows with Dr. Marline Ohara at [facility]. Reviewed recent notes from Dr. Ohara- ok to hold Eliquis for 3-5 days before knee surgery as directed by surgeon but it she recommends that Eliquis is resumed soon as safely possible after surgery. Follow up with Cardiology again in 1 year.     3) Coronary Artery Disease- S/P Anterior Myocardial Infarction with stent to LAD in 2004: Patient denies any chest pain/chest discomfort, palpitation, GIBBONS, PND, dizziness or syncope. Cleared by PCP and Cardiology for knee surgery.  Continues medical management of CAD with statin, carvedilol.     4) Ischemic Cardiomyopathy: LVEF improved to 41% on Cardiac MRI 5/12/2022. Cleared by Cardiologist, Dr. Ohara, " after review of updated MRI results.     5) Hyperlipidemia: on atorvastatin.     6) Hypertension: well-controlled on lisinopril and carvedilol. Instructed to hold lisinopril on the morning of surgery but should continue taking carvedilol.      7) Benign Prostatic Hyperplasia (BPH): No reports of obstructive urinary symptoms and not on tamsulosin. At increased risk for post-op urinary retention. Would recommend close monitoring for post-op urinary retention with frequent bladder scanning as needed.     Patient appears medically optimized for upcoming surgery. I would recommend Hospitalist Consult to assist with medical management. Please call me below with any questions on this patient.       Review of Systems Notable for: Intracardiac thrombus-on chronic anticoagulation with Eliquis, coronary artery disease-s/p MI with stent to LAD in 2004, ischemic cardiomyopathy-LVEF 41% on cardiac MRI 5/12/2022, hyperlipidemia, hypertension, BPH.    Past Medical History:   Past Medical History:   Diagnosis Date     Antiplatelet or antithrombotic long-term use      Arthritis      BPH (benign prostatic hyperplasia)      Coronary artery disease      Diverticulosis      Hyperlipidemia      Hypertension      Ischemic cardiomyopathy      Myocardial infarction (H) 2004     Prostate hyperplasia, benign localized, without urinary obstruction      Squamous cell carcinoma of skin      Stented coronary artery      Past Surgical History:   Procedure Laterality Date     ARTHROPLASTY KNEE Right 12/13/2021    Procedure: RIGHT TOTAL KNEE ARTHROPLASTY;  Surgeon: Magdi Estrada MD;  Location: [facility] OR     CORONARY STENT PLACEMENT       CYSTOSCOPY PROSTATE W/ LASER N/A 11/29/2016    Procedure: CYSTOSCOPY WITH TRANSURETHRAL RESECTION OF THE PROSTATE WITH QUANTA LASER;  Surgeon: Tre Cuadra MD;  [place] [facility]  Service:        Current Medications:  Patient's Medications   New Prescriptions    No medications on  file   Previous Medications    ACETAMINOPHEN (TYLENOL) 325 MG TABLET    Take 2 tablets (650 mg) by mouth every 4 hours as needed for other (mild pain)    ATORVASTATIN (LIPITOR) 40 MG TABLET    TAKE ONE TABLET BY MOUTH ONCE DAILY    CARVEDILOL (COREG) 12.5 MG TABLET    TAKE ONE TABLET BY MOUTH TWICE A DAY WITH MEALS    ELIQUIS ANTICOAGULANT 5 MG TABLET    TAKE ONE TABLET BY MOUTH TWICE A DAY    LISINOPRIL (ZESTRIL) 10 MG TABLET    TAKE 1 TABLET (10 MG TOTAL) BY MOUTH DAILY.    LORATADINE (CLARITIN) 10 MG TABLET    [LORATADINE (CLARITIN) 10 MG TABLET] Take 10 mg by mouth daily as needed.     SENNA-DOCUSATE (SENOKOT-S/PERICOLACE) 8.6-50 MG TABLET    Take 1-2 tablets by mouth 2 times daily Take while on oral narcotics to prevent or treat constipation.   Modified Medications    No medications on file   Discontinued Medications    No medications on file       ALLERGIES:  No Known Allergies    Social History  Social History     Tobacco Use     Smoking status: Never Smoker     Smokeless tobacco: Never Used   Substance Use Topics     Alcohol use: Yes     Comment: rare     Drug use: No       Any Abnormal Recent Diagnostics? No    Discharge Planning:   Discharge plan according to [facility]:        06/24/22 1103   Discharge Planning   Patient/Family Anticipates Transition to home with family;home   Living Arrangements   People in Home spouse   Type of Residence Private Residence   Is your private residence a single family home or apartment? Single family home   Number of Stairs, Within Home, Primary ten   Stair Railings, Within Home, Primary railings safe and in good condition   Once home, are you able to live on one level? Yes   Which level? Upper Level   Bathroom Shower/Tub Tub/Shower unit   Equipment Currently Used at Home raised toilet seat   Support System   Support Systems Spouse/Significant Other   Do you have someone available to stay with you one or two nights once you are home? Yes        Ruel Rosario  APRN, CNP   Advanced Practice Nurse Navigator- Orthopedics  [facility]   Phone: [phone]

## 2022-07-06 NOTE — CONSULTS
[facility] MEDICINE CONSULT NOTE   Physician requesting consult: Magdi Estrada MD    Reason for consult: Postoperative medical management of medical co-morbidities as below    Assessment and Plan    Harrison Barrientos is a 71 year old old male with a history of coronary disease, ischemic cardiomyopathy, and intramural thrombus presents for right total knee scar excision. [facility] service was asked to evaluate patient for postoperative medical management as follows below. Please resume the home medications as reconciled and further noted below with ordered hold parameters.  Vital signs have been stable post-operatively including hemodynamically stable blood pressure and heart rate. Thank you for this consult; we will continue to follow this patient until discharge.    Right total knee scar excision  Routine postoperative cares  PT and OT evaluation  Pain control    BPH with a history of urinary retention  Close monitoring  Straight cath as needed  Nursing staff made aware    Ischemic cardiomyopathy/chronic systolic congestive heart failure/intramural thrombus  Monitor volume status closely  Low-sodium diet  Resume Eliquis at 2100 7/6  Otherwise expectant management    Hyperlipidemia    Essential hypertension      Procedure(s):  RIGHT TOTAL KNEE SCAR EXCISION  Post-operative Day: Day of Surgery  Code status:Full Code     Type of Anesthesia   General    Estimated Blood Loss:  100 mL    Hospital Problem List   No problem-specific Assessment & Plan notes found for this encounter.    Active Problems:    Orthopedic aftercare      -Reviewed the patient's preoperative H and P and updated missing elements.  -Home medication reconciliation has been reviewed.  Medications have been ordered as noted from the home list and changes are documented above     HISTORY     Harrison Barrientos is a 71 year old old male with a history of coronary disease with recently discovered intramural thrombus and BPH with history  of urinary retention presents to the hospital for an elective scar excision.  Please see the H&P which was reviewed by me for preoperative course.  Please see the operative note for details of the surgery.  Role of hospital medicine discussed and questions answered.  Currently the patient is doing well.  Had minimal pain.  He is a bit worried about urinating after surgery.  He had general anesthesia because after his last spinal he had issues and had to have a [place] catheter placed which required outpatient voiding trial and follow-up.  He is not yet voided.  We did discuss there is a risk he will not be able to go.  He has coronary disease with a history of ischemic cardiomyopathy.  He has a stent in place which has been there for about 18 years.  Recently they discovered he had an intra mural thrombus.  He is on Eliquis for that.  He wants to restart it and I told him per surgery we can start tonight at 2100.  Not have any chest pain shortness of breath or heart failure symptoms.    Past Medical History     Patient Active Problem List    Diagnosis Date Noted     Orthopedic aftercare 07/06/2022     Priority: Medium     Arthritis of left knee 12/13/2021     Priority: Medium     Adenomatous polyp of sigmoid colon 11/09/2019     Priority: Medium     Ischemic cardiomyopathy      Priority: Medium     Created by Conversion         Hypercholesterolemia      Priority: Medium     Created by Conversion         BPH (benign prostatic hyperplasia)      Priority: Medium     Created by Conversion         Diverticulosis      Priority: Medium     Created by Conversion  Replacement Utility updated for latest IMO load         Coronary Artery Disease      Priority: Medium     Created by Conversion  Replacement Utility updated for latest IMO load         Benign Prostatic Hypertrophy With Urinary Obstruction      Priority: Medium     Created by Conversion         Squamous Cell Carcinoma Of The Skin      Priority: Medium     Created by  Conversion         Joint Pain, Localized In The Shoulder      Priority: Medium     Created by Conversion         Fatigue      Priority: Medium     Created by Conversion         Neck Pain      Priority: Medium     Created by Conversion         Serology Prostate-specific Antigen (PSA) Elevated      Priority: Medium     Created by Conversion            Surgical History   He  has a past surgical history that includes Coronary Stent Placement; Cystoscopy Prostate W/ Laser (N/A, 11/29/2016); and Arthroplasty knee (Right, 12/13/2021).     Past Surgical History:   Procedure Laterality Date     ARTHROPLASTY KNEE Right 12/13/2021    Procedure: RIGHT TOTAL KNEE ARTHROPLASTY;  Surgeon: Magdi Estrada MD;  [facility]: [facility]     CORONARY STENT PLACEMENT       CYSTOSCOPY PROSTATE W/ LASER N/A 11/29/2016    Procedure: CYSTOSCOPY WITH TRANSURETHRAL RESECTION OF THE PROSTATE WITH QUANTA LASER;  Surgeon: Tre Cuadra MD;  Location: [facility];  Service:        Family History    Reviewed, and family history includes Benign prostatic hyperplasia in his father, paternal grandfather, and paternal uncle; Heart Disease in his father and mother; Kidney Cancer in his father.    Social History    Reviewed, and he  reports that he has never smoked. He has never used smokeless tobacco. He reports current alcohol use. He reports that he does not use drugs.  Social History     Tobacco Use     Smoking status: Never Smoker     Smokeless tobacco: Never Used   Substance Use Topics     Alcohol use: Yes     Comment: rare       Allergies   No Known Allergies    Prior to Admission Medications      Medications Prior to Admission   Medication Sig Dispense Refill Last Dose     atorvastatin (LIPITOR) 40 MG tablet TAKE ONE TABLET BY MOUTH ONCE DAILY 90 tablet 3 7/5/2022 at Takes at bedtime     carvedilol (COREG) 12.5 MG tablet TAKE ONE TABLET BY MOUTH TWICE A DAY WITH MEALS 180 tablet 1 7/6/2022 at 0400     ELIQUIS ANTICOAGULANT  5 MG tablet TAKE ONE TABLET BY MOUTH TWICE A  tablet 1 7/2/2022 at AM     lisinopril (ZESTRIL) 10 MG tablet TAKE 1 TABLET (10 MG TOTAL) BY MOUTH DAILY. 90 tablet 1 7/5/2022 at Takes at bedtime     loratadine (CLARITIN) 10 mg tablet [LORATADINE (CLARITIN) 10 MG TABLET] Take 10 mg by mouth daily as needed.           Review of Systems   A 12 point comprehensive review of systems was negative except as noted above.    OBJECTIVE         Physical Exam   Temp:  [97.5  F (36.4  C)-98  F (36.7  C)] 98  F (36.7  C)  Pulse:  [55-68] 58  Resp:  [11-18] 16  BP: (127-152)/(63-85) 150/74  SpO2:  [90 %-99 %] 92 %  Body mass index is 27.9 kg/m .  GENERAL:  Alert, appears comfortable, in no acute distress, appears stated age   HEAD:  Normocephalic, without obvious abnormality, atraumatic   EYES:  PERRL, conjunctiva/corneas clear, no scleral icterus, EOM's intact   THROAT: Lips, mucosa, and tongue normal; teeth and gums normal, mouth moist   NECK: Supple, symmetrical, trachea midline   BACK:   Symmetric, no curvature, ROM normal   LUNGS:   Clear to auscultation bilaterally, no rales, rhonchi, or wheezing, symmetric chest rise on inhalation, respirations unlabored   CHEST WALL:  No tenderness or deformity   HEART:  Regular rate and rhythm, S1 and S2 normal, no murmur, rub, or gallop    ABDOMEN:   Soft, non-tender, bowel sounds active all four quadrants, no masses, no organomegaly, no rebound or guarding   EXTREMITIES: Extremities normal, atraumatic, no cyanosis or edema    SKIN: Dry to touch, no exanthems in the visualized areas   NEURO: Alert, oriented x 4, moves all four extremities freely/spontaneously   PSYCH: Cooperative, behavior is appropriate          Cardiographics Reviewed Personally By Myself   EKG Results: personally reviewed.   Cardio MRI: Reviewed with EF of 41% and decrease in size of known intramural thrombus    Imaging Reviewed Personally By Myself    Radiology Results:   Recent Results (from the past 24  "hour(s))   POC US Guidance Needle Placement    Narrative    Ultrasound was performed as guidance to an anesthesia procedure.  Click   \"PACS images\" hyperlink below to view any stored images.  For specific   procedure details, view procedure note authored by anesthesia.   XR Knee Port Right 1/2 Views    Narrative    EXAM: XR KNEE PORTABLE RIGHT 1/2 VIEWS  LOCATION: [facility]  DATE/TIME: 07/06/2022, 9:47 AM    INDICATION: Postop total knee.  COMPARISON: None.      Impression    IMPRESSION: Postoperative change right total knee arthroplastic. The components appear well seated. Post procedural air within the joint and surrounding soft tissues.           Labs Reviewed Personally By Myself     Results for orders placed or performed during the hospital encounter of 07/06/22 (from the past 24 hour(s))   POC US Guidance Needle Placement    Narrative    Ultrasound was performed as guidance to an anesthesia procedure.  Click   \"PACS images\" hyperlink below to view any stored images.  For specific   procedure details, view procedure note authored by anesthesia.   Gram Stain    Specimen: Knee, Right; Tissue   Result Value Ref Range    Gram Stain Result No organisms seen     Gram Stain Result No white blood cells seen    XR Knee Port Right 1/2 Views    Narrative    EXAM: XR KNEE PORTABLE RIGHT 1/2 VIEWS  LOCATION: [facility]  DATE/TIME: 07/06/2022, 9:47 AM    INDICATION: Postop total knee.  COMPARISON: None.      Impression    IMPRESSION: Postoperative change right total knee arthroplastic. The components appear well seated. Post procedural air within the joint and surrounding soft tissues.           Preoperative Labs Reviewed Personally By Myself   Sodium 142 potassium 4.7 chloride 106 bicarb 28 BUN 18 creatinine 0.94 glucose 103 White count 5.8 hemoglobin 14.8 MCV 89 platelets 186      Thank you for this consultation.  Appreciate the opportunity to participate in the care of " Harrison Barrientos, please feel free to contact us for any questions or concerns.    Abebe Hernandez MD  [facility]  Phone: #[phone]

## 2022-07-06 NOTE — PHARMACY-ADMISSION MEDICATION HISTORY
Pharmacy Note - Admission Medication History    Pertinent Provider Information:    ______________________________________________________________________    Prior To Admission (PTA) med list completed and updated in EMR.       PTA Med List   Medication Sig Note Last Dose     atorvastatin (LIPITOR) 40 MG tablet TAKE ONE TABLET BY MOUTH ONCE DAILY  7/5/2022 at Takes at bedtime     carvedilol (COREG) 12.5 MG tablet TAKE ONE TABLET BY MOUTH TWICE A DAY WITH MEALS  7/6/2022 at 0400     ELIQUIS ANTICOAGULANT 5 MG tablet TAKE ONE TABLET BY MOUTH TWICE A DAY 7/6/2022: . 7/2/2022 at AM     lisinopril (ZESTRIL) 10 MG tablet TAKE 1 TABLET (10 MG TOTAL) BY MOUTH DAILY.  7/5/2022 at Takes at bedtime     loratadine (CLARITIN) 10 mg tablet [LORATADINE (CLARITIN) 10 MG TABLET] Take 10 mg by mouth daily as needed.          Information source(s): Patient, Clinic records and [facility]    Method of interview communication: in-person    Patient was asked about OTC/herbal products specifically.  PTA med list reflects this.    Based on the pharmacist's assessment, the PTA med list information appears reliable    Allergies were reviewed, assessed, and updated with the patient.      Patient does not use any multi-dose medications prior to admission.     Thank you for the opportunity to participate in the care of this patient.      Krystle Rosario RPH     7/6/2022     6:56 AM

## 2022-07-06 NOTE — ANESTHESIA PREPROCEDURE EVALUATION
Anesthesia Pre-Procedure Evaluation    Patient: Harrison Barrientos   MRN: [id number] : 1950        Procedure : Procedure(s):  RIGHT TOTAL KNEE SCAR EXCISION          Past Medical History:   Diagnosis Date     Antiplatelet or antithrombotic long-term use      Arthritis      BPH (benign prostatic hyperplasia)      Coronary artery disease      Diverticulosis      Hyperlipidemia      Hypertension      Ischemic cardiomyopathy      Myocardial infarction (H)      Prostate hyperplasia, benign localized, without urinary obstruction      Squamous cell carcinoma of skin      Stented coronary artery       Past Surgical History:   Procedure Laterality Date     ARTHROPLASTY KNEE Right 2021    Procedure: RIGHT TOTAL KNEE ARTHROPLASTY;  Surgeon: Magdi Estrada MD;  Location: [facility]     CORONARY STENT PLACEMENT       CYSTOSCOPY PROSTATE W/ LASER N/A 2016    Procedure: CYSTOSCOPY WITH TRANSURETHRAL RESECTION OF THE PROSTATE WITH QUANTA LASER;  Surgeon: Tre Cuadra MD;  [place] [facility];  Service:       No Known Allergies   Social History     Tobacco Use     Smoking status: Never Smoker     Smokeless tobacco: Never Used   Substance Use Topics     Alcohol use: Yes     Comment: rare      Wt Readings from Last 1 Encounters:   22 95.9 kg (211 lb 8 oz)        Anesthesia Evaluation   Pt has had prior anesthetic.     No history of anesthetic complications       ROS/MED HX  ENT/Pulmonary:  - neg pulmonary ROS     Neurologic:  - neg neurologic ROS     Cardiovascular:     (+) Dyslipidemia hypertension--CAD ---Taking blood thinners (off Eliquis for 3 days) CHF (ischemic cardiomyopathy, systolic, EF ~40%)     METS/Exercise Tolerance:     Hematologic:  - neg hematologic  ROS     Musculoskeletal:   (+) arthritis,     GI/Hepatic:  - neg GI/hepatic ROS     Renal/Genitourinary:  - neg Renal ROS     Endo:  - neg endo ROS     Psychiatric/Substance Use:       Infectious Disease:        Malignancy:       Other:            Physical Exam    Airway        Mallampati: II   TM distance: > 3 FB   Neck ROM: full   Mouth opening: > 3 cm    Respiratory Devices and Support         Dental  no notable dental history         Cardiovascular   cardiovascular exam normal          Pulmonary   pulmonary exam normal                OUTSIDE LABS:  CBC:   Lab Results   Component Value Date    WBC 5.8 06/29/2022    WBC 5.9 03/02/2022    HGB 14.8 06/29/2022    HGB 14.5 03/02/2022    HCT 45.0 06/29/2022    HCT 45.3 03/02/2022     06/29/2022     03/02/2022     BMP:   Lab Results   Component Value Date     06/29/2022     03/02/2022    POTASSIUM 4.7 06/29/2022    POTASSIUM 4.4 03/02/2022    CHLORIDE 106 06/29/2022    CHLORIDE 104 03/02/2022    CO2 25 06/29/2022    CO2 28 03/02/2022    BUN 18.2 06/29/2022    BUN 17 03/02/2022    CR 0.94 06/29/2022    CR 0.80 03/02/2022     (H) 06/29/2022    GLC 93 03/02/2022     COAGS:   Lab Results   Component Value Date    PTT 23 05/19/2004    INR 1.06 12/10/2021     POC: No results found for: BGM, HCG, HCGS  HEPATIC:   Lab Results   Component Value Date    ALBUMIN 3.2 (L) 12/14/2021    PROTTOTAL 5.9 (L) 12/14/2021    ALT 19 12/14/2021    AST 16 12/14/2021    ALKPHOS 52 12/14/2021    BILITOTAL 0.7 12/14/2021     OTHER:   Lab Results   Component Value Date    RENE 9.6 06/29/2022    MAG 2.4 12/14/2021       Anesthesia Plan    ASA Status:  3   NPO Status:  NPO Appropriate    Anesthesia Type: General.     - Airway: LMA   Induction: Intravenous.           Consents    Anesthesia Plan(s) and associated risks, benefits, and realistic alternatives discussed. Questions answered and patient/representative(s) expressed understanding.     - Discussed: Risks, Benefits and Alternatives for the PROCEDURE were discussed     - Discussed with:  Patient         Postoperative Care    Pain management: IV analgesics, Oral pain medications, Peripheral nerve block (Single Shot).    PONV prophylaxis: Ondansetron (or other 5HT-3), Dexamethasone or Solumedrol     Comments:                Tre Rosario MD

## 2022-07-07 ENCOUNTER — APPOINTMENT (OUTPATIENT)
Dept: PHYSICAL THERAPY | Facility: CLINIC | Age: 72
End: 2022-07-07
Attending: PHYSICIAN ASSISTANT
Payer: COMMERCIAL

## 2022-07-07 ENCOUNTER — APPOINTMENT (OUTPATIENT)
Dept: OCCUPATIONAL THERAPY | Facility: CLINIC | Age: 72
End: 2022-07-07
Attending: PHYSICIAN ASSISTANT
Payer: COMMERCIAL

## 2022-07-07 VITALS
OXYGEN SATURATION: 96 % | SYSTOLIC BLOOD PRESSURE: 113 MMHG | WEIGHT: 211.5 LBS | HEIGHT: 73 IN | RESPIRATION RATE: 16 BRPM | BODY MASS INDEX: 28.03 KG/M2 | HEART RATE: 56 BPM | TEMPERATURE: 97.4 F | DIASTOLIC BLOOD PRESSURE: 55 MMHG

## 2022-07-07 PROBLEM — M17.12 ARTHRITIS OF LEFT KNEE: Status: ACTIVE | Noted: 2021-12-13

## 2022-07-07 LAB
ANION GAP SERPL CALCULATED.3IONS-SCNC: 10 MMOL/L (ref 5–18)
BUN SERPL-MCNC: 18 MG/DL (ref 8–22)
CALCIUM SERPL-MCNC: 9 MG/DL (ref 8.5–10.5)
CHLORIDE BLD-SCNC: 103 MMOL/L (ref 98–107)
CO2 SERPL-SCNC: 25 MMOL/L (ref 22–31)
CREAT SERPL-MCNC: 0.89 MG/DL (ref 0.7–1.3)
GFR SERPL CREATININE-BSD FRML MDRD: >90 ML/MIN/1.73M2
GLUCOSE BLD-MCNC: 158 MG/DL (ref 70–125)
HGB BLD-MCNC: 14.1 G/DL (ref 13.3–17.7)
POTASSIUM BLD-SCNC: 4.6 MMOL/L (ref 3.5–5)
SODIUM SERPL-SCNC: 138 MMOL/L (ref 136–145)

## 2022-07-07 PROCEDURE — 99232 SBSQ HOSP IP/OBS MODERATE 35: CPT | Performed by: FAMILY MEDICINE

## 2022-07-07 PROCEDURE — 250N000013 HC RX MED GY IP 250 OP 250 PS 637: Performed by: PHYSICIAN ASSISTANT

## 2022-07-07 PROCEDURE — 97535 SELF CARE MNGMENT TRAINING: CPT | Mod: GO

## 2022-07-07 PROCEDURE — 250N000013 HC RX MED GY IP 250 OP 250 PS 637: Performed by: FAMILY MEDICINE

## 2022-07-07 PROCEDURE — 97116 GAIT TRAINING THERAPY: CPT | Mod: GP

## 2022-07-07 PROCEDURE — 97110 THERAPEUTIC EXERCISES: CPT | Mod: GP

## 2022-07-07 PROCEDURE — 80048 BASIC METABOLIC PNL TOTAL CA: CPT | Performed by: FAMILY MEDICINE

## 2022-07-07 PROCEDURE — 97161 PT EVAL LOW COMPLEX 20 MIN: CPT | Mod: GP

## 2022-07-07 PROCEDURE — 85018 HEMOGLOBIN: CPT | Performed by: PHYSICIAN ASSISTANT

## 2022-07-07 PROCEDURE — 36415 COLL VENOUS BLD VENIPUNCTURE: CPT | Performed by: FAMILY MEDICINE

## 2022-07-07 PROCEDURE — 97166 OT EVAL MOD COMPLEX 45 MIN: CPT | Mod: GO

## 2022-07-07 RX ORDER — POLYETHYLENE GLYCOL 3350 17 G/17G
17 POWDER, FOR SOLUTION ORAL DAILY PRN
Qty: 510 G | COMMUNITY
Start: 2022-07-07 | End: 2022-07-28

## 2022-07-07 RX ORDER — CEFADROXIL 500 MG/1
500 CAPSULE ORAL 2 TIMES DAILY
Qty: 14 CAPSULE | Refills: 0 | Status: SHIPPED | OUTPATIENT
Start: 2022-07-07 | End: 2022-10-24

## 2022-07-07 RX ORDER — ACETAMINOPHEN 325 MG/1
650 TABLET ORAL EVERY 4 HOURS PRN
Qty: 60 TABLET | Refills: 0 | COMMUNITY
Start: 2022-07-09 | End: 2023-05-08

## 2022-07-07 RX ORDER — AMOXICILLIN 250 MG
1 CAPSULE ORAL 2 TIMES DAILY
Qty: 20 TABLET | Refills: 0 | COMMUNITY
Start: 2022-07-07 | End: 2022-07-17

## 2022-07-07 RX ORDER — OXYCODONE HYDROCHLORIDE 5 MG/1
5 TABLET ORAL EVERY 4 HOURS PRN
Qty: 30 TABLET | Refills: 0 | Status: SHIPPED | OUTPATIENT
Start: 2022-07-07 | End: 2022-10-24

## 2022-07-07 RX ADMIN — OXYCODONE HYDROCHLORIDE 5 MG: 5 TABLET ORAL at 06:45

## 2022-07-07 RX ADMIN — OXYCODONE HYDROCHLORIDE 5 MG: 5 TABLET ORAL at 11:17

## 2022-07-07 RX ADMIN — APIXABAN 5 MG: 5 TABLET, FILM COATED ORAL at 08:16

## 2022-07-07 RX ADMIN — HYDROXYZINE HYDROCHLORIDE 10 MG: 10 TABLET ORAL at 06:45

## 2022-07-07 RX ADMIN — ACETAMINOPHEN 975 MG: 325 TABLET ORAL at 11:17

## 2022-07-07 RX ADMIN — ACETAMINOPHEN 975 MG: 325 TABLET ORAL at 04:03

## 2022-07-07 RX ADMIN — POLYETHYLENE GLYCOL 3350 17 G: 17 POWDER, FOR SOLUTION ORAL at 08:16

## 2022-07-07 RX ADMIN — SENNOSIDES AND DOCUSATE SODIUM 1 TABLET: 50; 8.6 TABLET ORAL at 08:16

## 2022-07-07 ASSESSMENT — ACTIVITIES OF DAILY LIVING (ADL)
ADLS_ACUITY_SCORE: 22
ADLS_ACUITY_SCORE: 22
ADLS_ACUITY_SCORE: 24
ADLS_ACUITY_SCORE: 24
ADLS_ACUITY_SCORE: 22
ADLS_ACUITY_SCORE: 24

## 2022-07-07 NOTE — PROGRESS NOTES
Discharge AVS reviewed with patient and wife.  Questions answered and teachings acknowledged.  Belongings and paperwork sent with via wife transport

## 2022-07-07 NOTE — PLAN OF CARE
Patient ready for discharge. Discharge instructions discussed and questions answered. All belongings sent with patient.   Jolene Hubbard RN

## 2022-07-07 NOTE — PROGRESS NOTES
07/07/22 0906   Quick Adds   Type of Visit Initial Occupational Therapy Evaluation   Living Environment   People in Home spouse   Current Living Arrangements house   Home Accessibility stairs to enter home;stairs within home   Transportation Anticipated family or friend will provide   Living Environment Comments Split level home w/ all needs on top floor. No ADs. Pt has walkin shower w/ grab bars and elevated toilets   Self-Care   Usual Activity Tolerance good   Current Activity Tolerance good   Equipment Currently Used at Home grab bar, tub/shower;raised toilet seat   Activity/Exercise/Self-Care Comment Pt IND w/ ADLs and IADLs at baseline   General Information   Onset of Illness/Injury or Date of Surgery 07/06/22   Referring Physician Magdi Estrada MD   Additional Occupational Profile Info/Pertinent History of Current Problem R knee scar incision   Existing Precautions/Restrictions no known precautions/restrictions   Left Lower Extremity (Weight-bearing Status) full weight-bearing (FWB)   Right Lower Extremity (Weight-bearing Status) weight-bearing as tolerated (WBAT)   Cognitive Status Examination   Orientation Status orientation to person, place and time   Visual Perception   Visual Impairment/Limitations corrective lenses full-time   Sensory   Sensory Quick Adds No deficits were identified   Pain Assessment   Patient Currently in Pain No   Integumentary/Edema   Integumentary/Edema no deficits were identifed   Posture   Posture not impaired   Range of Motion Comprehensive   General Range of Motion no range of motion deficits identified   Strength Comprehensive (MMT)   General Manual Muscle Testing (MMT) Assessment no strength deficits identified   Muscle Tone Assessment   Muscle Tone Quick Adds No deficits were identified   Coordination   Upper Extremity Coordination No deficits were identified   Bed Mobility   Bed Mobility No deficits identified   Transfers   Transfers bed-chair transfer;sit-stand  transfer;toilet transfer;shower transfer   Transfer Comments SBA for all transfers   Activities of Daily Living   BADL Assessment/Intervention lower body dressing;toileting   Lower Body Dressing Assessment/Training   [place] Level (Lower Body Dressing) minimum assist (75% patient effort)   Toileting   [place] Level (Toileting) supervision   Clinical Impression   Criteria for Skilled Therapeutic Interventions Met (OT) Yes, treatment indicated   OT Diagnosis decreased IND   Influenced by the following impairments R knee scar incision   OT Problem List-Impairments impacting ADL activity tolerance impaired;mobility;range of motion (ROM)   Assessment of Occupational Performance 3-5 Performance Deficits   Identified Performance Deficits LE dressing, transfes   Planned Therapy Interventions (OT) ADL retraining;bed mobility training;transfer training   Clinical Decision Making Complexity (OT) moderate complexity   Risk & Benefits of therapy have been explained evaluation/treatment results reviewed;patient   OT Discharge Planning   OT Discharge Recommendation (DC Rec) (S)  home with assist   OT Rationale for DC Rec Pt tolerating therapy well. Good set up at home and will have wife to assist as needed for ADLs   Total Evaluation Time (Minutes)   Total Evaluation Time (Minutes) 10   OT Goals   Therapy Frequency (OT) One time eval and treatment   OT Predicted Duration/Target Date for Goal Attainment 07/07/22   OT Goals Lower Body Dressing;Bed Mobility;Transfers   OT: Lower Body Dressing Modified independent;using adaptive equipment;Goal Met;Completed   OT: Bed Mobility Modified independent;supine to/from sitting;rolling;bridging;Goal Met;Completed   OT: Transfer Modified independent;with assistive device;Goal Met;Completed  (walkin shower)

## 2022-07-07 NOTE — PLAN OF CARE
"Goal Outcome Evaluation:        Problem: Adjustment to Surgery (Knee Arthroplasty)  Goal: Optimal Coping  Outcome: Ongoing, Progressing        BP (!) 156/74 (BP Location: Right arm)   Pulse 62   Temp 97.5  F (36.4  C) (Oral)   Resp 18   Ht 1.854 m (6' 1\")   Wt 95.9 kg (211 lb 8 oz)   SpO2 96%   BMI 27.90 kg/m    Patient vital signs are at baseline: Yes  Patient able to ambulate as they were prior to admission or with assist devices provided by therapies during their stay:  Yes  Patient MUST void prior to discharge:  Yes  Patient able to tolerate oral intake:  Yes  Pain has adequate pain control using Oral analgesics:  Yes  Does patient have an identified :  Yes  Has goal D/C date and time been discussed with patient:  Yes       Michael Joe RN        "

## 2022-07-07 NOTE — UTILIZATION REVIEW
Admission Status; Secondary Review Determination       Under the authority of the Utilization Management Committee, the utilization review process indicated a secondary review on the above patient. The review outcome is based on review of the medical records, discussions with staff, and applying clinical experience noted on the date of the review.     (x) Outpatient Status with extended recovery is appropriate - This patient does not meet hospital inpatient criteria. If this patient's primary payer is Medicare and was admitted as an inpatient, Condition Code 44 should be used and patient status changed to outpatient recovery.    RATIONALE FOR DETERMINATION     Harrison Barrientos is a 72 yo male with a PMH of BPH who underwent a right total knee scar excision on 7/6/22 . Patient was admitted to the hospital after the procedure. Patient has Medicare and the procedure is not on the CMS inpatient list. No documented complications or unexpected recovery.  He will be discharging home later today.     Patient can be safely monitored for bleeding and recover in outpatient/extended recovery setting.   The severity of illness, intensity of service provided, expected LOS and risk for adverse outcome doesn't meet inpatient hospital admission.     The information on this document is developed by the utilization review team in order for the business office to ensure compliance. This only denotes the appropriateness of proper admission status and does not reflect the quality of care rendered.   The definitions of Inpatient Status and Observation Status used in making the determination above are those provided in the CMS Coverage Manual, Chapter 1 and Chapter 6, section 70.4.       Sincerely,       Latisha Traore, DO  Utilization Review  Physician Advisor  [facility].

## 2022-07-07 NOTE — PLAN OF CARE
Occupational Therapy Discharge Summary    Reason for therapy discharge:    All goals and outcomes met, no further needs identified.    Progress towards therapy goal(s). See goals on Care Plan in [facility] electronic health record for goal details.  Goals met    Therapy recommendation(s):    No further therapy is recommended.

## 2022-07-07 NOTE — PLAN OF CARE
Goal Outcome Evaluation:    Patient vital signs are at baseline: Yes  Patient able to ambulate as they were prior to admission or with assist devices provided by therapies during their stay:  Yes  Patient MUST void prior to discharge:  Yes  Patient able to tolerate oral intake:  Yes  Pain has adequate pain control using Oral analgesics:  Yes    Does patient have an identified :  Yes  Has goal D/C date and time been discussed with patient:  Yes. Discharge plan for 7/7/2022 to home 11AM.

## 2022-07-07 NOTE — PROGRESS NOTES
07/07/22 0830   Quick Adds   Type of Visit Initial PT Evaluation   Living Environment   People in Home spouse   Current Living Arrangements house   Home Accessibility stairs to enter home;stairs within home   Stair Railings, Main Entrance railings safe and in good condition   Number of Stairs, Within Home, Primary ten   Stair Railings, Within Home, Primary railings safe and in good condition   Self-Care   Equipment Currently Used at Home none   Activity/Exercise/Self-Care Comment Pt reports that he has walker and cane at home   General Information   Onset of Illness/Injury or Date of Surgery 07/06/22   Referring Physician Magdi Estrada MD   Patient/Family Therapy Goals Statement (PT) to get better   Pertinent History of Current Problem (include personal factors and/or comorbidities that impact the POC) R total knee scar excision   Existing Precautions/Restrictions weight bearing   Weight-Bearing Status - LLE full weight-bearing   Weight-Bearing Status - RLE weight-bearing as tolerated   Transfers   Transfers sit-stand transfer   Comment, (Transfers) Mod I with FWW for sit<>stand transfers. Verbal cues for safety.   Sit-Stand Transfer   Sit-Stand Garden (Transfers) modified independence;verbal cues   Assistive Device (Sit-Stand Transfers) walker, front-wheeled   Comment, (Sit-Stand Transfer) Mod I with FWW for sit<>stand transfers. Verbal cues for safety.   Gait/Stairs (Locomotion)   Garden Level (Gait) supervision;verbal cues   Assistive Device (Gait) walker, front-wheeled   Distance in Feet (Required for LE Total Joints) 10'   Pattern (Gait) step-through   Deviations/Abnormal Patterns (Gait) base of support, wide;reji decreased;gait speed decreased   Maintains Weight-bearing Status (Gait) able to maintain   Negotiation (Stairs) stairs independence;handrail location;number of steps;ascending technique;descending technique   Garden Level (Stairs) supervision;verbal cues   Handrail Location  (Stairs) both sides   Number of Steps (Stairs) 4 steps   Ascending Technique (Stairs) step-to-step   Descending Technique (Stairs) step-to-step   Comment, (Gait/Stairs) Pt ambulates with SBA and FWW in hallway. Verbal cues for safety. Pt negotiates 4 steps with SBA and bilateral railings. Verbal cues for safety. Pt demonstrates understanding of correct step technique/pattern.   Clinical Impression   Criteria for Skilled Therapeutic Intervention Yes, treatment indicated   PT Diagnosis (PT) impaired functional mobility   Influenced by the following impairments weakness, pain   Functional limitations due to impairments transfers, ambulation, stairs   Clinical Presentation (PT Evaluation Complexity) Stable/Uncomplicated   Clinical Presentation Rationale Pt presents as medically diagnosed   Clinical Decision Making (Complexity) low complexity   Planned Therapy Interventions (PT) bed mobility training;gait training;home exercise program;patient/family education;ROM (range of motion);stair training;strengthening;stretching;transfer training   Anticipated Equipment Needs at Discharge (PT) walker, rolling   Risk & Benefits of therapy have been explained patient   PT Discharge Planning   PT Discharge Recommendation (DC Rec) (S)  home with outpatient physical therapy   PT Rationale for DC Rec Pt reports good home setup and good home support.   Plan of Care Review   Plan of Care Reviewed With patient   Total Evaluation Time   Total Evaluation Time (Minutes) 10   Physical Therapy Goals   PT Frequency Daily   PT Predicted Duration/Target Date for Goal Attainment 07/09/22   PT Goals Transfers;Gait;Stairs   PT: Transfers Modified independent;Sit to/from stand;Assistive device;Goal Met   PT: Gait Supervision/stand-by assist;Rolling walker;Greater than 200 feet;Goal Met   PT: Stairs Supervision/stand-by assist;8 stairs;Rail on both sides;Goal Met     Nikia Jackson, PT, DPT

## 2022-07-07 NOTE — PLAN OF CARE
Patient vital signs are at baseline: Yes  Patient able to ambulate as they were prior to admission or with assist devices provided by therapies during their stay:  Yes  Patient MUST void prior to discharge:  Yes  Patient able to tolerate oral intake:  Yes  Pain has adequate pain control using Oral analgesics:  Yes  Does patient have an identified :  Yes  Has goal D/C date and time been discussed with patient:  Yes  Jolene Hubbard RN

## 2022-07-07 NOTE — PLAN OF CARE
Physical Therapy Discharge Summary    Reason for therapy discharge:    Discharged to home with outpatient therapy.    Progress towards therapy goal(s). See goals on Care Plan in [facility] electronic health record for goal details.  Goals met    Therapy recommendation(s):    Continued therapy is recommended.  Rationale/Recommendations:  Pt reports planning on completing outpatient PT starting next week.  Continue home exercise program.

## 2022-07-07 NOTE — PROGRESS NOTES
[facility] MEDICINE PROGRESS NOTE      Identification/Summary:   71-year-old with a history of BPH and history of urinary retention presents for right total knee scar excision.  Doing well postoperative day #1.      Assessment and Plan:  Right total knee scar excision  Routine postoperative cares  PT and OT evaluation  Pain control     BPH with a history of urinary retention  Close monitoring  Patient has voided multiple times and feels like he is at his baseline  He will continue to monitor for retention at home  Outpatient follow-up     Ischemic cardiomyopathy/chronic systolic congestive heart failure/intramural thrombus  Monitor volume status closely  Low-sodium diet  Resume Eliquis at 2100 7/6  Otherwise expectant management     Hyperlipidemia     Essential hypertension  Excellent control    Diet: Combination Diet 2 gm NA Diet  DVT Prophylaxis:  Defer to orthopedics  Code Status: Full Code    Anticipated possible discharge 7/7 likely  Disposition Plan      Expected Discharge Date: 07/07/2022                The patient's care was discussed with the Bedside Nurse, Patient and Therapy.    Abebe Hernandez MD  [facility] Medicine  [facility]  Phone: #[phone]    Interval History/Subjective:  Patient ready to go from his standpoint.  Eating without issues.  Urinating.  Passing flatus.  No BM yet.  Pain controlled.  Working with therapy.  Medically cleared to discharge if cleared from surgical and PT/OT standpoint    Physical Exam/Objective:  Temp:  [97.4  F (36.3  C)-98  F (36.7  C)] 97.4  F (36.3  C)  Pulse:  [56-68] 56  Resp:  [11-18] 16  BP: (113-156)/(55-77) 113/55  SpO2:  [90 %-98 %] 96 %  Body mass index is 27.9 kg/m .    GENERAL:  Alert, appears comfortable, in no acute distress, appears stated age   HEAD:  Normocephalic, without obvious abnormality, atraumatic   EYES:  PERRL, conjunctiva/corneas clear, no scleral icterus, EOM's intact    NOSE: Nares normal, septum midline, mucosa normal, no drainage   NECK: Supple, symmetrical, trachea midline   BACK:   Symmetric, no curvature, ROM normal   LUNGS:   Clear to auscultation bilaterally, no rales, rhonchi, or wheezing, symmetric chest rise on inhalation, respirations unlabored   HEART:  Regular rate and rhythm, S1 and S2 normal, no murmur, rub, or gallop    ABDOMEN:   Soft, non-tender, bowel sounds active all four quadrants, no masses, no organomegaly, no rebound or guarding   EXTREMITIES: Extremities normal, atraumatic, no cyanosis or edema    SKIN: Dry to touch, no exanthems in the visualized areas   NEURO: Alert, oriented x3, moves all four extremities freely   PSYCH: Cooperative, behavior is appropriate      Data reviewed today: I personally reviewed all new medications, labs, imaging/diagnostics reports over the past 24 hours. Pertinent findings include:    Imaging:   Recent Results (from the past 24 hour(s))   XR Knee Port Right 1/2 Views    Narrative    EXAM: XR KNEE PORTABLE RIGHT 1/2 VIEWS  LOCATION: [facility]  DATE/TIME: 07/06/2022, 9:47 AM    INDICATION: Postop total knee.  COMPARISON: None.      Impression    IMPRESSION: Postoperative change right total knee arthroplastic. The components appear well seated. Post procedural air within the joint and surrounding soft tissues.           Labs:  Most Recent 3 CBC's:Recent Labs   Lab Test 07/07/22  0744 06/29/22  1210 03/02/22  1621 12/14/21  1030   WBC  --  5.8 5.9 20.4*   HGB 14.1 14.8 14.5 14.5   MCV  --  89 90 87   PLT  --  186 248 201     Most Recent 3 BMP's:Recent Labs   Lab Test 07/07/22  0744 06/29/22  1210 03/02/22  1621    142 142   POTASSIUM 4.6 4.7 4.4   CHLORIDE 103 106 104   CO2 25 25 28   BUN 18 18.2 17   CR 0.89 0.94 0.80   ANIONGAP 10 11 10   RENE 9.0 9.6 9.6   * 103* 93     Most Recent 2 LFT's:Recent Labs   Lab Test 12/14/21  1030 12/14/20  1558   AST 16 18   ALT 19 25   ALKPHOS 52 60    BILITOTAL 0.7 0.9     Most Recent 3 INR's:Recent Labs   Lab Test 12/10/21  1124   INR 1.06       Medications:   Personally Reviewed.  Medications     lactated ringers 50 mL/hr at 07/06/22 1244       acetaminophen  975 mg Oral Q8H     apixaban ANTICOAGULANT  5 mg Oral BID     atorvastatin  40 mg Oral QPM     carvedilol  12.5 mg Oral BID w/meals     lisinopril  10 mg Oral QPM     polyethylene glycol  17 g Oral Daily     senna-docusate  1 tablet Oral BID     sodium chloride (PF)  3 mL Intracatheter Q8H

## 2022-07-07 NOTE — DISCHARGE SUMMARY
"ORTHOPEDIC DISCHARGE SUMMARY       Harrison Barrientos,  1950, MRN [id number]    Admission Date: 2022      Admission Diagnoses: Knee pain, right [M25.561]  Scar of knee [L90.5]  Orthopedic aftercare [Z47.89]     Discharge Date:  2022    Post-operative Day:  1 Day Post-Op    Reason for Admission: The patient was admitted for the following: Procedure(s):  RIGHT TOTAL KNEE SCAR EXCISION    BRIEF HOSPITAL COURSE   Harrison Barrientos is a pleasant 71 year old male who underwent the aforementioned procedure with Dr. Estrada on 2022. There were no intraoperative complications and the patient was transferred to the recovery room and later the orthopedic unit in stable condition. Once the patient reached the orthopedic floor our orthopedic pain protocol was implemented along with the following:    Anticoagulation Medications: Eliquis  Therapy: PT and OT  Activity: WBAT  Bracing: None    Consultations during Admission: Hospitalist service for medical management     COMPLICATIONS/SIGNIFICANT FINDINGS    None    DISCHARGE INFORMATION   Condition at discharge: Good  Discharge destination: Home  Patient was seen by myself on the date of discharge.    FOLLOW UP CARE   Follow up with orthopedics in 2 weeks or sooner should the need arise. Ortho will continue to manage pain control, post op anticoagulation and incision care.     Follow up with your PCP for management of chronic medical problems and to evaluate for post op medical complications including constipation, nausea/vomiting, DVT/PE, anemia, changes in blood pressure, fevers/chills, urinary retention and atelectasis/pneumonia.     Subjective   Patient is doing well on POD #1. Pain is well controlled with oral medications. Ambulating. Tolerating oral intake. Voiding.    Physical Exam   /55 (BP Location: Right arm)   Pulse 56   Temp 97.4  F (36.3  C) (Oral)   Resp 16   Ht 1.854 m (6' 1\")   Wt 95.9 kg (211 lb 8 oz)   SpO2 96%   BMI 27.90 kg/m    The " patient is A&Ox3. Appears comfortable, sitting up at bedside.  Sensation is intact & equal to bilateral lower extremities.  Calves aresoft and non-tender.  Dorsiflexion and plantar flexion is intact.  Appropriate flexion and extension of the toes.   Brisk capillary refill in the toes.   Palpable Right dorsalis pedis pulse.  Right knee dressing C/D/I.     Pertinent Results at Discharge     Hemoglobin   Date/Time Value Ref Range Status   07/07/2022 07:44 AM 14.1 13.3 - 17.7 g/dL Final   06/29/2022 12:10 PM 14.8 13.3 - 17.7 g/dL Final   03/02/2022 04:21 PM 14.5 13.3 - 17.7 g/dL Final   05/19/2004 03:25 PM 14.9 13.3 - 17.7 g/dL Final     INR   Date/Time Value Ref Range Status   12/10/2021 11:24 AM 1.06 0.85 - 1.15 Final   05/19/2004 03:25 PM 1.12 0.86 - 1.14 Final     Platelet Count   Date/Time Value Ref Range Status   06/29/2022 12:10  150 - 450 10e3/uL Final   03/02/2022 04:21  150 - 450 10e3/uL Final   12/14/2021 10:30  150 - 450 10e3/uL Final   05/19/2004 03:25  150 - 450 10e9/L Final       Problem List   Active Problems:    Orthopedic aftercare      Marilu Pelayo PA-C/Dr. Estrada  [facility]  [phone]  Date: 7/7/2022  Time: 10:30 AM

## 2022-07-07 NOTE — CONSULTS
Care Management Discharge Note    Discharge Date: 07/07/2022       Discharge Disposition:  Home with family support     Discharge Services:  None     Discharge DME:  Per treatment team    Discharge Transportation: family or friend will provide    Private pay costs discussed: Not applicable    PAS Confirmation Code:  Not applicable  Patient/family educated on Medicare website which has current facility and service quality ratings:  NA    Education Provided on the Discharge Plan:  Per treatment team  Persons Notified of Discharge Plans: patient   Patient/Family in Agreement with the Plan:  yes    Handoff Referral Completed: not applicable     Additional Information:  Pt discharging home with support from family.         [place]

## 2022-07-08 NOTE — PROGRESS NOTES
[facility]      OUTPATIENT OCCUPATIONAL THERAPY  EVALUATION  PLAN OF TREATMENT FOR OUTPATIENT REHABILITATION  (COMPLETE FOR INITIAL CLAIMS ONLY)  Patient's Last Name, First Name, M.I.  YOB: 1950  Harrison Barrientos                          Provider's Name  [facility] Medical Record No.  [id number]                               Onset Date:  07/06/22   Start of Care Date:  (P) 07/07/22     Type:     ___PT   _X_OT   ___SLP Medical Diagnosis:  (P) R knee scar incision                        OT Diagnosis:  decreased IND   Visits from SOC:  1   _________________________________________________________________________________  Plan of Treatment/Functional Goals    Planned Interventions: ADL retraining, bed mobility training, transfer training   Goals: See Occupational Therapy Goals on Care Plan in [facility] electronic health record.    Therapy Frequency: One time eval and treatment  Predicted Duration of Therapy Intervention: 07/07/22  _________________________________________________________________________________    I CERTIFY THE NEED FOR THESE SERVICES FURNISHED UNDER        THIS PLAN OF TREATMENT AND WHILE UNDER MY CARE     (Physician co-signature of this document indicates review and certification of the therapy plan).              Certification date from: (P) 07/07/22, Certification date to: (P) 08/07/22    Referring Physician: Magdi Estrada MD            Initial Assessment        See Occupational Therapy evaluation dated (P) 07/07/22 in Epic electronic health record.

## 2022-07-08 NOTE — PROGRESS NOTES
[facility]      OUTPATIENT PHYSICAL THERAPY EVALUATION  PLAN OF TREATMENT FOR OUTPATIENT REHABILITATION  (COMPLETE FOR INITIAL CLAIMS ONLY)  Patient's Last Name, First Name, M.I.  YOB: 1950  Harrison Barrientos                        Provider's Name  [facility] Medical Record No.  [id number]                               Onset Date:  07/06/22   Start of Care Date:         Type:     _X_PT   ___OT   ___SLP Medical Diagnosis:                           PT Diagnosis:  impaired functional mobility   Visits from [facility]:  1   _________________________________________________________________________________  Plan of Treatment/Functional Goals    Planned Interventions: bed mobility training, gait training, home exercise program, patient/family education, ROM (range of motion), stair training, strengthening, stretching, transfer training     Goals: See Physical Therapy Goals on Care Plan in [facility] electronic health record.    Therapy Frequency: Daily  Predicted Duration of Therapy Intervention: 07/09/22  _________________________________________________________________________________    I CERTIFY THE NEED FOR THESE SERVICES FURNISHED UNDER        THIS PLAN OF TREATMENT AND WHILE UNDER MY CARE     (Physician co-signature of this document indicates review and certification of the therapy plan).               ,      Referring Physician: Magdi Estrada MD            Initial Assessment        See Physical Therapy evaluation dated   in Epic electronic health record.

## 2022-07-11 LAB — BACTERIA TISS BX CULT: NO GROWTH

## 2022-07-13 LAB — BACTERIA TISS BX CULT: NORMAL

## 2022-07-15 DIAGNOSIS — I25.5 ISCHEMIC CARDIOMYOPATHY: ICD-10-CM

## 2022-07-15 RX ORDER — CARVEDILOL 12.5 MG/1
TABLET ORAL
Qty: 180 TABLET | Refills: 1 | Status: SHIPPED | OUTPATIENT
Start: 2022-07-15 | End: 2023-02-27

## 2022-08-04 ENCOUNTER — TRANSFERRED RECORDS (OUTPATIENT)
Dept: HEALTH INFORMATION MANAGEMENT | Facility: CLINIC | Age: 72
End: 2022-08-04

## 2022-08-25 DIAGNOSIS — I10 BENIGN ESSENTIAL HYPERTENSION: ICD-10-CM

## 2022-08-26 DIAGNOSIS — M25.661 KNEE STIFFNESS, RIGHT: Primary | ICD-10-CM

## 2022-08-26 RX ORDER — LISINOPRIL 10 MG/1
TABLET ORAL
Qty: 90 TABLET | Refills: 3 | Status: SHIPPED | OUTPATIENT
Start: 2022-08-26 | End: 2023-08-09

## 2022-08-26 NOTE — TELEPHONE ENCOUNTER
"Last Written Prescription Date:  3/1/2022  Last Fill Quantity: 90,  # refills: 1   Last office visit provider:  6/29/2022     Requested Prescriptions   Pending Prescriptions Disp Refills     lisinopril (ZESTRIL) 10 MG tablet [Pharmacy Med Name: LISINOPRIL 10MG TABS] 90 tablet 1     Sig: TAKE 1 TABLET (10 MG TOTAL) BY MOUTH DAILY.       ACE Inhibitors (Including Combos) Protocol Passed - 8/25/2022  5:02 AM        Passed - Blood pressure under 140/90 in past 12 months     BP Readings from Last 3 Encounters:   07/07/22 113/55   06/29/22 132/70   03/29/22 118/68                 Passed - Recent (12 mo) or future (30 days) visit within the authorizing provider's specialty     Patient has had an office visit with the authorizing provider or a provider within the authorizing providers department within the previous 12 mos or has a future within next 30 days. See \"Patient Info\" tab in inbasket, or \"Choose Columns\" in Meds & Orders section of the refill encounter.              Passed - Medication is active on med list        Passed - Patient is age 18 or older        Passed - Normal serum creatinine on file in past 12 months     Recent Labs   Lab Test 07/07/22  0744   CR 0.89       Ok to refill medication if creatinine is low          Passed - Normal serum potassium on file in past 12 months     Recent Labs   Lab Test 07/07/22  0744   POTASSIUM 4.6                  Ale Saravia RN 08/26/22 1:34 PM  "

## 2022-08-30 RX ORDER — CYCLOBENZAPRINE HCL 5 MG
TABLET ORAL
Qty: 30 TABLET | Refills: 0 | Status: SHIPPED | OUTPATIENT
Start: 2022-08-30 | End: 2022-09-30

## 2022-09-29 ENCOUNTER — TRANSFERRED RECORDS (OUTPATIENT)
Dept: HEALTH INFORMATION MANAGEMENT | Facility: CLINIC | Age: 72
End: 2022-09-29

## 2022-09-30 DIAGNOSIS — M25.661 KNEE STIFFNESS, RIGHT: ICD-10-CM

## 2022-09-30 RX ORDER — CYCLOBENZAPRINE HCL 5 MG
TABLET ORAL
Qty: 30 TABLET | Refills: 0 | Status: SHIPPED | OUTPATIENT
Start: 2022-09-30 | End: 2022-10-27

## 2022-09-30 NOTE — TELEPHONE ENCOUNTER
Routing refill request to provider for review/approval because:  Drug not on [place] [facility] [place]     Last Written Prescription Date:  8/30/22  Last Fill Quantity: 30,  # refills: 0   Last office visit provider:  6/29/22     Requested Prescriptions   Pending Prescriptions Disp Refills     cyclobenzaprine (FLEXERIL) 5 MG tablet [Pharmacy Med Name: CYCLOBENZAPRINE HCL 5MG TABS] 30 tablet 0     Sig: TAKE ONE TABLET BY MOUTH EVERY NIGHT AT BEDTIME AS NEEDED       There is no refill protocol information for this order          Luis Alberto Leonardo 09/30/22 12:28 PM

## 2022-10-24 ENCOUNTER — VIRTUAL VISIT (OUTPATIENT)
Dept: FAMILY MEDICINE | Facility: CLINIC | Age: 72
End: 2022-10-24
Payer: COMMERCIAL

## 2022-10-24 DIAGNOSIS — I51.3 INTRACARDIAC THROMBUS: ICD-10-CM

## 2022-10-24 DIAGNOSIS — U07.1 INFECTION DUE TO 2019 NOVEL CORONAVIRUS: Primary | ICD-10-CM

## 2022-10-24 PROCEDURE — 99213 OFFICE O/P EST LOW 20 MIN: CPT | Mod: 95 | Performed by: FAMILY MEDICINE

## 2022-10-24 NOTE — PATIENT INSTRUCTIONS
COVID-19 Outpatient Treatments  Your care team can help you find the best treatments for COVID-19. Talk to a health care provider or refer to the FDA medicine fact sheets below.    Important: You CAN'T have molnupiravir or Paxlovid if you are starting the medicine more than 5 days after your symptoms have started.  Paxlovid: https://www.fda.gov/media/530908/download  Molnupiravir: https://www.fda.gov/media/100603/download  Monoclonal antibodies: https://combatcovid.hhs.gov/what-are-monoclonal-antibodies  Paxlovid (nimatrelvir and ritonavir)  How it works  Two medicines (nirmatrelvir and ritonavir) are taken together. They stop the virus from growing. Less amount of virus is easier for your body to fight.  Benefits  Lowers risk of a hospital stay or death from COVID-19.  How to take    Medicine comes in a daily container with both medicine tablets. Take by mouth twice daily (once in the morning, once at night) for 5 days.    The number of tablets to take varies by patient.    Don't chew or break capsules. Swallow whole.  When to take  Take as soon as possible after positive COVID-19 test result, and within 5 days of your first symptoms.  Who can take it  Patients must be 12 years or older, weigh at least 88 pounds, and have tested positive for COVID-19. This is the preferred treatment for pregnant patients.  Possible side effects  Can cause altered sense of taste, diarrhea (loose, watery stools), high blood pressure, muscle aches.  Medicine conflicts    Some medicines may conflict with Paxlovid and may cause serious side effects.    Tell your care team about all the medicines you take, including prescription and over-the-counter medicines, vitamins and herbal supplements.    Your provider will review your medicines to make sure that you can safely take Paxlovid.  Cautions    Paxlovid is not advised for patients with severe kidney or liver disease. If you have kidney or liver problems, the dose may need to be  changed.    If you are pregnant or breastfeeding, talk to your care team about your options.    If you are sexually active, use trusted birth control while taking Paxlovid.  Molnupiravir  How it works  Stops the virus from growing. Less amount of virus is easier for your body to fight.  Benefits  Lowers risk of a hospital stay or death from COVID-19.  How to take  Take 4 capsules by mouth every 12 hours (4 in the morning and 4 at night) for 5 days. Don't chew or break capsules. Swallow whole.  When to take  Take as soon as possible after positive COVID-19 test result, and within 5 days of your first symptoms.  Who can take it  Patients must be 18 years or older and have tested positive for COVID-19.  Possible side effects  Diarrhea (loose, watery stools), nausea (feeling sick to your stomach), dizziness, headaches.  Medicine conflicts  Right now, there are no known conflicts with other drugs. But tell your care team all medicines you take.  Cautions    This is not advised for patients who are pregnant.    Patients who could become pregnant should use trusted birth control until 4 days after their last dose.    Sexually active people of any gender should use trusted birth control for 3 months after their last dose.  Monoclonal antibodies  How it works  Monoclonal antibodies can detect pieces of the COVID virus and stop it from infecting your cells.  Benefits  Lowers risk of a hospital stay or death from COVID-19. Monoclonal antibodies are known to work well against the omicron variant.  How it is given to you  You will receive the treatment either by an infusion through your vein (IV) or shots.  When to take  Get as soon as possible after you test positive for COVID-19, and within 7 days of your first symptoms.  Who can take it  Patients must be 12 years or older, weigh at least 88 pounds and have tested positive for COVID-19.  Possible side effects  Fever, chills, diarrhea (loose, watery stools), dizziness,  itchiness and rash.  More serious side effects include: fever, difficulty breathing, low oxygen level in your blood, chills, tiredness, fast or slow heart rate, chest discomfort or pain, weakness, confusion, nausea, headache, shortness of breath, low or high blood pressure, wheezing, swelling of your lips, face, or throat, rash including hives, itching, muscle aches, dizziness, feeling faint and sweating.  If you receive an IV, you may have brief pain, bleeding and bruising of the skin, soreness, swelling and possible infection at the place where you get the IV needle.  Medicine conflicts  Please tell you care team other medicines you take so they can assess if there are any conflicts.  Cautions  Your doctor will talk with you about risks and benefits of this treatment and will help choose the best option for you.  For informational purposes only. Not to replace the advice of your health care provider.  Copyright   2022 [facility]. All rights reserved. Clinically reviewed by Kiley Daly. O Entregador [id number] - 08/22.    Instructions for Patients      What are the symptoms of COVID-19?  Symptoms can include fever, cough, shortness of breath, chills, headache, muscle pain sore throat, fatigue, runny or stuffy nose, and loss of taste and smell. Other less common symptoms include nausea, vomiting, or diarrhea (watery stools).    Know when to call 911. Emergency warning signs include:    Trouble breathing or shortness of breath    Pain or pressure in the chest that doesn't go away    Feeling confused like you haven't felt before, or not being able to wake up    Bluish-colored lips or face    How can I take care of myself?  1. Get lots of rest. Drink extra fluids (unless a doctor has told you not to).  2. Take Tylenol (acetaminophen) for fever or pain. If you have liver or kidney problems, ask your family doctor if it's okay to take Tylenol   Adults:   650 mg (two 325 mg pills or tablets) every 4 to 6 hours,  or...   1,000 mg (two 500 mg pills or tablets) every 8 hours as needed.  Note: Don't take more than 3,000 mg in one day. Acetaminophen is found in many medicines (both prescribed and over the counter). Read all labels to be sure you don't take too much.  For children, check the Tylenol bottle for the right dose. The dose is based on the child's age or weight.  3. Take over the counter medicines for your symptoms as needed. Talk to your pharmacist.  4. If you have other health problems (like cancer, heart failure, an organ transplant, or severe kidney disease): Call your specialty clinic if you don't feel better in the next 2 days.    Where can I get more information?     [facility] COVID-19 Resource Hub: www.[facility].org/covid19/     CDC Quarantine & Isolation: https://www.cdc.gov/coronavirus/2019-ncov/your-health/quarantine-isolation.html     CDC - What to Do If You're Sick: https://www.cdc.gov/coronavirus/2019-ncov/if-you-are-sick/index.html    [facility] clinical trials (COVID-19 research studies): [email or web address]    [place] Department of Health COVID-19 Public Hotline: 1-629.953.7959

## 2022-10-24 NOTE — PROGRESS NOTES
Harrison is a 72 year old who is being evaluated via a billable video visit.      How would you like to obtain your AVS? MyChart  If the video visit is dropped, the invitation should be resent by: Text to cell phone: [phone]  Will anyone else be joining your video visit? No        Assessment & Plan     Infection due to 2019 novel coronavirus  SEE EPIC care orders  The potential side effects of this medication have been discussed with the patient.  Call if any significant problems with these are experienced.  Advised stop Lipitor today and Hod off a week after paxolovid  Decrease eliquis to 2.5 mg BID x 5 days  - nirmatrelvir and ritonavir (PAXLOVID) therapy pack; Take 3 tablets by mouth 2 times daily for 5 days (Take 2 Nirmatrelvir tablets and 1 Ritonavir tablet twice daily for 5 days)    Intracardiac thrombus  As above  - apixaban ANTICOAGULANT (ELIQUIS) 2.5 MG tablet; Take 1 tablet (2.5 mg) by mouth 2 times daily for 5 days      Return in about 1 week (around 10/31/2022), or if symptoms worsen or fail to improve, for recheck/ sooner if worse or New symptoms.    Pamela Hermosillo MD  [facility]    Price Terry is a 72 year old, presenting for the following health issues:  No chief complaint on file.      HPI       COVID-19 Symptom Review  How many days ago did these symptoms start? 2 days ago, home test positive.     Are any of the following symptoms significant for you?    New or worsening difficulty breathing? No    Worsening cough? Yes, it's a dry cough.     Fever or chills? No    Headache: YES    Sore throat: YES    Chest pain: No    Diarrhea: No    Body aches? YES    What treatments has patient tried? OTC medication   Does patient live in a nursing home, group home, or shelter? No  Does patient have a way to get food/medications during quarantined? Yes, I have a friend or family member who can help me.  Past medical history, family history, medications and social history reviewed  today and updated in EPIC.          Review of Systems   Constitutional, HEENT, cardiovascular, pulmonary, GI, , musculoskeletal, neuro, skin, endocrine and psych systems are negative, except as otherwise noted.      Objective           Vitals:  No vitals were obtained today due to virtual visit.    Physical Exam   GENERAL: Healthy, alert and no distress  EYES: Eyes grossly normal to inspection.  No discharge or erythema, or obvious scleral/conjunctival abnormalities.  RESP: No audible wheeze, cough, or visible cyanosis.  No visible retractions or increased work of breathing.    SKIN: Visible skin clear. No significant rash, abnormal pigmentation or lesions.  NEURO: Cranial nerves grossly intact.  Mentation and speech appropriate for age.  PSYCH: Mentation appears normal, affect normal/bright, judgement and insight intact, normal speech and appearance well-groomed.    Video-Visit Details    Video Start Time: 7:36AM    Type of service:  Video Visit    Video End Time:7:49 AM    Originating Location (pt. Location): Home    Distant Location (provider location):  On-site    Platform used for Video Visit: [facility]   7:50 AM -visit complete

## 2022-10-26 DIAGNOSIS — E78.00 HYPERCHOLESTEROLEMIA: ICD-10-CM

## 2022-10-26 DIAGNOSIS — E78.5 HYPERLIPEMIA: Primary | ICD-10-CM

## 2022-10-26 DIAGNOSIS — M25.661 KNEE STIFFNESS, RIGHT: ICD-10-CM

## 2022-10-26 RX ORDER — ATORVASTATIN CALCIUM 40 MG/1
TABLET, FILM COATED ORAL
Qty: 90 TABLET | Refills: 3 | Status: SHIPPED | OUTPATIENT
Start: 2022-10-26 | End: 2023-12-06

## 2022-10-27 RX ORDER — CYCLOBENZAPRINE HCL 5 MG
TABLET ORAL
Qty: 30 TABLET | Refills: 0 | Status: SHIPPED | OUTPATIENT
Start: 2022-10-27 | End: 2023-04-03

## 2022-11-19 ENCOUNTER — HEALTH MAINTENANCE LETTER (OUTPATIENT)
Age: 72
End: 2022-11-19

## 2022-11-23 ENCOUNTER — IMMUNIZATION (OUTPATIENT)
Dept: FAMILY MEDICINE | Facility: CLINIC | Age: 72
End: 2022-11-23
Payer: COMMERCIAL

## 2022-11-23 PROCEDURE — 90662 IIV NO PRSV INCREASED AG IM: CPT

## 2022-11-23 PROCEDURE — G0008 ADMIN INFLUENZA VIRUS VAC: HCPCS

## 2022-12-16 DIAGNOSIS — I51.3 MURAL THROMBUS OF HEART: ICD-10-CM

## 2022-12-16 RX ORDER — APIXABAN 5 MG/1
TABLET, FILM COATED ORAL
Qty: 180 TABLET | Refills: 1 | Status: SHIPPED | OUTPATIENT
Start: 2022-12-16 | End: 2023-06-04

## 2022-12-17 NOTE — TELEPHONE ENCOUNTER
Last Written Prescription Date:  5/31/22  Last Fill Quantity: 180 # refills: 1  Last office visit provider: 10/24/22     Requested Prescriptions   Pending Prescriptions Disp Refills     ELIQUIS ANTICOAGULANT 5 MG tablet [Pharmacy Med Name: ELIQUIS 5MG TABS] 180 tablet 1     Sig: TAKE ONE TABLET BY MOUTH TWICE A DAY       Direct Oral Anticoagulant Agents Passed - 12/16/2022  8:45 AM        Passed - Normal Platelets on file in past 12 months     Recent Labs   Lab Test 06/29/22  1210                  Passed - Medication is active on med list        Passed - Patient is 18-79 years of age        Passed - Serum creatinine less than or equal to 1.4 on file in past 12 mos     Recent Labs   Lab Test 07/07/22  0744   CR 0.89       Ok to refill medication if creatinine is low          Passed - Weight is greater than 60 kg for the past year     Wt Readings from Last 3 Encounters:   07/06/22 95.9 kg (211 lb 8 oz)   06/29/22 97.5 kg (215 lb)   03/29/22 98.5 kg (217 lb 3.2 oz)             Passed - Recent (6 mo) or future (30 days) visit within the authorizing provider's specialty             Chelsea Wilson RN 12/16/22 9:29 PM

## 2023-02-07 ENCOUNTER — IMMUNIZATION (OUTPATIENT)
Dept: FAMILY MEDICINE | Facility: CLINIC | Age: 73
End: 2023-02-07
Payer: COMMERCIAL

## 2023-02-07 PROCEDURE — 91312 COVID-19 VACCINE BIVALENT BOOSTER 12+ (PFIZER): CPT

## 2023-02-07 PROCEDURE — 0124A COVID-19 VACCINE BIVALENT BOOSTER 12+ (PFIZER): CPT

## 2023-02-08 ENCOUNTER — TRANSFERRED RECORDS (OUTPATIENT)
Dept: HEALTH INFORMATION MANAGEMENT | Facility: CLINIC | Age: 73
End: 2023-02-08

## 2023-02-24 ENCOUNTER — TRANSFERRED RECORDS (OUTPATIENT)
Dept: HEALTH INFORMATION MANAGEMENT | Facility: CLINIC | Age: 73
End: 2023-02-24

## 2023-03-20 ENCOUNTER — HOSPITAL ENCOUNTER (OUTPATIENT)
Dept: CT IMAGING | Facility: HOSPITAL | Age: 73
Discharge: HOME | End: 2023-03-20
Attending: PHYSICIAN ASSISTANT | Admitting: PHYSICIAN ASSISTANT
Payer: COMMERCIAL

## 2023-03-20 DIAGNOSIS — R31.0 GROSS HEMATURIA: ICD-10-CM

## 2023-03-20 LAB
CREAT BLD-MCNC: 1.2 MG/DL (ref 0.7–1.3)
GFR SERPL CREATININE-BSD FRML MDRD: >60 ML/MIN/1.73M2

## 2023-03-20 PROCEDURE — 74178 CT ABD&PLV WO CNTR FLWD CNTR: CPT

## 2023-03-20 PROCEDURE — 250N000011 HC RX IP 250 OP 636: Performed by: PHYSICIAN ASSISTANT

## 2023-03-20 PROCEDURE — 82565 ASSAY OF CREATININE: CPT

## 2023-03-20 RX ORDER — IOPAMIDOL 755 MG/ML
100 INJECTION, SOLUTION INTRAVASCULAR ONCE
Status: COMPLETED | OUTPATIENT
Start: 2023-03-20 | End: 2023-03-20

## 2023-03-20 RX ADMIN — IOPAMIDOL 100 ML: 755 INJECTION, SOLUTION INTRAVENOUS at 15:05

## 2023-03-27 ENCOUNTER — TRANSFERRED RECORDS (OUTPATIENT)
Dept: HEALTH INFORMATION MANAGEMENT | Facility: CLINIC | Age: 73
End: 2023-03-27

## 2023-03-29 ASSESSMENT — ENCOUNTER SYMPTOMS
SHORTNESS OF BREATH: 0
DYSURIA: 0
HEMATOCHEZIA: 0
NAUSEA: 0
PALPITATIONS: 0
WEAKNESS: 0
FREQUENCY: 1
FEVER: 0
HEADACHES: 0
PARESTHESIAS: 0
HEARTBURN: 0
MYALGIAS: 0
JOINT SWELLING: 0
NERVOUS/ANXIOUS: 0
COUGH: 0
DIARRHEA: 0
HEMATURIA: 1
EYE PAIN: 0
CHILLS: 0
SORE THROAT: 0
ABDOMINAL PAIN: 0
CONSTIPATION: 1
ARTHRALGIAS: 0
DIZZINESS: 0

## 2023-03-29 ASSESSMENT — ACTIVITIES OF DAILY LIVING (ADL): CURRENT_FUNCTION: NO ASSISTANCE NEEDED

## 2023-04-03 ENCOUNTER — NURSE TRIAGE (OUTPATIENT)
Dept: FAMILY MEDICINE | Facility: CLINIC | Age: 73
End: 2023-04-03

## 2023-04-03 ENCOUNTER — OFFICE VISIT (OUTPATIENT)
Dept: FAMILY MEDICINE | Facility: CLINIC | Age: 73
End: 2023-04-03
Payer: COMMERCIAL

## 2023-04-03 VITALS
WEIGHT: 212.6 LBS | TEMPERATURE: 97.5 F | RESPIRATION RATE: 16 BRPM | SYSTOLIC BLOOD PRESSURE: 159 MMHG | HEIGHT: 73 IN | DIASTOLIC BLOOD PRESSURE: 81 MMHG | HEART RATE: 59 BPM | BODY MASS INDEX: 28.18 KG/M2 | OXYGEN SATURATION: 99 %

## 2023-04-03 DIAGNOSIS — N30.00 ACUTE CYSTITIS WITHOUT HEMATURIA: ICD-10-CM

## 2023-04-03 DIAGNOSIS — R30.0 BURNING WITH URINATION: Primary | ICD-10-CM

## 2023-04-03 LAB
ALBUMIN UR-MCNC: >=300 MG/DL
APPEARANCE UR: ABNORMAL
BACTERIA #/AREA URNS HPF: ABNORMAL /HPF
BILIRUB UR QL STRIP: ABNORMAL
COLOR UR AUTO: ABNORMAL
GLUCOSE UR STRIP-MCNC: NEGATIVE MG/DL
HGB UR QL STRIP: ABNORMAL
KETONES UR STRIP-MCNC: ABNORMAL MG/DL
LEUKOCYTE ESTERASE UR QL STRIP: ABNORMAL
NITRATE UR QL: NEGATIVE
PH UR STRIP: 5.5 [PH] (ref 5–8)
RBC #/AREA URNS AUTO: >100 /HPF
SP GR UR STRIP: >=1.03 (ref 1–1.03)
SQUAMOUS #/AREA URNS AUTO: ABNORMAL /LPF
UROBILINOGEN UR STRIP-ACNC: 0.2 E.U./DL
WBC #/AREA URNS AUTO: ABNORMAL /HPF

## 2023-04-03 PROCEDURE — 87086 URINE CULTURE/COLONY COUNT: CPT | Performed by: FAMILY MEDICINE

## 2023-04-03 PROCEDURE — 81001 URINALYSIS AUTO W/SCOPE: CPT | Performed by: FAMILY MEDICINE

## 2023-04-03 PROCEDURE — 87088 URINE BACTERIA CULTURE: CPT | Performed by: FAMILY MEDICINE

## 2023-04-03 PROCEDURE — 99213 OFFICE O/P EST LOW 20 MIN: CPT | Performed by: FAMILY MEDICINE

## 2023-04-03 PROCEDURE — 87186 SC STD MICRODIL/AGAR DIL: CPT | Performed by: FAMILY MEDICINE

## 2023-04-03 RX ORDER — OXYCODONE HYDROCHLORIDE 5 MG/1
TABLET ORAL
COMMUNITY
End: 2023-04-03

## 2023-04-03 RX ORDER — IBUPROFEN 200 MG
200 TABLET ORAL EVERY 4 HOURS PRN
COMMUNITY
End: 2023-05-08

## 2023-04-03 RX ORDER — SULFAMETHOXAZOLE/TRIMETHOPRIM 800-160 MG
1 TABLET ORAL 2 TIMES DAILY
Qty: 14 TABLET | Refills: 0 | Status: SHIPPED | OUTPATIENT
Start: 2023-04-03 | End: 2023-04-10

## 2023-04-03 ASSESSMENT — ENCOUNTER SYMPTOMS: DYSURIA: 1

## 2023-04-03 NOTE — TELEPHONE ENCOUNTER
"Nurse Triage SBAR    Is this a 2nd Level Triage? YES, LICENSED PRACTITIONER REVIEW IS REQUIRED    Situation:   Patient calling in reporting very painful urination that started about 2 days ago    Background:   Recently had a cystoscopy (About 1 week ago)    Assessment:   Patient endorses 6-7/10 burning pain each time he urinates. He also reports his urine is very foul smelling.    No fever or flank pain.    Protocol Recommended Disposition:   See in Office Today    Recommendation:   Suspect potential UTI, can you see him today?     Routed to provider    Does the patient meet one of the following criteria for ADS visit consideration? 16+ years old, with an [facility] PCP     TIP  Providers, please consider if this condition is appropriate for management at one of our Acute and Diagnostic Services sites.     If patient is a good candidate, please use dotphrase <dot>triageresponse and select Refer to ADS to document.      Reason for Disposition    All other males with painful urination, or patient wants to be seen    Additional Information    Negative: Unable to urinate (or only a few drops) and bladder feels very full    Negative: Swollen scrotum    Negative: Pain in scrotum or testicle that persists > 1 hour    Negative: Fever > 100.4 F (38.0 C)    Negative: Vomiting    Negative: Patient sounds very sick or weak to the triager    Negative: SEVERE pain with urination    Negative: Side (flank) or lower back pain present    Negative: Taking antibiotic > 24 hours for UTI and fever persists    Negative: Taking antibiotic > 3 days for UTI and painful urination not improved    Negative: Taking treatment > 3 days for STI (e.g., penile discharge from gonorrhea, chlamydia) and painful urination not improved    Answer Assessment - Initial Assessment Questions  1. SEVERITY: \"How bad is the pain?\"  (e.g., Scale 1-10; mild, moderate, or severe)    - MILD (1-3): complains slightly about urination hurting    - MODERATE (4-7): interferes " "with normal activities      - SEVERE (8-10): excruciating, unwilling or unable to urinate because of the pain       6-7  2. FREQUENCY: \"How many times have you had painful urination today?\"       Every time he urinates for last 2 days  3. PATTERN: \"Is pain present every time you urinate or just sometimes?\"       Everytime  4. ONSET: \"When did the painful urination start?\"       2 days ago  5. FEVER: \"Do you have a fever?\" If Yes, ask: \"What is your temperature, how was it measured, and when did it start?\"      No  6. PAST UTI: \"Have you had a urine infection before?\" If Yes, ask: \"When was the last time?\" and \"What happened that time?\"       No unsure  7. CAUSE: \"What do you think is causing the painful urination?\"       Recent bladder scope  8. OTHER SYMPTOMS: \"Do you have any other symptoms?\" (e.g., flank pain, penile discharge, scrotal pain, blood in urine)      Urine is dark, smells bad    Protocols used: URINATION PAIN - MALE-A-OH      "

## 2023-04-03 NOTE — TELEPHONE ENCOUNTER
Called and spoke with Harrison, he will come in later this afternoon.    Luis Alberto Leonardo RN     [facility]

## 2023-04-03 NOTE — TELEPHONE ENCOUNTER
He should be seen.  He may have a urinary tract infection. I can work him in at the end of the day. OK to put on at 4:40 but he can come closer to 5.

## 2023-04-03 NOTE — PATIENT INSTRUCTIONS
Chao,    I sent a prescription for Bactrim to your pharmacy  Drink plenty of liquids  Please make sure to let the urology clinic know that you were treated with an antibiotic  Your urine culture will take 1-2 days    Brad Roldan MD

## 2023-04-03 NOTE — PROGRESS NOTES
Assessment & Plan     Burning with urination  Acute cystitis without hematuria    Probable urinary tract infection following his recent cystoscopy    Urinalysis is abnormal  Treat with Bactrim  We will await the urine culture  Recommend that he contact urology as well given his symptoms following his procedure  Recommend immediate follow-up if developing a fever, abdominal pain, or other worrisome symptoms    - UA with Microscopic reflex to Culture - Clinic Collect  - UA Microscopic with Reflex to Culture  - Urine Culture  - sulfamethoxazole-trimethoprim (BACTRIM DS) 800-160 MG tablet  Dispense: 14 tablet; Refill: 0                Brad Roldan MD  [facility]    Price Terry is a 72 year old, presenting for the following health issues:  Dysuria (Has had issues and symptoms since last week, has had chills )    This is a pleasant 72-year-old male who presents to the clinic with a several day history of burning with urination.  He has followed up with urology and had a recent cystoscopy for evaluation of hematuria.  A recent CT urogram showed prostamegaly as well as nephrolithiasis.  He states that since his procedure he has had some burning with urination.  He has had urinary frequency and some chills as well.  Symptoms have been persistent over the past week.  He denies high fever or flank pain.  He was advised to follow-up in the clinic for an evaluation.    Medical history is notable for coronary artery disease, ischemic cardiomyopathy, benign prostatic hypertrophy with obstructive symptoms, and an elevated PSA as well as hyperlipidemia.       Regarding his cardiovascular history he had a previous myocardial infarction in 2004.  He had a single-vessel coronary artery disease with coronary stent placement in the proximal LAD at the time.  He has followed up with cardiology on a consistent basis for an ischemic cardiomyopathy.    A previous echocardiogram revealed an  "ejection fraction of 32% with mild mitral regurgitation and mild tricuspid regurgitation.    A follow-up nuclear cardiac stress test was negative for inducible myocardial ischemia in 2020.  There was evidence of a previous transmural myocardial infarction in the mid to apical segments and anteroseptal segment.  There was akinesis in the mid to apical anterior wall and distal anteroseptal segment.       He continues to follow-up with Dr. Ohara, cardiology. In the meantime, he did have an abnormal cardiac MRI showing a moderate reduction in the left ventricular ejection fraction.  The ejection fraction was estimated to be 41%.  There was an area of nontransmural myocardial infarction.  Also, there was a small area of mural thrombus involving the distal anteroseptal and apical septal wall segments.  He has been treated with Eliquis.           4/3/2023     4:48 PM   Additional Questions   Roomed by Stormy VITALE CMA     Dysuria    History of Present Illness       Reason for visit:  Burning and pain with urination  Symptom onset:  3-7 days ago  Symptoms include:  Burning and pain when urinating  Symptom intensity:  Moderate  Symptom progression:  Worsening  Had these symptoms before:  No  What makes it worse:  No  What makes it better:  No    He eats 2-3 servings of fruits and vegetables daily.He consumes 1 sweetened beverage(s) daily.He exercises with enough effort to increase his heart rate 20 to 29 minutes per day.  He exercises with enough effort to increase his heart rate 4 days per week.   He is taking medications regularly.               Review of Systems   Genitourinary: Positive for dysuria.            Objective    BP (!) 159/81 (BP Location: Left arm, Patient Position: Sitting, Cuff Size: Adult Regular)   Pulse 59   Temp 97.5  F (36.4  C) (Oral)   Resp 16   Ht 1.854 m (6' 1\")   Wt 96.4 kg (212 lb 9.6 oz)   SpO2 99%   BMI 28.05 kg/m    Body mass index is 28.05 kg/m .  Physical Exam   GENERAL: healthy, alert and " no distress  RESP: lungs clear to auscultation - no rales, rhonchi or wheezes  CV: regular rate and rhythm, normal S1 S2, no S3 or S4, no murmur, click or rub, no peripheral edema and peripheral pulses strong  MS: no gross musculoskeletal defects noted, no edema  NEURO: Normal strength and tone, mentation intact and speech normal  PSYCH: mentation appears normal, affect normal/bright

## 2023-04-04 LAB — BACTERIA UR CULT: ABNORMAL

## 2023-04-05 ENCOUNTER — OFFICE VISIT (OUTPATIENT)
Dept: FAMILY MEDICINE | Facility: CLINIC | Age: 73
End: 2023-04-05
Payer: COMMERCIAL

## 2023-04-05 VITALS
SYSTOLIC BLOOD PRESSURE: 138 MMHG | WEIGHT: 211 LBS | TEMPERATURE: 98 F | DIASTOLIC BLOOD PRESSURE: 76 MMHG | BODY MASS INDEX: 27.96 KG/M2 | HEIGHT: 73 IN | HEART RATE: 53 BPM | OXYGEN SATURATION: 96 % | RESPIRATION RATE: 16 BRPM

## 2023-04-05 DIAGNOSIS — D12.5 ADENOMATOUS POLYP OF SIGMOID COLON: ICD-10-CM

## 2023-04-05 DIAGNOSIS — I25.5 ISCHEMIC CONGESTIVE CARDIOMYOPATHY (H): ICD-10-CM

## 2023-04-05 DIAGNOSIS — I25.10 ATHEROSCLEROSIS OF NATIVE CORONARY ARTERY OF NATIVE HEART WITHOUT ANGINA PECTORIS: ICD-10-CM

## 2023-04-05 DIAGNOSIS — E78.00 HYPERCHOLESTEROLEMIA: ICD-10-CM

## 2023-04-05 DIAGNOSIS — R97.20 ELEVATED PROSTATE SPECIFIC ANTIGEN (PSA): ICD-10-CM

## 2023-04-05 DIAGNOSIS — I42.0 ISCHEMIC CONGESTIVE CARDIOMYOPATHY (H): ICD-10-CM

## 2023-04-05 DIAGNOSIS — R35.0 BENIGN PROSTATIC HYPERPLASIA WITH URINARY FREQUENCY: ICD-10-CM

## 2023-04-05 DIAGNOSIS — Z00.00 ENCOUNTER FOR MEDICARE ANNUAL WELLNESS EXAM: Primary | ICD-10-CM

## 2023-04-05 DIAGNOSIS — N40.1 BENIGN PROSTATIC HYPERPLASIA WITH URINARY FREQUENCY: ICD-10-CM

## 2023-04-05 PROCEDURE — 99214 OFFICE O/P EST MOD 30 MIN: CPT | Mod: 25 | Performed by: FAMILY MEDICINE

## 2023-04-05 PROCEDURE — G0438 PPPS, INITIAL VISIT: HCPCS | Performed by: FAMILY MEDICINE

## 2023-04-05 ASSESSMENT — ENCOUNTER SYMPTOMS
NERVOUS/ANXIOUS: 0
PALPITATIONS: 0
SORE THROAT: 0
HEADACHES: 0
CONSTIPATION: 1
SHORTNESS OF BREATH: 0
EYE PAIN: 0
HEMATOCHEZIA: 0
CHILLS: 0
JOINT SWELLING: 0
FEVER: 0
COUGH: 0
PARESTHESIAS: 0
FREQUENCY: 1
WEAKNESS: 0
NAUSEA: 0
ABDOMINAL PAIN: 0
HEMATURIA: 1
DIZZINESS: 0
ARTHRALGIAS: 0
DYSURIA: 0
DIARRHEA: 0
MYALGIAS: 0
HEARTBURN: 0

## 2023-04-05 ASSESSMENT — ACTIVITIES OF DAILY LIVING (ADL): CURRENT_FUNCTION: NO ASSISTANCE NEEDED

## 2023-04-05 NOTE — PROGRESS NOTES
SUBJECTIVE:   Harrison is a 72 year old who presents for Preventive Visit.    This is a pleasant 72-year-old male who presents to the clinic for an annual wellness visit.    He had a recent visit where he required treatment for a urinary tract infection.  He had a recent cystoscopy with urology for evaluation of hematuria.  He developed burning with urination and required treatment with Bactrim.  His urine culture confirmed an infection with Proteus mirabilis and he responded to treatment with Bactrim.  He will be following up with urology.  He denies fever or chills.    Medical history is notable for coronary artery disease, ischemic cardiomyopathy, benign prostatic hypertrophy with obstructive symptoms, and an elevated PSA as well as hyperlipidemia.       Regarding his cardiovascular history he had a previous myocardial infarction in 2004.  He had a single-vessel coronary artery disease with coronary stent placement in the proximal LAD at the time.  He has followed up with cardiology on a consistent basis for an ischemic cardiomyopathy.    A previous echocardiogram revealed an ejection fraction of 32% with mild mitral regurgitation and mild tricuspid regurgitation.    A follow-up nuclear cardiac stress test was negative for inducible myocardial ischemia in 2020.  There was evidence of a previous transmural myocardial infarction in the mid to apical segments and anteroseptal segment.  There was akinesis in the mid to apical anterior wall and distal anteroseptal segment.       He continues to follow-up with Dr. Ohara, cardiology. In the meantime, he did have an abnormal cardiac MRI showing a moderate reduction in the left ventricular ejection fraction.  The ejection fraction was estimated to be 41%.  There was an area of nontransmural myocardial infarction.  Also, there was a small area of mural thrombus involving the distal anteroseptal and apical septal wall segments.  He has been treated with Eliquis.     In  "general, he has been feeling well.  He denies current chest pain or shortness of breath.        4/5/2023     2:13 PM   Additional Questions   Roomed by Stormy VITALE CMA     Patient has been advised of split billing requirements and indicates understanding: Yes  Are you in the first 12 months of your Medicare coverage?  No    Healthy Habits:     In general, how would you rate your overall health?  Good    Frequency of exercise:  6-7 days/week    Duration of exercise:  30-45 minutes    Do you usually eat at least 4 servings of fruit and vegetables a day, include whole grains    & fiber and avoid regularly eating high fat or \"junk\" foods?  Yes    Taking medications regularly:  Yes    Medication side effects:  None    Ability to successfully perform activities of daily living:  No assistance needed    Home Safety:  No safety concerns identified    Hearing Impairment:  No hearing concerns    In the past 6 months, have you been bothered by leaking of urine?  No    In general, how would you rate your overall mental or emotional health?  Good      PHQ-2 Total Score: 0    Additional concerns today:  No      Have you ever done Advance Care Planning? (For example, a Health Directive, POLST, or a discussion with a medical provider or your loved ones about your wishes): No, advance care planning information given to patient to review.  Patient plans to discuss their wishes with loved ones or provider.         Fall risk  Fallen 2 or more times in the past year?: No  Any fall with injury in the past year?: No    Cognitive Screening   1) Repeat 3 items (Leader, Season, Table)    2) Clock draw: NORMAL  3) 3 item recall:Recalls 3 objects  Results: 3 items recalled: COGNITIVE IMPAIRMENT LESS LIKELY    Mini-CogTM Copyright WINNIE Guy. Licensed by the author for use in [facility]; reprinted with permission (natalia@.[facility]). All rights reserved.      Do you have sleep apnea, excessive snoring or daytime drowsiness?: no    Reviewed and " updated as needed this visit by clinical staff   Tobacco  Allergies  Meds              Reviewed and updated as needed this visit by Provider                 Social History     Tobacco Use     Smoking status: Never     Smokeless tobacco: Never   Vaping Use     Vaping status: Never Used   Substance Use Topics     Alcohol use: Not Currently     Comment: rare             3/29/2023    12:43 PM   Alcohol Use   Prescreen: >3 drinks/day or >7 drinks/week? No          View : No data to display.              Do you have a current opioid prescription? No  Do you use any other controlled substances or medications that are not prescribed by a provider? None        Current providers sharing in care for this patient include: Patient Care Team:  Brad Roldan MD as PCP - General (Family Medicine)  Bard Roldan MD as Assigned PCP  Marline Ohara MD as Assigned Heart and Vascular Provider    The following health maintenance items are reviewed in Epic and correct as of today:  Health Maintenance   Topic Date Due     ANNUAL REVIEW OF HM ORDERS  Never done     DTAP/TDAP/TD IMMUNIZATION (2 - Td or Tdap) 07/14/2021     MEDICARE ANNUAL WELLNESS VISIT  12/14/2021     FALL RISK ASSESSMENT  04/05/2024     COLORECTAL CANCER SCREENING  10/25/2024     LIPID  12/14/2025     ADVANCE CARE PLANNING  12/14/2025     HEPATITIS C SCREENING  Completed     PHQ-2 (once per calendar year)  Completed     INFLUENZA VACCINE  Completed     Pneumococcal Vaccine: 65+ Years  Completed     ZOSTER IMMUNIZATION  Completed     AORTIC ANEURYSM SCREENING (SYSTEM ASSIGNED)  Completed     COVID-19 Vaccine  Completed     IPV IMMUNIZATION  Aged Out     MENINGITIS IMMUNIZATION  Aged Out     Patient Active Problem List   Diagnosis     Squamous Cell Carcinoma Of The Skin     Diverticulosis     Joint Pain, Localized In The Shoulder     Fatigue     Hypercholesterolemia     Coronary Artery Disease     Ischemic cardiomyopathy     Neck Pain     BPH (benign  prostatic hyperplasia)     Benign Prostatic Hypertrophy With Urinary Obstruction     Serology Prostate-specific Antigen (PSA) Elevated     Adenomatous polyp of sigmoid colon     Arthritis of left knee     Orthopedic aftercare     Past Surgical History:   Procedure Laterality Date     ARTHROPLASTY KNEE Right 12/13/2021    Procedure: RIGHT TOTAL KNEE ARTHROPLASTY;  Surgeon: Magdi Estrada MD;  Location: [facility]     BIOPSY  2016     CORONARY STENT PLACEMENT       CYSTOSCOPY PROSTATE W/ LASER N/A 11/29/2016    Procedure: CYSTOSCOPY WITH TRANSURETHRAL RESECTION OF THE PROSTATE WITH QUANTA LASER;  Surgeon: Tre Cuadra MD;  Location: [facility] [place];  Service:      EXCHANGE POLY COMPONENT ARTHROPLASTY KNEE Right 07/06/2022    Procedure: RIGHT TOTAL KNEE SCAR EXCISION;  Surgeon: Magdi Estrada MD;  Location: [facility]     GENITOURINARY SURGERY  2017       Social History     Tobacco Use     Smoking status: Never     Smokeless tobacco: Never   Vaping Use     Vaping status: Never Used   Substance Use Topics     Alcohol use: Not Currently     Comment: rare     Family History   Problem Relation Age of Onset     Heart Disease Mother         coronary stents     Coronary Artery Disease Mother      Hypertension Mother      Benign prostatic hyperplasia Father      Heart Disease Father      Kidney Cancer Father      Coronary Artery Disease Father      Benign prostatic hyperplasia Paternal Uncle      Benign prostatic hyperplasia Paternal Grandfather          Current Outpatient Medications   Medication Sig Dispense Refill     acetaminophen (TYLENOL) 325 MG tablet Take 2 tablets (650 mg) by mouth every 4 hours as needed for other (For optimal non-opioid multimodal pain management to improve pain control.) 60 tablet 0     atorvastatin (LIPITOR) 40 MG tablet TAKE ONE TABLET BY MOUTH ONCE DAILY 90 tablet 3     carvedilol (COREG) 12.5 MG tablet TAKE ONE TABLET BY MOUTH TWICE A DAY WITH MEALS 180 tablet 0  "    ELIQUIS ANTICOAGULANT 5 MG tablet TAKE ONE TABLET BY MOUTH TWICE A  tablet 1     ibuprofen (ADVIL/MOTRIN) 200 MG tablet Take 200 mg by mouth every 4 hours as needed for pain       lisinopril (ZESTRIL) 10 MG tablet TAKE 1 TABLET (10 MG TOTAL) BY MOUTH DAILY. 90 tablet 3     loratadine (CLARITIN) 10 mg tablet [LORATADINE (CLARITIN) 10 MG TABLET] Take 10 mg by mouth daily as needed.        No Known Allergies          Review of Systems   Constitutional: Negative for chills and fever.   HENT: Negative for congestion, ear pain, hearing loss and sore throat.    Eyes: Negative for pain and visual disturbance.   Respiratory: Negative for cough and shortness of breath.    Cardiovascular: Negative for chest pain, palpitations and peripheral edema.   Gastrointestinal: Positive for constipation. Negative for abdominal pain, diarrhea, heartburn, hematochezia and nausea.   Genitourinary: Positive for frequency, hematuria, impotence and urgency. Negative for dysuria, genital sores and penile discharge.   Musculoskeletal: Negative for arthralgias, joint swelling and myalgias.   Skin: Negative for rash.   Neurological: Negative for dizziness, weakness, headaches and paresthesias.   Psychiatric/Behavioral: Negative for mood changes. The patient is not nervous/anxious.          OBJECTIVE:   BP (!) 152/76 (BP Location: Left arm, Patient Position: Sitting, Cuff Size: Adult Regular)   Pulse 53   Temp 98  F (36.7  C) (Oral)   Resp 16   Ht 1.854 m (6' 1\")   Wt 95.7 kg (211 lb)   SpO2 96%   BMI 27.84 kg/m   Estimated body mass index is 27.84 kg/m  as calculated from the following:    Height as of this encounter: 1.854 m (6' 1\").    Weight as of this encounter: 95.7 kg (211 lb).  Physical Exam  GENERAL: healthy, alert and no distress  EYES: Eyes grossly normal to inspection, PERRL and conjunctivae and sclerae normal  NECK: no adenopathy, no asymmetry, masses, or scars and thyroid normal to palpation  RESP: lungs clear to " "auscultation - no rales, rhonchi or wheezes  CV: regular rate and rhythm, normal S1 S2, no S3 or S4, no murmur, click or rub, no peripheral edema and peripheral pulses strong  ABDOMEN: soft, nontender  MS: no gross musculoskeletal defects noted, no edema  SKIN: no suspicious lesions or rashes  NEURO: Normal strength and tone, mentation intact and speech normal  PSYCH: mentation appears normal, affect normal/bright    Diagnostic Test Results:  Labs reviewed in Epic    ASSESSMENT / PLAN:   Harrison was seen today for wellness visit.    Diagnoses and all orders for this visit:    Encounter for Medicare annual wellness exam    Ischemic congestive cardiomyopathy (H)  Atherosclerosis of native coronary artery of native heart without angina pectoris    Currently stable  Continue plan per cardiology  We will do fasting laboratory testing at the next check  He will continue atorvastatin, carvedilol, lisinopril, and Eliquis    -     Basic metabolic panel; Future    Adenomatous polyp of sigmoid colon    His colonoscopy will be due in October 2024.  Most recent colonoscopy was in October 2019    Hypercholesterolemia    Continue atorvastatin  Check laboratory testing in the near future    -     Hepatic function panel; Future  -     Lipid panel reflex to direct LDL Fasting; Future    Benign prostatic hyperplasia with urinary frequency  Serology Prostate-specific Antigen (PSA) Elevated    Continue to follow-up with urology        Patient has been advised of split billing requirements and indicates understanding: Yes      COUNSELING:  Reviewed preventive health counseling, as reflected in patient instructions       Regular exercise       Healthy diet/nutrition       Alcohol Use        Colon cancer screening       Prostate cancer screening      BMI:   Estimated body mass index is 27.84 kg/m  as calculated from the following:    Height as of this encounter: 1.854 m (6' 1\").    Weight as of this encounter: 95.7 kg (211 lb).   Weight " management plan: Discussed healthy diet and exercise guidelines      He reports that he has never smoked. He has never used smokeless tobacco.      Appropriate preventive services were discussed with this patient, including applicable screening as appropriate for cardiovascular disease, diabetes, osteopenia/osteoporosis, and glaucoma.  As appropriate for age/gender, discussed screening for colorectal cancer, prostate cancer, breast cancer, and cervical cancer. Checklist reviewing preventive services available has been given to the patient.    Reviewed patients plan of care and provided an AVS. The Basic Care Plan (routine screening as documented in Health Maintenance) for Harrison meets the Care Plan requirement. This Care Plan has been established and reviewed with the Patient.          Brad Roldan MD  [facility]    Identified Health Risks:    I have reviewed Opioid Use Disorder and Substance Use Disorder risk factors and made any needed referrals.

## 2023-04-05 NOTE — PATIENT INSTRUCTIONS
Chao,    You can check with insurance regarding coverage for the tetanus booster which you need  We will do your laboratory testing at the time of your preoperative examination.  You should be fasting at that time  We will continue to monitor your blood pressure as you are borderline elevated today    Brad Roldan MD        Patient Education   Personalized Prevention Plan  You are due for the preventive services outlined below.  Your care team is available to assist you in scheduling these services.  If you have already completed any of these items, please share that information with your care team to update in your medical record.  Health Maintenance Due   Topic Date Due    ANNUAL REVIEW OF HM ORDERS  Never done    Diptheria Tetanus Pertussis (DTAP/TDAP/TD) Vaccine (2 - Td or Tdap) 07/14/2021

## 2023-04-17 ENCOUNTER — TELEPHONE (OUTPATIENT)
Dept: CARDIOLOGY | Facility: CLINIC | Age: 73
End: 2023-04-17
Payer: COMMERCIAL

## 2023-04-17 NOTE — TELEPHONE ENCOUNTER
May hold eliquis for procedure - usually it is a 3 day hold.  Can we confirm with urology length of time?

## 2023-04-17 NOTE — TELEPHONE ENCOUNTER
Pt having CYSTOSCOPY BIPOLAR RESECTION TRANSURETHRAL PROSTATE procedure 5/19, will be spending overnight in hospital.  Pt asking to hold Eliquis for 5 days prior to procedure.  Pt on Eliquis for mural thrombus.    Dr Ohara - ok to hold Eliquis?  -[facility]

## 2023-04-17 NOTE — TELEPHONE ENCOUNTER
[facility] Call Center    Phone Message    May a detailed message be left on voicemail: yes     Reason for Call: Other: Harrison called because he has a procedure scheduled for 5/19 and he was wondering if he needed to hold his Eliquis for a few days prior to his procedure. Please reach out to Harrison to discuss. Thank you!     Action Taken: Other: Cardiology    Travel Screening: Not Applicable     Thank you!  [facility]

## 2023-04-18 NOTE — TELEPHONE ENCOUNTER
JANNY for Marilu / Dr Baldwin and [facility] requesting clarification of Elijaxson stroud.  Asked for call back to [phone].  -nury

## 2023-04-20 ENCOUNTER — MYC MEDICAL ADVICE (OUTPATIENT)
Dept: CARDIOLOGY | Facility: CLINIC | Age: 73
End: 2023-04-20
Payer: COMMERCIAL

## 2023-04-24 NOTE — TELEPHONE ENCOUNTER
[facility] called back - 5 day hold request of Eliquis is there standard, but Dr Baldwin will usually use what the ordering provider requests.    Dr Ohara - you can request 3 day hold, or can follow their recommendation for 5 day hold.  Which do you prefer?  -kylie

## 2023-04-24 NOTE — TELEPHONE ENCOUNTER
Dr Baldwin's team called back = he did ok a 3 day hold of Eliquis.      Pt updated via [facility] [employer] message.  -kylie

## 2023-05-08 ENCOUNTER — OFFICE VISIT (OUTPATIENT)
Dept: FAMILY MEDICINE | Facility: CLINIC | Age: 73
End: 2023-05-08
Payer: COMMERCIAL

## 2023-05-08 VITALS
DIASTOLIC BLOOD PRESSURE: 60 MMHG | WEIGHT: 210 LBS | BODY MASS INDEX: 27.83 KG/M2 | SYSTOLIC BLOOD PRESSURE: 118 MMHG | OXYGEN SATURATION: 98 % | HEIGHT: 73 IN | HEART RATE: 54 BPM

## 2023-05-08 DIAGNOSIS — Z01.818 PRE-OP EXAM: Primary | ICD-10-CM

## 2023-05-08 DIAGNOSIS — I25.5 ISCHEMIC CARDIOMYOPATHY: ICD-10-CM

## 2023-05-08 DIAGNOSIS — R35.0 BENIGN PROSTATIC HYPERPLASIA WITH URINARY FREQUENCY: ICD-10-CM

## 2023-05-08 DIAGNOSIS — I25.10 ATHEROSCLEROSIS OF NATIVE CORONARY ARTERY OF NATIVE HEART WITHOUT ANGINA PECTORIS: ICD-10-CM

## 2023-05-08 DIAGNOSIS — N40.1 BENIGN PROSTATIC HYPERPLASIA WITH URINARY FREQUENCY: ICD-10-CM

## 2023-05-08 LAB
ALBUMIN SERPL BCG-MCNC: 4 G/DL (ref 3.5–5.2)
ALP SERPL-CCNC: 65 U/L (ref 40–129)
ALT SERPL W P-5'-P-CCNC: 12 U/L (ref 10–50)
ANION GAP SERPL CALCULATED.3IONS-SCNC: 8 MMOL/L (ref 7–15)
AST SERPL W P-5'-P-CCNC: 20 U/L (ref 10–50)
ATRIAL RATE - MUSE: 56 BPM
ATRIAL RATE - MUSE: 57 BPM
BILIRUB DIRECT SERPL-MCNC: 0.22 MG/DL (ref 0–0.3)
BILIRUB SERPL-MCNC: 0.8 MG/DL
BUN SERPL-MCNC: 20 MG/DL (ref 8–23)
CALCIUM SERPL-MCNC: 9.2 MG/DL (ref 8.8–10.2)
CHLORIDE SERPL-SCNC: 105 MMOL/L (ref 98–107)
CHOLEST SERPL-MCNC: 90 MG/DL
CREAT SERPL-MCNC: 1.06 MG/DL (ref 0.67–1.17)
DEPRECATED HCO3 PLAS-SCNC: 28 MMOL/L (ref 22–29)
DIASTOLIC BLOOD PRESSURE - MUSE: NORMAL MMHG
DIASTOLIC BLOOD PRESSURE - MUSE: NORMAL MMHG
ERYTHROCYTE [DISTWIDTH] IN BLOOD BY AUTOMATED COUNT: 13 % (ref 10–15)
GFR SERPL CREATININE-BSD FRML MDRD: 75 ML/MIN/1.73M2
GLUCOSE SERPL-MCNC: 118 MG/DL (ref 70–99)
HCT VFR BLD AUTO: 46.2 % (ref 40–53)
HDLC SERPL-MCNC: 32 MG/DL
HGB BLD-MCNC: 15.2 G/DL (ref 13.3–17.7)
INTERPRETATION ECG - MUSE: NORMAL
INTERPRETATION ECG - MUSE: NORMAL
LDLC SERPL CALC-MCNC: 47 MG/DL
MCH RBC QN AUTO: 28.6 PG (ref 26.5–33)
MCHC RBC AUTO-ENTMCNC: 32.9 G/DL (ref 31.5–36.5)
MCV RBC AUTO: 87 FL (ref 78–100)
NONHDLC SERPL-MCNC: 58 MG/DL
P AXIS - MUSE: 22 DEGREES
P AXIS - MUSE: 41 DEGREES
PLATELET # BLD AUTO: 198 10E3/UL (ref 150–450)
POTASSIUM SERPL-SCNC: 4.2 MMOL/L (ref 3.4–5.3)
PR INTERVAL - MUSE: 202 MS
PR INTERVAL - MUSE: 202 MS
PROT SERPL-MCNC: 6.8 G/DL (ref 6.4–8.3)
QRS DURATION - MUSE: 126 MS
QRS DURATION - MUSE: 128 MS
QT - MUSE: 440 MS
QT - MUSE: 442 MS
QTC - MUSE: 477 MS
QTC - MUSE: 478 MS
R AXIS - MUSE: -60 DEGREES
R AXIS - MUSE: 120 DEGREES
RBC # BLD AUTO: 5.32 10E6/UL (ref 4.4–5.9)
SODIUM SERPL-SCNC: 141 MMOL/L (ref 136–145)
SYSTOLIC BLOOD PRESSURE - MUSE: NORMAL MMHG
SYSTOLIC BLOOD PRESSURE - MUSE: NORMAL MMHG
T AXIS - MUSE: -10 DEGREES
T AXIS - MUSE: 73 DEGREES
TRIGL SERPL-MCNC: 56 MG/DL
VENTRICULAR RATE- MUSE: 70 BPM
VENTRICULAR RATE- MUSE: 71 BPM
WBC # BLD AUTO: 5.5 10E3/UL (ref 4–11)

## 2023-05-08 PROCEDURE — 80061 LIPID PANEL: CPT | Performed by: FAMILY MEDICINE

## 2023-05-08 PROCEDURE — 90714 TD VACC NO PRESV 7 YRS+ IM: CPT | Performed by: FAMILY MEDICINE

## 2023-05-08 PROCEDURE — 36415 COLL VENOUS BLD VENIPUNCTURE: CPT | Performed by: FAMILY MEDICINE

## 2023-05-08 PROCEDURE — 93010 ELECTROCARDIOGRAM REPORT: CPT | Performed by: INTERNAL MEDICINE

## 2023-05-08 PROCEDURE — 99214 OFFICE O/P EST MOD 30 MIN: CPT | Mod: 25 | Performed by: FAMILY MEDICINE

## 2023-05-08 PROCEDURE — 93005 ELECTROCARDIOGRAM TRACING: CPT | Performed by: FAMILY MEDICINE

## 2023-05-08 PROCEDURE — 85027 COMPLETE CBC AUTOMATED: CPT | Performed by: FAMILY MEDICINE

## 2023-05-08 PROCEDURE — 80053 COMPREHEN METABOLIC PANEL: CPT | Performed by: FAMILY MEDICINE

## 2023-05-08 PROCEDURE — 82248 BILIRUBIN DIRECT: CPT | Performed by: FAMILY MEDICINE

## 2023-05-08 PROCEDURE — 90471 IMMUNIZATION ADMIN: CPT | Performed by: FAMILY MEDICINE

## 2023-05-08 NOTE — PROGRESS NOTES
[facility]  [address] [facility] [address]  Phone: [phone]  Fax: [phone]  Primary Provider: Declan Roldan  Pre-op Performing Provider: DECLAN ROLDAN      PREOPERATIVE EVALUATION:  Today's date: 5/8/2023    Harrison Barrientos is a 72 year old male who presents for a preoperative evaluation.      5/8/2023  8:24am   Additional Questions   Roomed by OLIVIA Nguyen CMA     Surgical Information:  Surgery/Procedure: CYSTOSCOPY BIPOLAR RESECTION TRANSURETHRAL PROSTATE  Surgery Location: [facility]  Surgeon: Dr. Baldwin  Surgery Date: 5/19/2023  Time of Surgery: tbd  Where patient plans to recover: At home with family  Fax number for surgical facility: [phone]    Assessment & Plan     The proposed surgical procedure is considered INTERMEDIATE risk.    Pre-op exam  Benign prostatic hyperplasia with urinary frequency    Patient is approved for the procedure  EKG reveals sinus rhythm with a mild intraventricular conduction delay  Basic metabolic panel and hemogram with platelets are pending  Recommend holding Eliquis 3 days prior to his procedure  Recommend avoiding NSAIDs and holding OTC supplements prior to his procedure  This has been reviewed by Dr. Ohara, cardiology  Recommend resuming Eliquis when able following surgery  Follow-up as recommended by urology    - CBC with platelets  - EKG 12-lead, tracing only      Atherosclerosis of native coronary artery of native heart without angina pectoris  Ischemic cardiomyopathy    Continue plan per Dr. Ohara, cardiology  He has been treated with atorvastatin, carvedilol, lisinopril, and Eliquis  He will hold Eliquis as advised prior to his procedure    - Hepatic function panel  - Basic metabolic panel  - Lipid panel reflex to direct LDL Fasting                - No identified additional risk factors other than previously addressed    Antiplatelet or Anticoagulation Medication Instructions:  Dr. Baldwin and Dr. Ohara  have communicated that he may hold his Eliquis 3 days prior to his procedure    Additional Medication Instructions:  Patient is to take all scheduled medications on the day of surgery    RECOMMENDATION:  APPROVAL GIVEN to proceed with proposed procedure, without further diagnostic evaluation.    Review of external notes as documented elsewhere in note        Subjective     HPI related to upcoming procedure:     This is a pleasant 72-year-old male who presents to clinic for preoperative evaluation.  He has a history of benign prostatic hypertrophy and recently has had an evaluation for driss hematuria by urology with Dr. Baldwin.  He had a cystoscopy for evaluation of hematuria and given ongoing lower urinary tract symptoms he is a candidate for resection of the prostate.  He did develop a urinary tract infection with urine culture showing Proteus mirabilis.  He was treated with Bactrim and responded to treatment.  He is a candidate for the procedure as noted.     Medical history is notable for coronary artery disease, ischemic cardiomyopathy, benign prostatic hypertrophy with obstructive symptoms, and an elevated PSA as well as hyperlipidemia.       Regarding his cardiovascular history he had a previous myocardial infarction in 2004.  He had a single-vessel coronary artery disease with coronary stent placement in the proximal LAD at the time.  He has followed up with cardiology on a consistent basis for an ischemic cardiomyopathy.    A previous echocardiogram revealed an ejection fraction of 32% with mild mitral regurgitation and mild tricuspid regurgitation.    A follow-up nuclear cardiac stress test was negative for inducible myocardial ischemia in 2020.  There was evidence of a previous transmural myocardial infarction in the mid to apical segments and anteroseptal segment.  There was akinesis in the mid to apical anterior wall and distal anteroseptal segment.       He continues to follow-up with  Dr. Ohara, cardiology. In the meantime, he did have an abnormal cardiac MRI showing a moderate reduction in the left ventricular ejection fraction.  The ejection fraction was estimated to be 41%.  There was an area of nontransmural myocardial infarction.  Also, there was a small area of mural thrombus involving the distal anteroseptal and apical septal wall segments.  He has been treated with Eliquis.     Dr. Ohara, cardiology, states that patient can hold Eliquis for 3 days for the procedure.    Overall, he feels well.  He remains physically active.  He states that he recently was using a chainsaw to cut up a pine tree that fell in his backyard.        5/1/2023    12:23 PM   Preop Questions   1. Have you ever had a heart attack or stroke? YES - h/o MI   2. Have you ever had surgery on your heart or blood vessels, such as a stent placement, a coronary artery bypass, or surgery on an artery in your head, neck, heart, or legs? YES - Coronary stent placement   3. Do you have chest pain with activity? No   4. Do you have a history of  heart failure? YES - Ischemic cardiomyopathy   5. Do you currently have a cold, bronchitis or symptoms of other infection? No   6. Do you have a cough, shortness of breath, or wheezing? No   7. Do you or anyone in your family have previous history of blood clots? No   8. Do you or does anyone in your family have a serious bleeding problem such as prolonged bleeding following surgeries or cuts? No   9. Have you ever had problems with anemia or been told to take iron pills? No   10. Have you had any abnormal blood loss such as black, tarry or bloody stools? No   11. Have you ever had a blood transfusion? No   12. Are you willing to have a blood transfusion if it is medically needed before, during, or after your surgery? Yes   13. Have you or any of your relatives ever had problems with anesthesia? No   14. Do you have sleep apnea, excessive snoring or daytime drowsiness? No   15. Do you have  any artifical heart valves or other implanted medical devices like a pacemaker, defibrillator, or continuous glucose monitor? No   16. Do you have artificial joints? YES -    17. Are you allergic to latex? No       Health Care Directive:  Patient does not have a Health Care Directive or Living Will: Patient states has Advance Directive and will bring in a copy to clinic.    Preoperative Review of :   reviewed - no record of controlled substances prescribed.      Status of Chronic Conditions:  See problem list for active medical problems.  Problems all longstanding and stable, except as noted/documented.  See ROS for pertinent symptoms related to these conditions.      Review of Systems  Constitutional, neuro, ENT, endocrine, pulmonary, cardiac, gastrointestinal, genitourinary, musculoskeletal, integument and psychiatric systems are negative, except as otherwise noted.    Patient Active Problem List    Diagnosis Date Noted     Orthopedic aftercare 07/06/2022     Priority: Medium     Arthritis of left knee 12/13/2021     Priority: Medium     Adenomatous polyp of sigmoid colon 11/09/2019     Priority: Medium     Ischemic cardiomyopathy      Priority: Medium     Created by Conversion         Hypercholesterolemia      Priority: Medium     Created by Conversion         BPH (benign prostatic hyperplasia)      Priority: Medium     Created by Conversion         Diverticulosis      Priority: Medium     Created by Conversion  Replacement Utility updated for latest IMO load         Coronary Artery Disease      Priority: Medium     Created by Conversion  Replacement Utility updated for latest IMO load         Benign Prostatic Hypertrophy With Urinary Obstruction      Priority: Medium     Created by Conversion         Squamous Cell Carcinoma Of The Skin      Priority: Medium     Created by Conversion         Joint Pain, Localized In The Shoulder      Priority: Medium     Created by Conversion         Fatigue      Priority:  Medium     Created by Conversion         Neck Pain      Priority: Medium     Created by Conversion         Serology Prostate-specific Antigen (PSA) Elevated      Priority: Medium     Created by Conversion          Past Medical History:   Diagnosis Date     Antiplatelet or antithrombotic long-term use      Arthritis      BPH (benign prostatic hyperplasia)      Coronary artery disease      Diverticulosis      Hyperlipidemia      Hypertension      Ischemic cardiomyopathy      Myocardial infarction (H) 2004     Prostate hyperplasia, benign localized, without urinary obstruction      Squamous cell carcinoma of skin      Stented coronary artery      Past Surgical History:   Procedure Laterality Date     ARTHROPLASTY KNEE Right 12/13/2021    Procedure: RIGHT TOTAL KNEE ARTHROPLASTY;  Surgeon: Magdi Estrada MD;  Location: [facility]     BIOPSY  2016     CORONARY STENT PLACEMENT       CYSTOSCOPY PROSTATE W/ LASER N/A 11/29/2016    Procedure: CYSTOSCOPY WITH TRANSURETHRAL RESECTION OF THE PROSTATE WITH QUANTA LASER;  Surgeon: Tre Cuadra MD;  Location: [facility];  Service:      EXCHANGE POLY COMPONENT ARTHROPLASTY KNEE Right 07/06/2022    Procedure: RIGHT TOTAL KNEE SCAR EXCISION;  Surgeon: Magdi Estrada MD;  Location: [facility]     GENITOURINARY SURGERY  2017     Current Outpatient Medications   Medication Sig Dispense Refill     atorvastatin (LIPITOR) 40 MG tablet TAKE ONE TABLET BY MOUTH ONCE DAILY 90 tablet 3     carvedilol (COREG) 12.5 MG tablet TAKE ONE TABLET BY MOUTH TWICE A DAY WITH MEALS 180 tablet 0     ELIQUIS ANTICOAGULANT 5 MG tablet TAKE ONE TABLET BY MOUTH TWICE A  tablet 1     ibuprofen (ADVIL/MOTRIN) 200 MG tablet Take 200 mg by mouth every 4 hours as needed for pain       lisinopril (ZESTRIL) 10 MG tablet TAKE 1 TABLET (10 MG TOTAL) BY MOUTH DAILY. 90 tablet 3     loratadine (CLARITIN) 10 mg tablet [LORATADINE (CLARITIN) 10 MG TABLET] Take 10 mg by mouth  "daily as needed.        acetaminophen (TYLENOL) 325 MG tablet Take 2 tablets (650 mg) by mouth every 4 hours as needed for other (For optimal non-opioid multimodal pain management to improve pain control.) (Patient not taking: Reported on 5/8/2023) 60 tablet 0       No Known Allergies     Social History     Tobacco Use     Smoking status: Never     Smokeless tobacco: Never   Vaping Use     Vaping status: Never Used   Substance Use Topics     Alcohol use: Not Currently     Comment: rare     Family History   Problem Relation Age of Onset     Heart Disease Mother         coronary stents     Coronary Artery Disease Mother      Hypertension Mother      Benign prostatic hyperplasia Father      Heart Disease Father      Kidney Cancer Father      Coronary Artery Disease Father      Benign prostatic hyperplasia Paternal Uncle      Benign prostatic hyperplasia Paternal Grandfather      History   Drug Use No         Objective     /60   Pulse 54   Ht 1.854 m (6' 1\")   Wt 95.3 kg (210 lb)   SpO2 98%   BMI 27.71 kg/m      Physical Exam    GENERAL APPEARANCE: healthy, alert and no distress     EYES: EOMI,  PERRL     HENT: ear canals and TM's normal and nose and mouth without ulcers or lesions     NECK: no adenopathy, no asymmetry, masses, or scars and thyroid normal to palpation     RESP: lungs clear to auscultation - no rales, rhonchi or wheezes     CV: regular rates and rhythm, normal S1 S2, no S3 or S4 and no murmur, click or rub     MS: extremities normal- no gross deformities noted, no evidence of inflammation in joints, FROM in all extremities.     SKIN: no suspicious lesions or rashes     NEURO: Normal strength and tone, sensory exam grossly normal, mentation intact and speech normal     PSYCH: mentation appears normal. and affect normal/bright     LYMPHATICS: No cervical adenopathy    Recent Labs   Lab Test 03/20/23  1436 07/07/22  0744 06/29/22  1210 03/02/22  1621 12/14/21  1030 12/10/21  1124   HGB  --  14.1 " 14.8 14.5   < > 16.1   PLT  --   --  186 248   < > 208   INR  --   --   --   --   --  1.06   NA  --  138 142 142   < > 138   POTASSIUM  --  4.6 4.7 4.4   < > 4.5   CR 1.2 0.89 0.94 0.80   < > 0.78    < > = values in this interval not displayed.        Diagnostics:  No results found for this or any previous visit (from the past 168 hour(s)).   EKG: Sinus rhythm with an intraventricular conduction delay    Revised Cardiac Risk Index (RCRI):  The patient has the following serious cardiovascular risks for perioperative complications:   - Coronary Artery Disease (MI, positive stress test, angina, Qs on EKG) = 1 point   - Congestive Heart Failure (pulmonary edema, PND, s3 kerri, CXR with pulmonary congestion, basilar rales) = 1 point     RCRI Interpretation: 2 points: Class III (moderate risk - 6.6% complication rate)     Estimated Functional Capacity: Performs 4 METS exercise without symptoms (e.g., light housework, stairs, 4 mph walk, 7 mph bike, slow step dance)           Signed Electronically by: Brad Roldan MD  Copy of this evaluation report is provided to requesting physician.

## 2023-05-08 NOTE — PATIENT INSTRUCTIONS
For informational purposes only. Not to replace the advice of your health care provider. Copyright   2003,  [facility]. All rights reserved. Clinically reviewed by Sofiya Ramos MD. [employer] [id number] - REV .  Preparing for Your Surgery  Getting started  A nurse will call you to review your health history and instructions. They will give you an arrival time based on your scheduled surgery time. Please be ready to share:  Your doctor's clinic name and phone number  Your medical, surgical, and anesthesia history  A list of allergies and sensitivities  A list of medicines, including herbal treatments and over-the-counter drugs  Whether the patient has a legal guardian (ask how to send us the papers in advance)  Please tell us if you're pregnant--or if there's any chance you might be pregnant. Some surgeries may injure a fetus (unborn baby), so they require a pregnancy test. Surgeries that are safe for a fetus don't always need a test, and you can choose whether to have one.   If you have a child who's having surgery, please ask for a copy of Preparing for Your Child's Surgery.    Preparing for surgery  Within 10 to 30 days of surgery: Have a pre-op exam (sometimes called an H&P, or History and Physical). This can be done at a clinic or pre-operative center.  If you're having a , you may not need this exam. Talk to your care team.  At your pre-op exam, talk to your care team about all medicines you take. If you need to stop any medicines before surgery, ask when to start taking them again.  We do this for your safety. Many medicines can make you bleed too much during surgery. Some change how well surgery (anesthesia) drugs work.  Call your insurance company to let them know you're having surgery. (If you don't have insurance, call [phone].)  Call your clinic if there's any change in your health. This includes signs of a cold or flu (sore throat, runny nose, cough, rash, fever). It  also includes a scrape or scratch near the surgery site.  If you have questions on the day of surgery, call your hospital or surgery center.  Eating and drinking guidelines  For your safety: Unless your surgeon tells you otherwise, follow the guidelines below.  Eat and drink as usual until 8 hours before you arrive for surgery. After that, no food or milk.  Drink clear liquids until 2 hours before you arrive. These are liquids you can see through, like water, Gatorade, and Propel Water. They also include plain black coffee and tea (no cream or milk), candy, and breath mints. You can spit out gum when you arrive.  If you drink alcohol: Stop drinking it the night before surgery.  If your care team tells you to take medicine on the morning of surgery, it's okay to take it with a sip of water.  Preventing infection  Shower or bathe the night before and morning of your surgery. Follow the instructions your clinic gave you. (If no instructions, use regular soap.)  Don't shave or clip hair near your surgery site. We'll remove the hair if needed.  Don't smoke or vape the morning of surgery. You may chew nicotine gum up to 2 hours before surgery. A nicotine patch is okay.  Note: Some surgeries require you to completely quit smoking and nicotine. Check with your surgeon.  Your care team will make every effort to keep you safe from infection. We will:  Clean our hands often with soap and water (or an alcohol-based hand rub).  Clean the skin at your surgery site with a special soap that kills germs.  Give you a special gown to keep you warm. (Cold raises the risk of infection.)  Wear special hair covers, masks, gowns and gloves during surgery.  Give antibiotic medicine, if prescribed. Not all surgeries need antibiotics.  What to bring on the day of surgery  Photo ID and insurance card  Copy of your health care directive, if you have one  Glasses and hearing aids (bring cases)  You can't wear contacts during surgery  Inhaler and  eye drops, if you use them (tell us about these when you arrive)  CPAP machine or breathing device, if you use them  A few personal items, if spending the night  If you have . . .  A pacemaker, ICD (cardiac defibrillator) or other implant: Bring the ID card.  An implanted stimulator: Bring the remote control.  A legal guardian: Bring a copy of the certified (court-stamped) guardianship papers.  Please remove any jewelry, including body piercings. Leave jewelry and other valuables at home.  If you're going home the day of surgery  You must have a responsible adult drive you home. They should stay with you overnight as well.  If you don't have someone to stay with you, and you aren't safe to go home alone, we may keep you overnight. Insurance often won't pay for this.  After surgery  If it's hard to control your pain or you need more pain medicine, please call your surgeon's office.  Questions?   If you have any questions for your care team, list them here: _________________________________________________________________________________________________________________________________________________________________________ ____________________________________ ____________________________________ ____________________________________    How to Take Your Medication Before Surgery  - HOLD (do not take) Eliquis 3 days prior to your procedure

## 2023-05-30 ENCOUNTER — OFFICE VISIT (OUTPATIENT)
Dept: FAMILY MEDICINE | Facility: CLINIC | Age: 73
End: 2023-05-30
Payer: COMMERCIAL

## 2023-05-30 VITALS
DIASTOLIC BLOOD PRESSURE: 68 MMHG | BODY MASS INDEX: 27.43 KG/M2 | HEIGHT: 73 IN | OXYGEN SATURATION: 96 % | TEMPERATURE: 97.9 F | RESPIRATION RATE: 16 BRPM | SYSTOLIC BLOOD PRESSURE: 130 MMHG | WEIGHT: 207 LBS | HEART RATE: 57 BPM

## 2023-05-30 DIAGNOSIS — N40.1 BENIGN PROSTATIC HYPERPLASIA WITH URINARY FREQUENCY: Primary | ICD-10-CM

## 2023-05-30 DIAGNOSIS — R35.0 BENIGN PROSTATIC HYPERPLASIA WITH URINARY FREQUENCY: Primary | ICD-10-CM

## 2023-05-30 DIAGNOSIS — K64.4 EXTERNAL HEMORRHOIDS: ICD-10-CM

## 2023-05-30 PROCEDURE — 99214 OFFICE O/P EST MOD 30 MIN: CPT | Performed by: FAMILY MEDICINE

## 2023-05-30 RX ORDER — ACETAMINOPHEN 325 MG/1
325-650 TABLET ORAL PRN
COMMUNITY

## 2023-05-30 RX ORDER — HYDROCORTISONE 25 MG/G
CREAM TOPICAL 2 TIMES DAILY PRN
Qty: 30 G | Refills: 0 | Status: SHIPPED | OUTPATIENT
Start: 2023-05-30 | End: 2023-07-26

## 2023-05-30 NOTE — PATIENT INSTRUCTIONS
Chao,    Make sure to drink plenty of liquids  Continue your fiber supplement  You may try MiraLAX which will help you avoid constipation  Soaking in a warm tub can be helpful  I sent a prescription for Anusol which can be helpful for discomfort  If not improving you may follow-up with a colorectal specialist  Continue to follow-up with urology as planned    Brad Roldan MD

## 2023-05-30 NOTE — PROGRESS NOTES
Assessment & Plan     Benign prostatic hyperplasia with urinary frequency    Status post transurethral resection of the prostate  Continue plan per urology  Follow-up as planned    External hemorrhoids    Recommend symptomatic treatment including warm compresses or sitz bath's  Prescribed Anusol to use as needed  Recommend avoiding constipation  He will try MiraLAX  Recommend follow-up with colorectal surgery if not improving    - Adult Colorectal Surgery  Referral  - hydrocortisone, Perianal, (HYDROCORTISONE) 2.5 % cream  Dispense: 30 g; Refill: 0      Review of external notes as documented elsewhere in note       MED REC REQUIRED  Post Medication Reconciliation Status: discharge medications reconciled, continue medications without change      Brad Roldan MD  [facility]    Price Terry is a 72 year old, presenting for the following health issues:  Hospital F/U    This is a pleasant 72-year-old male who presents to the clinic following recent prostate procedure.  He previously had a cystoscopy for evaluation hematuria and therefore was a candidate for resection of the prostate.  Following the original cystoscopy he developed a urinary tract infection which was treated.  He has had his procedure and generally has been doing well.  Following the prostate resection he had some blood in his urine but that has resolved.  He denies burning with urination, fever, or chills.    A more recent concern has been some constipation.  He has noted that a hemorrhoid has increased in size.  This can be tender.  He has had occasional stomach discomfort.  He has used Tucks pads and use preparation H.    His other history was reviewed.      Medical history is notable for coronary artery disease, ischemic cardiomyopathy, benign prostatic hypertrophy with obstructive symptoms, and an elevated PSA as well as hyperlipidemia.       Regarding his cardiovascular history he had a previous  myocardial infarction in 2004.  He had a single-vessel coronary artery disease with coronary stent placement in the proximal LAD at the time.  He has followed up with cardiology on a consistent basis for an ischemic cardiomyopathy.    A previous echocardiogram revealed an ejection fraction of 32% with mild mitral regurgitation and mild tricuspid regurgitation.    A follow-up nuclear cardiac stress test was negative for inducible myocardial ischemia in 2020.  There was evidence of a previous transmural myocardial infarction in the mid to apical segments and anteroseptal segment.  There was akinesis in the mid to apical anterior wall and distal anteroseptal segment.       He continues to follow-up with Dr. Ohara, cardiology. In the meantime, he did have an abnormal cardiac MRI showing a moderate reduction in the left ventricular ejection fraction.  The ejection fraction was estimated to be 41%.  There was an area of nontransmural myocardial infarction.  Also, there was a small area of mural thrombus involving the distal anteroseptal and apical septal wall segments.  He has been treated with Eliquis.         5/30/2023     9:37 AM   Additional Questions   Roomed by Stormy VITALE CMA     [facility]       Hospital Follow-up Visit:    Hospital/Nursing Home/ Rehab Facility: [facility]   Date of Admission: 5/19/2023  Date of Discharge: 5/20/2023  Reason(s) for Admission: prostate surgery     Was your hospitalization related to COVID-19? No   Problems taking medications regularly:  None  Medication changes since discharge: None  Problems adhering to non-medication therapy:  None    Summary of hospitalization:  [facility] discharge summary reviewed  Diagnostic Tests/Treatments reviewed.  Follow up needed: Urology notes  Other Healthcare Providers Involved in Patient s Care:         None  Update since discharge: improved.         Plan of care communicated with patient                   Review of Systems  "  Constitutional, HEENT, cardiovascular, pulmonary, GI, , musculoskeletal, neuro, skin, endocrine and psych systems are negative, except as otherwise noted.      Objective    /68 (BP Location: Left arm, Patient Position: Sitting, Cuff Size: Adult Large)   Pulse 57   Temp 97.9  F (36.6  C) (Oral)   Resp 16   Ht 1.854 m (6' 1\")   Wt 93.9 kg (207 lb)   SpO2 96%   BMI 27.31 kg/m    Body mass index is 27.31 kg/m .  Physical Exam   GENERAL: healthy, alert and no distress  EYES: Eyes grossly normal to inspection, PERRL and conjunctivae and sclerae normal  RESP: lungs clear to auscultation - no rales, rhonchi or wheezes  CV: regular rate and rhythm, normal S1 S2, no S3 or S4, no murmur, click or rub, no peripheral edema and peripheral pulses strong  RECTAL: A large external hemorrhoid is noted  MS: no gross musculoskeletal defects noted, no edema  SKIN: no suspicious lesions or rashes  NEURO: Normal strength and tone, mentation intact and speech normal  PSYCH: mentation appears normal, affect normal/bright                    "

## 2023-06-01 DIAGNOSIS — I25.5 ISCHEMIC CARDIOMYOPATHY: ICD-10-CM

## 2023-06-01 RX ORDER — CARVEDILOL 12.5 MG/1
TABLET ORAL
Qty: 180 TABLET | Refills: 3 | Status: SHIPPED | OUTPATIENT
Start: 2023-06-01 | End: 2024-05-13

## 2023-06-02 NOTE — TELEPHONE ENCOUNTER
Diagnosis, Referred by & from: External Hemorrhoids   Appt date: 8/17/2023   NOTES STATUS DETAILS   OFFICE NOTE from referring provider Internal [place] [facility] [place]:  5/30/23, 5/8/23 - PCC OV with Dr. Roldan   OFFICE NOTE from other specialist [facility]:  5/23/23, 3/27/23 - URO OV with Dr. Baldwin   DISCHARGE SUMMARY from hospital N/A    DISCHARGE REPORT from the ER N/A    OPERATIVE REPORT Care Everywhere / Internal [facility]:  5/19/23 - OP Note for CYSTOSCOPY with Dr. Baldwin    [facility]:  11/29/16 - OP Note for CYSTOSCOPY with Dr. Cuadra   MEDICATION LIST Internal    LABS     ANAL PAP/CEA N/A    BIOPSIES/PATHOLOGY RELATED TO DIAGNOSIS Care Everywhere [facility]:  5/19/23 - Prostate Biopsy (Case: [id number])   DIAGNOSTIC PROCEDURES     PFC TESTING (from the Pelvic floor center includes Manometry, PDNL, EMG, etc.) N/A    COLONOSCOPY Received [facility]:  10/25/19 - Colonoscopy   UPPER ENDOSCOPY (EGD) N/A    FLEX SIGMOIDOSCOPY N/A    ERCP N/A    IMAGING (DISC & REPORT)      CT Internal [facility]:  3/20/23 - CT Urogram   MRI N/A    XRAY N/A    ULTRASOUND  (ENDOANAL/ENDORECTAL) N/A

## 2023-06-04 DIAGNOSIS — I51.3 MURAL THROMBUS OF HEART: ICD-10-CM

## 2023-06-04 RX ORDER — APIXABAN 5 MG/1
TABLET, FILM COATED ORAL
Qty: 180 TABLET | Refills: 1 | Status: SHIPPED | OUTPATIENT
Start: 2023-06-04 | End: 2023-07-26

## 2023-06-04 NOTE — TELEPHONE ENCOUNTER
Last Written Prescription Date:  12/16/22  Last Fill Quantity: 180,  # refills: 1   Last office visit provider:  5/30/23     Requested Prescriptions   Pending Prescriptions Disp Refills     ELIQUIS ANTICOAGULANT 5 MG tablet [Pharmacy Med Name: ELIQUIS 5MG TABS] 180 tablet 1     Sig: TAKE ONE TABLET BY MOUTH TWICE A DAY       Direct Oral Anticoagulant Agents Passed - 6/4/2023  5:02 AM        Passed - Normal Platelets on file in past 12 months     Recent Labs   Lab Test 05/08/23  0921                  Passed - Medication is active on med list        Passed - Patient is 18-79 years of age        Passed - Serum creatinine less than or equal to 1.4 on file in past 12 mos     Recent Labs   Lab Test 05/08/23  0921   CR 1.06       Ok to refill medication if creatinine is low          Passed - Weight is greater than 60 kg for the past year     Wt Readings from Last 3 Encounters:   05/30/23 93.9 kg (207 lb)   05/08/23 95.3 kg (210 lb)   04/05/23 95.7 kg (211 lb)             Passed - Recent (6 mo) or future (30 days) visit within the authorizing provider's specialty             Kelsi Glass 06/04/23 10:35 AM

## 2023-07-05 ENCOUNTER — TRANSFERRED RECORDS (OUTPATIENT)
Dept: HEALTH INFORMATION MANAGEMENT | Facility: CLINIC | Age: 73
End: 2023-07-05
Payer: COMMERCIAL

## 2023-07-26 ENCOUNTER — OFFICE VISIT (OUTPATIENT)
Dept: CARDIOLOGY | Facility: CLINIC | Age: 73
End: 2023-07-26
Payer: COMMERCIAL

## 2023-07-26 VITALS
RESPIRATION RATE: 16 BRPM | SYSTOLIC BLOOD PRESSURE: 128 MMHG | DIASTOLIC BLOOD PRESSURE: 64 MMHG | WEIGHT: 207 LBS | HEART RATE: 55 BPM | HEIGHT: 73 IN | BODY MASS INDEX: 27.43 KG/M2

## 2023-07-26 DIAGNOSIS — I25.5 ISCHEMIC CARDIOMYOPATHY: ICD-10-CM

## 2023-07-26 DIAGNOSIS — I51.3 MURAL THROMBUS OF HEART: ICD-10-CM

## 2023-07-26 PROCEDURE — 99214 OFFICE O/P EST MOD 30 MIN: CPT | Performed by: INTERNAL MEDICINE

## 2023-07-26 NOTE — PROGRESS NOTES
HEART CARE ENCOUNTER CONSULTATON NOTE      [facility]  [phone]      Assessment/Recommendations   Assessment:  1.  Coronary artery disease: Status post anterior myocardial infarction in 2004.   No anginal complaints.   2.  Hypertension: Well-controlled today  3.  Dyslipidemia: Recent lipids reviewed and well controlled  4.  Ischemic cardiomyopathy: Left ventricular ejection fraction 40%  5.  Intracardiac thrombus: Small mural thrombus noted on cardiac MRI      Plan:  1.  Continue Eliquis  2.  Continue on high-dose statin therapy, carvedilol and lisinopril  Follow-up in a year       History of Present Illness/Subjective    HPI: Harrison Barrientos is a 72 year old male with history of ischemic cardiomyopathy with left ventricular ejection fraction of 40% status post anterior MI in 2004 with persistent mural thrombus who is here for follow-up.  He underwent prostate surgery back in May.  He still has some hematuria but it has been improving with time.  He is very active.  Does a lot of yard work and exercise in his recumbent bike.  Since his right knee replacement he still has issues with mobility and difficulty with bending his knee at a 90 degree angle.  He stays active with regular exercise in the morning.  He has a 12-year-old grandson he is very close to.    Cardiac MR 5/12/22  1. Normal left ventricular size with moderate reduction in left ventricular systolic function. The  quantified left ventricular ejection fraction is 41%. There is akinesis of the entire apex, distal  anteroseptum and distal anterior wall segment.    Severe hypokinesis of the anteroseptal wall and mid  anterior wall segments.  2.  Non-transmural myocardial infarction involving the basal to mid anteroseptal and mid anterior wall  involving 50% of myocardium representing possibly viable myocardium.  Transmural myocardial infarction  involving distal anteroseptal, apical anterior wall and entire apex of the left ventricle  "representing  nonviable myocardium.    3. Very small area of mural thrombus involving the distal anteroseptal and apical septal wall segments  which is grossly .  4.  Normal right ventricular size and systolic function.   5.  There is mild aortic regurgitation.   6.  Mild ascending aortic enlargement at 42 mm (prior 41).  Mild aortic root enlargement at 42 mm (prior 41  mm).       Compared to prior study on 12/2/2021, there remain a very small area of chronic mural thrombus over the  distal anteroseptal wall which has decrease in size.  No change in ischemic cardiomyopathy or left  ventricular systolic function.             Physical Examination  Review of Systems   Vitals: /64 (BP Location: Left arm, Patient Position: Sitting, Cuff Size: Adult Large)   Pulse 55   Resp 16   Ht 1.854 m (6' 1\")   Wt 93.9 kg (207 lb)   BMI 27.31 kg/m    BMI= Body mass index is 27.31 kg/m .  Wt Readings from Last 3 Encounters:   07/26/23 93.9 kg (207 lb)   05/30/23 93.9 kg (207 lb)   05/08/23 95.3 kg (210 lb)       General Appearance:   no distress, normal body habitus   ENT/Mouth: membranes moist, no oral lesions or bleeding gums.      EYES:  no scleral icterus, normal conjunctivae   Neck: no carotid bruits or thyromegaly   Chest/Lungs:   lungs are clear to auscultation   Cardiovascular:   Regular. Normal first and second heart sounds with no murmurs no edema bilaterally    Abdomen:  bowel sounds are present   Extremities: no cyanosis or clubbing   Skin: no xanthelasma, warm.    Neurologic: normal  bilateral, no tremors     Psychiatric: alert and oriented x3, calm        Please refer above for cardiac ROS details.        Medical History  Surgical History Family History Social History   Past Medical History:   Diagnosis Date    Antiplatelet or antithrombotic long-term use     Arthritis     BPH (benign prostatic hyperplasia)     Coronary artery disease     Diverticulosis     Hyperlipidemia     Hypertension     Ischemic " cardiomyopathy     Myocardial infarction (H) 2004    Prostate hyperplasia, benign localized, without urinary obstruction     Squamous cell carcinoma of skin     Stented coronary artery      Past Surgical History:   Procedure Laterality Date    ARTHROPLASTY KNEE Right 12/13/2021    Procedure: RIGHT TOTAL KNEE ARTHROPLASTY;  Surgeon: Magdi Estrada MD;  Location: [facility]    BIOPSY  2016    CORONARY STENT PLACEMENT      CYSTOSCOPY PROSTATE W/ LASER N/A 11/29/2016    Procedure: CYSTOSCOPY WITH TRANSURETHRAL RESECTION OF THE PROSTATE WITH QUANTA LASER;  Surgeon: Tre Cuadra MD;  Location: [facility];  Service:     EXCHANGE POLY COMPONENT ARTHROPLASTY KNEE Right 07/06/2022    Procedure: RIGHT TOTAL KNEE SCAR EXCISION;  Surgeon: Magdi Estrada MD;  Location: [facility]    GENITOURINARY SURGERY  2017     Family History   Problem Relation Age of Onset    Heart Disease Mother         coronary stents    Coronary Artery Disease Mother     Hypertension Mother     Benign prostatic hyperplasia Father     Heart Disease Father     Kidney Cancer Father     Coronary Artery Disease Father     Benign prostatic hyperplasia Paternal Uncle     Benign prostatic hyperplasia Paternal Grandfather         Social History     Socioeconomic History    Marital status:      Spouse name: Not on file    Number of children: Not on file    Years of education: Not on file    Highest education level: Not on file   Occupational History    Not on file   Tobacco Use    Smoking status: Never    Smokeless tobacco: Never   Vaping Use    Vaping Use: Never used   Substance and Sexual Activity    Alcohol use: Not Currently     Comment: rare    Drug use: No    Sexual activity: Yes     Partners: Female     Birth control/protection: None   Other Topics Concern    Parent/sibling w/ CABG, MI or angioplasty before 65F 55M? Yes     Comment: Father   Social History Narrative    Not on file     Social Determinants of Health      Financial Resource Strain: Not on file   Food Insecurity: Not on file   Transportation Needs: Not on file   Physical Activity: Not on file   Stress: Not on file   Social Connections: Not on file   Intimate Partner Violence: Not on file   Housing Stability: Not on file           Medications  Allergies   Current Outpatient Medications   Medication Sig Dispense Refill    acetaminophen (TYLENOL) 325 MG tablet Take 325-650 mg by mouth as needed for pain      apixaban ANTICOAGULANT (ELIQUIS ANTICOAGULANT) 5 MG tablet Take 1 tablet (5 mg) by mouth 2 times daily 180 tablet 3    atorvastatin (LIPITOR) 40 MG tablet TAKE ONE TABLET BY MOUTH ONCE DAILY 90 tablet 3    carvedilol (COREG) 12.5 MG tablet TAKE ONE TABLET BY MOUTH TWICE A DAY WITH MEALS. INSTRUCT PATIENT TO SCHEDULE YEARLY FOLLOW-UP FOR FURTHER REFILLS. 180 tablet 3    lisinopril (ZESTRIL) 10 MG tablet TAKE 1 TABLET (10 MG TOTAL) BY MOUTH DAILY. 90 tablet 3    loratadine (CLARITIN) 10 mg tablet [LORATADINE (CLARITIN) 10 MG TABLET] Take 10 mg by mouth daily as needed.        No Known Allergies       Lab Results    Chemistry/lipid CBC Cardiac Enzymes/BNP/TSH/INR   Recent Labs   Lab Test 05/08/23  0921   CHOL 90   HDL 32*   LDL 47   TRIG 56     Recent Labs   Lab Test 05/08/23  0921 12/14/20  1558 03/22/19  1140   LDL 47 49 52     Recent Labs   Lab Test 05/08/23  0921      POTASSIUM 4.2   CHLORIDE 105   CO2 28   *   BUN 20.0   CR 1.06   GFRESTIMATED 75   RENE 9.2     Recent Labs   Lab Test 05/08/23  0921 03/20/23  1436 07/07/22  0744   CR 1.06 1.2 0.89     No results for input(s): A1C in the last 19939 hours.       Recent Labs   Lab Test 05/08/23  0921   WBC 5.5   HGB 15.2   HCT 46.2   MCV 87        Recent Labs   Lab Test 05/08/23  0921 07/07/22  0744 06/29/22  1210   HGB 15.2 14.1 14.8    No results for input(s): TROPONINI in the last 69046 hours.  No results for input(s): BNP, NTBNPI, NTBNP in the last 03061 hours.  No results for input(s): TSH  in the last 02103 hours.  Recent Labs   Lab Test 12/10/21  1124   INR 1.06        Marline Ohara MD

## 2023-07-26 NOTE — LETTER
7/26/2023    Brad Roldan MD  [address]  [facility] [address]    RE: Harrison Barrientos       Dear Colleague,     I had the pleasure of seeing Harrison Barrientos in the [facility].    HEART CARE ENCOUNTER CONSULTATON NOTE      [facility]  [phone]      Assessment/Recommendations   Assessment:  1.  Coronary artery disease: Status post anterior myocardial infarction in 2004.   No anginal complaints.   2.  Hypertension: Well-controlled today  3.  Dyslipidemia: Recent lipids reviewed and well controlled  4.  Ischemic cardiomyopathy: Left ventricular ejection fraction 40%  5.  Intracardiac thrombus: Small mural thrombus noted on cardiac MRI      Plan:  1.  Continue Eliquis  2.  Continue on high-dose statin therapy, carvedilol and lisinopril  Follow-up in a year       History of Present Illness/Subjective    HPI: Harrison Barrientos is a 72 year old male with history of ischemic cardiomyopathy with left ventricular ejection fraction of 40% status post anterior MI in 2004 with persistent mural thrombus who is here for follow-up.  He underwent prostate surgery back in May.  He still has some hematuria but it has been improving with time.  He is very active.  Does a lot of yard work and exercise in his recumbent bike.  Since his right knee replacement he still has issues with mobility and difficulty with bending his knee at a 90 degree angle.  He stays active with regular exercise in the morning.  He has a 12-year-old grandson he is very close to.    Cardiac MR 5/12/22  1. Normal left ventricular size with moderate reduction in left ventricular systolic function. The  quantified left ventricular ejection fraction is 41%. There is akinesis of the entire apex, distal  anteroseptum and distal anterior wall segment.    Severe hypokinesis of the anteroseptal wall and mid  anterior wall segments.  2.  Non-transmural myocardial infarction involving the basal to mid anteroseptal and mid  "anterior wall  involving 50% of myocardium representing possibly viable myocardium.  Transmural myocardial infarction  involving distal anteroseptal, apical anterior wall and entire apex of the left ventricle representing  nonviable myocardium.    3. Very small area of mural thrombus involving the distal anteroseptal and apical septal wall segments  which is grossly .  4.  Normal right ventricular size and systolic function.   5.  There is mild aortic regurgitation.   6.  Mild ascending aortic enlargement at 42 mm (prior 41).  Mild aortic root enlargement at 42 mm (prior 41  mm).       Compared to prior study on 12/2/2021, there remain a very small area of chronic mural thrombus over the  distal anteroseptal wall which has decrease in size.  No change in ischemic cardiomyopathy or left  ventricular systolic function.             Physical Examination  Review of Systems   Vitals: /64 (BP Location: Left arm, Patient Position: Sitting, Cuff Size: Adult Large)   Pulse 55   Resp 16   Ht 1.854 m (6' 1\")   Wt 93.9 kg (207 lb)   BMI 27.31 kg/m    BMI= Body mass index is 27.31 kg/m .  Wt Readings from Last 3 Encounters:   07/26/23 93.9 kg (207 lb)   05/30/23 93.9 kg (207 lb)   05/08/23 95.3 kg (210 lb)       General Appearance:   no distress, normal body habitus   ENT/Mouth: membranes moist, no oral lesions or bleeding gums.      EYES:  no scleral icterus, normal conjunctivae   Neck: no carotid bruits or thyromegaly   Chest/Lungs:   lungs are clear to auscultation   Cardiovascular:   Regular. Normal first and second heart sounds with no murmurs no edema bilaterally    Abdomen:  bowel sounds are present   Extremities: no cyanosis or clubbing   Skin: no xanthelasma, warm.    Neurologic: normal  bilateral, no tremors     Psychiatric: alert and oriented x3, calm        Please refer above for cardiac ROS details.        Medical History  Surgical History Family History Social History   Past Medical History: "   Diagnosis Date    Antiplatelet or antithrombotic long-term use     Arthritis     BPH (benign prostatic hyperplasia)     Coronary artery disease     Diverticulosis     Hyperlipidemia     Hypertension     Ischemic cardiomyopathy     Myocardial infarction (H) 2004    Prostate hyperplasia, benign localized, without urinary obstruction     Squamous cell carcinoma of skin     Stented coronary artery      Past Surgical History:   Procedure Laterality Date    ARTHROPLASTY KNEE Right 12/13/2021    Procedure: RIGHT TOTAL KNEE ARTHROPLASTY;  Surgeon: Magdi Estrada MD;  Location: [facility]    BIOPSY  2016    CORONARY STENT PLACEMENT      CYSTOSCOPY PROSTATE W/ LASER N/A 11/29/2016    Procedure: CYSTOSCOPY WITH TRANSURETHRAL RESECTION OF THE PROSTATE WITH QUANTA LASER;  Surgeon: Tre Cuadra MD;  Location: [facility];  Service:     EXCHANGE POLY COMPONENT ARTHROPLASTY KNEE Right 07/06/2022    Procedure: RIGHT TOTAL KNEE SCAR EXCISION;  Surgeon: Magdi Estrada MD;  Location: [facility]    GENITOURINARY SURGERY  2017     Family History   Problem Relation Age of Onset    Heart Disease Mother         coronary stents    Coronary Artery Disease Mother     Hypertension Mother     Benign prostatic hyperplasia Father     Heart Disease Father     Kidney Cancer Father     Coronary Artery Disease Father     Benign prostatic hyperplasia Paternal Uncle     Benign prostatic hyperplasia Paternal Grandfather         Social History     Socioeconomic History    Marital status:      Spouse name: Not on file    Number of children: Not on file    Years of education: Not on file    Highest education level: Not on file   Occupational History    Not on file   Tobacco Use    Smoking status: Never    Smokeless tobacco: Never   Vaping Use    Vaping Use: Never used   Substance and Sexual Activity    Alcohol use: Not Currently     Comment: rare    Drug use: No    Sexual activity: Yes     Partners: Female      Birth control/protection: None   Other Topics Concern    Parent/sibling w/ CABG, MI or angioplasty before 65F 55M? Yes     Comment: Father   Social History Narrative    Not on file     Social Determinants of Health     Financial Resource Strain: Not on file   Food Insecurity: Not on file   Transportation Needs: Not on file   Physical Activity: Not on file   Stress: Not on file   Social Connections: Not on file   Intimate Partner Violence: Not on file   Housing Stability: Not on file           Medications  Allergies   Current Outpatient Medications   Medication Sig Dispense Refill    acetaminophen (TYLENOL) 325 MG tablet Take 325-650 mg by mouth as needed for pain      apixaban ANTICOAGULANT (ELIQUIS ANTICOAGULANT) 5 MG tablet Take 1 tablet (5 mg) by mouth 2 times daily 180 tablet 3    atorvastatin (LIPITOR) 40 MG tablet TAKE ONE TABLET BY MOUTH ONCE DAILY 90 tablet 3    carvedilol (COREG) 12.5 MG tablet TAKE ONE TABLET BY MOUTH TWICE A DAY WITH MEALS. INSTRUCT PATIENT TO SCHEDULE YEARLY FOLLOW-UP FOR FURTHER REFILLS. 180 tablet 3    lisinopril (ZESTRIL) 10 MG tablet TAKE 1 TABLET (10 MG TOTAL) BY MOUTH DAILY. 90 tablet 3    loratadine (CLARITIN) 10 mg tablet [LORATADINE (CLARITIN) 10 MG TABLET] Take 10 mg by mouth daily as needed.        No Known Allergies       Lab Results    Chemistry/lipid CBC Cardiac Enzymes/BNP/TSH/INR   Recent Labs   Lab Test 05/08/23 0921   CHOL 90   HDL 32*   LDL 47   TRIG 56     Recent Labs   Lab Test 05/08/23 0921 12/14/20  1558 03/22/19  1140   LDL 47 49 52     Recent Labs   Lab Test 05/08/23 0921      POTASSIUM 4.2   CHLORIDE 105   CO2 28   *   BUN 20.0   CR 1.06   GFRESTIMATED 75   RENE 9.2     Recent Labs   Lab Test 05/08/23 0921 03/20/23  1436 07/07/22  0744   CR 1.06 1.2 0.89     No results for input(s): A1C in the last 55436 hours.       Recent Labs   Lab Test 05/08/23 0921   WBC 5.5   HGB 15.2   HCT 46.2   MCV 87        Recent Labs   Lab Test 05/08/23 0921  07/07/22  0744 06/29/22  1210   HGB 15.2 14.1 14.8    No results for input(s): TROPONINI in the last 85239 hours.  No results for input(s): BNP, NTBNPI, NTBNP in the last 18744 hours.  No results for input(s): TSH in the last 44837 hours.  Recent Labs   Lab Test 12/10/21  1124   INR 1.06        Marline Ohara MD                                        Thank you for allowing me to participate in the care of your patient.      Sincerely,     Marline Ohara MD     [facility]  cc:   No referring provider defined for this encounter.

## 2023-08-09 DIAGNOSIS — I10 BENIGN ESSENTIAL HYPERTENSION: ICD-10-CM

## 2023-08-09 RX ORDER — LISINOPRIL 10 MG/1
TABLET ORAL
Qty: 90 TABLET | Refills: 2 | Status: SHIPPED | OUTPATIENT
Start: 2023-08-09 | End: 2024-04-16

## 2023-08-09 NOTE — TELEPHONE ENCOUNTER
"Last Written Prescription Date:  8-26-22  Last Fill Quantity: 90,  # refills: 3   Last office visit provider:  5-30-23     Requested Prescriptions   Pending Prescriptions Disp Refills    lisinopril (ZESTRIL) 10 MG tablet [Pharmacy Med Name: LISINOPRIL 10MG TABS] 90 tablet 2     Sig: TAKE 1 TABLET (10 MG TOTAL) BY MOUTH DAILY.       ACE Inhibitors (Including Combos) Protocol Passed - 8/9/2023  5:02 AM        Passed - Blood pressure under 140/90 in past 12 months     BP Readings from Last 3 Encounters:   07/26/23 128/64   05/30/23 130/68   05/08/23 118/60                 Passed - Recent (12 mo) or future (30 days) visit within the authorizing provider's specialty     Patient has had an office visit with the authorizing provider or a provider within the authorizing providers department within the previous 12 mos or has a future within next 30 days. See \"Patient Info\" tab in inbasket, or \"Choose Columns\" in Meds & Orders section of the refill encounter.              Passed - Medication is active on med list        Passed - Patient is age 18 or older        Passed - Normal serum creatinine on file in past 12 months     Recent Labs   Lab Test 05/08/23  0921   CR 1.06       Ok to refill medication if creatinine is low          Passed - Normal serum potassium on file in past 12 months     Recent Labs   Lab Test 05/08/23  0921   POTASSIUM 4.2                Violette Menezes RN  [facility]    Violette Menezes RN 08/09/23 11:02 AM  "

## 2023-08-17 ENCOUNTER — PRE VISIT (OUTPATIENT)
Dept: SURGERY | Facility: CLINIC | Age: 73
End: 2023-08-17

## 2023-10-20 ENCOUNTER — IMMUNIZATION (OUTPATIENT)
Dept: FAMILY MEDICINE | Facility: CLINIC | Age: 73
End: 2023-10-20
Payer: COMMERCIAL

## 2023-10-20 PROCEDURE — 90480 ADMN SARSCOV2 VAC 1/ONLY CMP: CPT

## 2023-10-20 PROCEDURE — 91320 SARSCV2 VAC 30MCG TRS-SUC IM: CPT

## 2023-10-20 PROCEDURE — 90662 IIV NO PRSV INCREASED AG IM: CPT

## 2023-10-20 PROCEDURE — G0008 ADMIN INFLUENZA VIRUS VAC: HCPCS | Mod: 59

## 2023-12-06 DIAGNOSIS — E78.00 HYPERCHOLESTEROLEMIA: ICD-10-CM

## 2023-12-06 DIAGNOSIS — E78.5 HYPERLIPEMIA: ICD-10-CM

## 2023-12-06 RX ORDER — ATORVASTATIN CALCIUM 40 MG/1
TABLET, FILM COATED ORAL
Qty: 90 TABLET | Refills: 1 | Status: SHIPPED | OUTPATIENT
Start: 2023-12-06 | End: 2024-05-20

## 2024-01-19 ENCOUNTER — TRANSFERRED RECORDS (OUTPATIENT)
Dept: HEALTH INFORMATION MANAGEMENT | Facility: CLINIC | Age: 74
End: 2024-01-19
Payer: COMMERCIAL

## 2024-03-06 ENCOUNTER — PATIENT OUTREACH (OUTPATIENT)
Dept: CARE COORDINATION | Facility: CLINIC | Age: 74
End: 2024-03-06
Payer: COMMERCIAL

## 2024-03-20 ENCOUNTER — PATIENT OUTREACH (OUTPATIENT)
Dept: CARE COORDINATION | Facility: CLINIC | Age: 74
End: 2024-03-20
Payer: COMMERCIAL

## 2024-04-14 DIAGNOSIS — I10 BENIGN ESSENTIAL HYPERTENSION: ICD-10-CM

## 2024-04-16 RX ORDER — LISINOPRIL 10 MG/1
10 TABLET ORAL DAILY
Qty: 90 TABLET | Refills: 0 | Status: SHIPPED | OUTPATIENT
Start: 2024-04-16 | End: 2024-07-15

## 2024-04-17 ENCOUNTER — TRANSFERRED RECORDS (OUTPATIENT)
Dept: HEALTH INFORMATION MANAGEMENT | Facility: CLINIC | Age: 74
End: 2024-04-17
Payer: COMMERCIAL

## 2024-04-18 ENCOUNTER — TRANSCRIBE ORDERS (OUTPATIENT)
Dept: OTHER | Age: 74
End: 2024-04-18

## 2024-04-18 DIAGNOSIS — M54.50 LOW BACK PAIN: Primary | ICD-10-CM

## 2024-05-13 DIAGNOSIS — I25.5 ISCHEMIC CARDIOMYOPATHY: ICD-10-CM

## 2024-05-13 RX ORDER — CARVEDILOL 12.5 MG/1
12.5 TABLET ORAL 2 TIMES DAILY WITH MEALS
Qty: 180 TABLET | Refills: 0 | Status: SHIPPED | OUTPATIENT
Start: 2024-05-13 | End: 2024-08-28

## 2024-05-19 DIAGNOSIS — E78.00 HYPERCHOLESTEROLEMIA: ICD-10-CM

## 2024-05-19 DIAGNOSIS — E78.5 HYPERLIPEMIA: ICD-10-CM

## 2024-05-20 RX ORDER — ATORVASTATIN CALCIUM 40 MG/1
TABLET, FILM COATED ORAL
Qty: 90 TABLET | Refills: 0 | Status: SHIPPED | OUTPATIENT
Start: 2024-05-20 | End: 2024-08-19

## 2024-06-03 SDOH — HEALTH STABILITY: PHYSICAL HEALTH: ON AVERAGE, HOW MANY MINUTES DO YOU ENGAGE IN EXERCISE AT THIS LEVEL?: 20 MIN

## 2024-06-03 SDOH — HEALTH STABILITY: PHYSICAL HEALTH: ON AVERAGE, HOW MANY DAYS PER WEEK DO YOU ENGAGE IN MODERATE TO STRENUOUS EXERCISE (LIKE A BRISK WALK)?: 5 DAYS

## 2024-06-03 ASSESSMENT — SOCIAL DETERMINANTS OF HEALTH (SDOH): HOW OFTEN DO YOU GET TOGETHER WITH FRIENDS OR RELATIVES?: TWICE A WEEK

## 2024-06-04 ENCOUNTER — OFFICE VISIT (OUTPATIENT)
Dept: FAMILY MEDICINE | Facility: CLINIC | Age: 74
End: 2024-06-04
Payer: COMMERCIAL

## 2024-06-04 VITALS
BODY MASS INDEX: 28.04 KG/M2 | HEIGHT: 73 IN | DIASTOLIC BLOOD PRESSURE: 73 MMHG | SYSTOLIC BLOOD PRESSURE: 133 MMHG | RESPIRATION RATE: 16 BRPM | TEMPERATURE: 97.9 F | HEART RATE: 57 BPM | OXYGEN SATURATION: 98 % | WEIGHT: 211.6 LBS

## 2024-06-04 DIAGNOSIS — I50.22 CHRONIC SYSTOLIC CONGESTIVE HEART FAILURE (H): ICD-10-CM

## 2024-06-04 DIAGNOSIS — I25.10 ATHEROSCLEROSIS OF NATIVE CORONARY ARTERY OF NATIVE HEART WITHOUT ANGINA PECTORIS: ICD-10-CM

## 2024-06-04 DIAGNOSIS — L98.9 SKIN LESION: ICD-10-CM

## 2024-06-04 DIAGNOSIS — N40.1 BENIGN PROSTATIC HYPERPLASIA WITH URINARY FREQUENCY: ICD-10-CM

## 2024-06-04 DIAGNOSIS — R35.0 BENIGN PROSTATIC HYPERPLASIA WITH URINARY FREQUENCY: ICD-10-CM

## 2024-06-04 DIAGNOSIS — I25.5 ISCHEMIC CONGESTIVE CARDIOMYOPATHY (H): ICD-10-CM

## 2024-06-04 DIAGNOSIS — E78.00 HYPERCHOLESTEROLEMIA: ICD-10-CM

## 2024-06-04 DIAGNOSIS — I42.0 ISCHEMIC CONGESTIVE CARDIOMYOPATHY (H): ICD-10-CM

## 2024-06-04 DIAGNOSIS — Z12.5 SCREENING FOR PROSTATE CANCER: ICD-10-CM

## 2024-06-04 DIAGNOSIS — D12.5 ADENOMATOUS POLYP OF SIGMOID COLON: ICD-10-CM

## 2024-06-04 DIAGNOSIS — Z00.00 ENCOUNTER FOR MEDICARE ANNUAL WELLNESS EXAM: Primary | ICD-10-CM

## 2024-06-04 DIAGNOSIS — Z85.828 HISTORY OF SKIN CANCER: ICD-10-CM

## 2024-06-04 DIAGNOSIS — I25.5 ISCHEMIC CARDIOMYOPATHY: ICD-10-CM

## 2024-06-04 LAB
ERYTHROCYTE [DISTWIDTH] IN BLOOD BY AUTOMATED COUNT: 12.7 % (ref 10–15)
HCT VFR BLD AUTO: 46.5 % (ref 40–53)
HGB BLD-MCNC: 15.7 G/DL (ref 13.3–17.7)
MCH RBC QN AUTO: 29.3 PG (ref 26.5–33)
MCHC RBC AUTO-ENTMCNC: 33.8 G/DL (ref 31.5–36.5)
MCV RBC AUTO: 87 FL (ref 78–100)
PLATELET # BLD AUTO: 205 10E3/UL (ref 150–450)
RBC # BLD AUTO: 5.35 10E6/UL (ref 4.4–5.9)
WBC # BLD AUTO: 5.7 10E3/UL (ref 4–11)

## 2024-06-04 PROCEDURE — 99214 OFFICE O/P EST MOD 30 MIN: CPT | Mod: 25 | Performed by: FAMILY MEDICINE

## 2024-06-04 PROCEDURE — 80061 LIPID PANEL: CPT | Performed by: FAMILY MEDICINE

## 2024-06-04 PROCEDURE — G0439 PPPS, SUBSEQ VISIT: HCPCS | Performed by: FAMILY MEDICINE

## 2024-06-04 PROCEDURE — 80053 COMPREHEN METABOLIC PANEL: CPT | Performed by: FAMILY MEDICINE

## 2024-06-04 PROCEDURE — 17000 DESTRUCT PREMALG LESION: CPT | Performed by: FAMILY MEDICINE

## 2024-06-04 PROCEDURE — 36415 COLL VENOUS BLD VENIPUNCTURE: CPT | Performed by: FAMILY MEDICINE

## 2024-06-04 PROCEDURE — 82248 BILIRUBIN DIRECT: CPT | Performed by: FAMILY MEDICINE

## 2024-06-04 PROCEDURE — G0103 PSA SCREENING: HCPCS | Performed by: FAMILY MEDICINE

## 2024-06-04 PROCEDURE — 84443 ASSAY THYROID STIM HORMONE: CPT | Performed by: FAMILY MEDICINE

## 2024-06-04 PROCEDURE — 85027 COMPLETE CBC AUTOMATED: CPT | Performed by: FAMILY MEDICINE

## 2024-06-04 RX ORDER — RESPIRATORY SYNCYTIAL VIRUS VACCINE 120MCG/0.5
0.5 KIT INTRAMUSCULAR ONCE
Qty: 1 EACH | Refills: 0 | Status: CANCELLED | OUTPATIENT
Start: 2024-06-04 | End: 2024-06-04

## 2024-06-04 NOTE — PATIENT INSTRUCTIONS
"Preventive Care Advice   This is general advice we often give to help people stay healthy. Your care team may have specific advice just for you. Please talk to your care team about your own preventive care needs.  Chao,  We will check your lab testing as discussed  You may set up a colonoscopy  Your cheek and ear lesions were frozen with liquid nitrogen. You can follow-up with dermatology.  Follow-up with Dr. Ohara to check your heart  You can consider the RSV vaccine as well as a COVID booster  For constipation you can consider Miralax as needed  Brad Roldan MD      Lifestyle  Exercise at least 150 minutes each week (30 minutes a day, 5 days a week).  Do muscle strengthening activities 2 days a week. These help control your weight and prevent disease.  No smoking.  Wear sunscreen to prevent skin cancer.  Have your home tested for radon every 2 to 5 years. Radon is a colorless, odorless gas that can harm your lungs. To learn more, go to www.health.[facility].mn. and search for \"Radon in Homes.\"  Keep guns unloaded and locked up in a safe place like a safe or gun vault, or, use a gun lock and hide the keys. Always lock away bullets separately. To learn more, visit [employer].[place].gov and search for \"safe gun storage.\"  Nutrition  Eat 5 or more servings of fruits and vegetables each day.  Try wheat bread, brown rice and whole grain pasta (instead of white bread, rice, and pasta).  Get enough calcium and vitamin D. Check the label on foods and aim for 100% of the RDA (recommended daily allowance).  Regular exams  Have a dental exam and cleaning every 6 months.  See your health care team every year to talk about:  Any changes in your health.  Any medicines your care team has prescribed.  Preventive care, family planning, and ways to prevent chronic diseases.  Shots (vaccines)   HPV shots (up to age 26), if you've never had them before.  Hepatitis B shots (up to age 59), if you've never had them before.  COVID-19 shot: Get " this shot when it's due.  Flu shot: Get a flu shot every year.  Tetanus shot: Get a tetanus shot every 10 years.  Pneumococcal, hepatitis A, and RSV shots: Ask your care team if you need these based on your risk.  Shingles shot (for age 50 and up).  General health tests  Diabetes screening:  Starting at age 35, Get screened for diabetes at least every 3 years.  If you are younger than age 35, ask your care team if you should be screened for diabetes.  Cholesterol test: At age 39, start having a cholesterol test every 5 years, or more often if advised.  Bone density scan (DEXA): At age 50, ask your care team if you should have this scan for osteoporosis (brittle bones).  Hepatitis C: Get tested at least once in your life.  Abdominal aortic aneurysm screening: Talk to your doctor about having this screening if you:  Have ever smoked; and  Are biologically male; and  Are between the ages of 65 and 75.  STIs (sexually transmitted infections)  Before age 24: Ask your care team if you should be screened for STIs.  After age 24: Get screened for STIs if you're at risk. You are at risk for STIs (including HIV) if:  You are sexually active with more than one person.  You don't use condoms every time.  You or a partner was diagnosed with a sexually transmitted infection.  If you are at risk for HIV, ask about PrEP medicine to prevent HIV.  Get tested for HIV at least once in your life, whether you are at risk for HIV or not.  Cancer screening tests  Cervical cancer screening: If you have a cervix, begin getting regular cervical cancer screening tests at age 21. Most people who have regular screenings with normal results can stop after age 65. Talk about this with your provider.  Breast cancer scan (mammogram): If you've ever had breasts, begin having regular mammograms starting at age 40. This is a scan to check for breast cancer.  Colon cancer screening: It is important to start screening for colon cancer at age 45.  Have a  colonoscopy test every 10 years (or more often if you're at risk) Or, ask your provider about stool tests like a FIT test every year or Cologuard test every 3 years.  To learn more about your testing options, visit: www.Inf[employer]/082288.pdf.  For help making a decision, visit: kellie/wj46610.  Prostate cancer screening test: If you have a prostate and are age 55 to 69, ask your provider if you would benefit from a yearly prostate cancer screening test.  Lung cancer screening: If you are a current or former smoker age 50 to 80, ask your care team if ongoing lung cancer screenings are right for you.  For informational purposes only. Not to replace the advice of your health care provider. Copyright   2023 [facility]. All rights reserved. Clinically reviewed by the [facility]. Propers [id number] - REV 04/24.

## 2024-06-04 NOTE — PROGRESS NOTES
Preventive Care Visit  [facility]  Brad Roldan MD, Family Medicine  Jun 4, 2024      Assessment & Plan     Encounter for Medicare annual wellness exam    Recommend remaining physically active  He will consider the RSV vaccine    Chronic systolic congestive heart failure (H)  Ischemic congestive cardiomyopathy (H)  Atherosclerosis of native coronary artery of native heart without angina pectoris    Continue plan per Dr. Ohara  Continue atorvastatin, carvedilol, and lisinopril as well as Eliquis    Check laboratory testing as noted  - Basic metabolic panel; Future  - CBC with platelets; Future  - TSH with free T4 reflex; Future    Adenomatous polyp of sigmoid colon    His most recent colonoscopy was in October 2019.  He is due in October of this year  - Colonoscopy Screening  Referral; Future    Hypercholesterolemia    Continue atorvastatin  Check laboratory testing    - Hepatic function panel; Future  - Lipid panel reflex to direct LDL Fasting; Future    Benign prostatic hyperplasia with urinary frequency  Status post TURP    He will follow-up with urology as needed    Screening for prostate cancer    Check a PSA    - PSA, screen; Future    History of skin cancer    Continue to follow-up with dermatology    Skin lesions    AK, left cheek  Probable AK posterior to the right ear    Per his request the 2 skin lesions were frozen with liquid nitrogen using the freeze-thaw technique without complication  Recommend follow-up with dermatology if these persist     Constipation    He has a current regimen of Benefiber, stool softeners,and yogurt  You may consider MiraLAX as needed     Status post right total knee arthroplasty    He does have ongoing stiffness    Low back pain, mechanical    He was treated with physical therapy  Follow-up as needed    Atypical chest pain    Recommend follow-up with Dr. Ohara as planned  Reviewed warning signs and recommend immediate follow-up in  "the ED as planned    Patient has been advised of split billing requirements and indicates understanding: Yes        BMI  Estimated body mass index is 27.92 kg/m  as calculated from the following:    Height as of this encounter: 1.854 m (6' 1\").    Weight as of this encounter: 96 kg (211 lb 9.6 oz).   Weight management plan: Discussed healthy diet and exercise guidelines    Counseling  Appropriate preventive services were discussed with this patient, including applicable screening as appropriate for fall prevention, nutrition, physical activity, Tobacco-use cessation, weight loss and cognition.  Checklist reviewing preventive services available has been given to the patient.  Reviewed patient's diet, addressing concerns and/or questions.           Price Terry is a 73 year old, presenting for the following:  Wellness Visit        6/4/2024    12:43 PM   Additional Questions   Roomed by Stormy VITALE CMA         Health Care Directive  Patient does not have a Health Care Directive or Living Will: Discussed advance care planning with patient; information given to patient to review.    REBECA Terry presents to the clinic for a wellness visit.  He presents for medication check as well. He requests freezing skin lesions with liquid nitrogen as well.     Medical history is notable for coronary artery disease, ischemic cardiomyopathy, benign prostatic hypertrophy with obstructive symptoms, and an elevated PSA as well as hyperlipidemia.      He previously had a cystoscopy for evaluation hematuria and therefore was a candidate for resection of the prostate. Following the original cystoscopy he developed a urinary tract infection which was treated. He has had his procedure. Following the prostate resection he had some blood in his urine but that has resolved. He denies burning with urination, fever, or chills.  His urine flow has been fine.    Recent history is notable for low back pain.  He followed up with orthopedics and was " treated conservatively.    He has intermittent constipation and is treated with Benefiber, stool softeners, an yogurt.    He has a skin lesion on his right cheek and behind his right ear that he would like frozen with liquid nitrogen.  He typically follows up with dermatology and did have the cheek lesion frozen previously.    Recent history is notable for occasional twinges of chest discomfort. He has noticed this when lifting weights. He uses an elliptical machine and bikes as well.      Regarding his cardiovascular history he had a previous myocardial infarction in 2004.  He had a single-vessel coronary artery disease with coronary stent placement in the proximal LAD at the time.  He has followed up with cardiology on a consistent basis for an ischemic cardiomyopathy.    A previous echocardiogram revealed an ejection fraction of 32% with mild mitral regurgitation and mild tricuspid regurgitation.    A follow-up nuclear cardiac stress test was negative for inducible myocardial ischemia in 2020.  There was evidence of a previous transmural myocardial infarction in the mid to apical segments and anteroseptal segment.  There was akinesis in the mid to apical anterior wall and distal anteroseptal segment.       He continues to follow-up with Dr. Ohara, cardiology. In the meantime, he did have an abnormal cardiac MRI showing a moderate reduction in the left ventricular ejection fraction.  The ejection fraction was estimated to be 41%.  There was an area of nontransmural myocardial infarction.  Also, there was a small area of mural thrombus involving the distal anteroseptal and apical septal wall segments.  He has been treated with Eliquis.     His mood has been stable.         6/3/2024   General Health   How would you rate your overall physical health? Good   Feel stress (tense, anxious, or unable to sleep) Not at all         6/3/2024   Nutrition   Diet: Regular (no restrictions)         6/3/2024   Exercise   Days per  week of moderate/strenous exercise 5 days   Average minutes spent exercising at this level 20 min         6/3/2024   Social Factors   Frequency of gathering with friends or relatives Twice a week   Worry food won't last until get money to buy more No   Food not last or not have enough money for food? No   Do you have housing?  Yes   Are you worried about losing your housing? No   Lack of transportation? No   Unable to get utilities (heat,electricity)? No         6/3/2024   Fall Risk   Fallen 2 or more times in the past year? No    No   Trouble with walking or balance? No    No          6/3/2024   Activities of Daily Living- Home Safety   Needs help with the following daily activites None of the above   Safety concerns in the home None of the above         6/3/2024   Dental   Dentist two times every year? Yes         6/3/2024   Hearing Screening   Hearing concerns? None of the above         6/3/2024   Driving Risk Screening   Patient/family members have concerns about driving No         6/3/2024   General Alertness/Fatigue Screening   Have you been more tired than usual lately? No         6/3/2024   Urinary Incontinence Screening   Bothered by leaking urine in past 6 months No         6/3/2024   TB Screening   Were you born outside of the [place]? No         Today's PHQ-2 Score:       6/3/2024     9:16 AM   PHQ-2 ( 1999 Pf[employer])   Q1: Little interest or pleasure in doing things 0   Q2: Feeling down, depressed or hopeless 0   PHQ-2 Score 0   Q1: Little interest or pleasure in doing things Not at all   Q2: Feeling down, depressed or hopeless Not at all   PHQ-2 Score 0           6/3/2024   Substance Use   Alcohol more than 3/day or more than 7/wk No   Do you have a current opioid prescription? No   How severe/bad is pain from 1 to 10? 0/10 (No Pain)   Do you use any other substances recreationally? No     Social History     Tobacco Use    Smoking status: Never    Smokeless tobacco: Never   Vaping Use    Vaping status: Never  Used   Substance Use Topics    Alcohol use: Not Currently     Comment: rare    Drug use: No           6/3/2024   AAA Screening   Family history of Abdominal Aortic Aneurysm (AAA)? (!) YES    ASCVD Risk   The ASCVD Risk score (Karan ABERNATHY, et al., 2019) failed to calculate for the following reasons:    The valid total cholesterol range is 130 to 320 mg/dL            Reviewed and updated as needed this visit by Provider                    Past Medical History:   Diagnosis Date    Antiplatelet or antithrombotic long-term use     Arthritis     BPH (benign prostatic hyperplasia)     Coronary artery disease     Diverticulosis     Hyperlipidemia     Hypertension     Ischemic cardiomyopathy     Myocardial infarction (H) 2004    Prostate hyperplasia, benign localized, without urinary obstruction     Squamous cell carcinoma of skin     Stented coronary artery      Past Surgical History:   Procedure Laterality Date    ARTHROPLASTY KNEE Right 12/13/2021    Procedure: RIGHT TOTAL KNEE ARTHROPLASTY;  Surgeon: Magdi Estrada MD;  Location: [facility]    BIOPSY  2016    CORONARY STENT PLACEMENT      CYSTOSCOPY PROSTATE W/ LASER N/A 11/29/2016    Procedure: CYSTOSCOPY WITH TRANSURETHRAL RESECTION OF THE PROSTATE WITH QUANTA LASER;  Surgeon: Tre Cuadra MD;  [place] [facility] [place]  Service:     EXCHANGE POLY COMPONENT ARTHROPLASTY KNEE Right 07/06/2022    Procedure: RIGHT TOTAL KNEE SCAR EXCISION;  Surgeon: Magdi Estrada MD;  Location: [facility]    GENITOURINARY SURGERY  2017     Current providers sharing in care for this patient include:  Patient Care Team:  Brad Roldan MD as PCP - General (Family Medicine)  Brad Roldan MD as Assigned PCP  Marline Ohara MD as Assigned Heart and Vascular Provider  Gayatri Prajapati APRN CNP as Nurse Practitioner (Colon & Rectal)    The following health maintenance items are reviewed in Epic and correct as of  "today:  Health Maintenance   Topic Date Due    ANNUAL REVIEW OF HM ORDERS  Never done    RSV VACCINE (Pregnancy & 60+) (1 - 1-dose 60+ series) Never done    COVID-19 Vaccine (6 - 2023-24 season) 02/20/2024    MEDICARE ANNUAL WELLNESS VISIT  04/05/2024    LIPID  05/08/2024    COLORECTAL CANCER SCREENING  10/25/2024    FALL RISK ASSESSMENT  06/04/2025    GLUCOSE  05/08/2026    ADVANCE CARE PLANNING  05/08/2028    DTAP/TDAP/TD IMMUNIZATION (3 - Td or Tdap) 05/08/2033    HEPATITIS C SCREENING  Completed    PHQ-2 (once per calendar year)  Completed    INFLUENZA VACCINE  Completed    Pneumococcal Vaccine: 65+ Years  Completed    ZOSTER IMMUNIZATION  Completed    IPV IMMUNIZATION  Aged Out    HPV IMMUNIZATION  Aged Out    MENINGITIS IMMUNIZATION  Aged Out    RSV MONOCLONAL ANTIBODY  Aged Out         Review of Systems  Constitutional, HEENT, cardiovascular, pulmonary, gi and gu systems are negative, except as otherwise noted.     Objective    Exam  There were no vitals taken for this visit.   Estimated body mass index is 27.31 kg/m  as calculated from the following:    Height as of 7/26/23: 1.854 m (6' 1\").    Weight as of 7/26/23: 93.9 kg (207 lb).    Physical Exam  GENERAL: alert and no distress  EYES: Eyes grossly normal to inspection, PERRL and conjunctivae and sclerae normal  He wears glasses  HENT: ear canals and TM's normal, nose and mouth without ulcers or lesions  NECK: no adenopathy, no asymmetry, masses, or scars  RESP: lungs clear to auscultation - no rales, rhonchi or wheezes  CV: regular rate and rhythm, normal S1 S2, no S3 or S4, no murmur, click or rub, no peripheral edema  SKIN: There is a scaly, flaky lesion of the right cheek.  There is a verrucous type lesion involving the posterior aspect of the right ear as well  NEURO: Normal strength and tone, mentation intact and speech normal  PSYCH: mentation appears normal, affect normal/bright         No data to display                       Signed " Electronically by: Brad Roldan MD

## 2024-06-05 LAB
ALBUMIN SERPL BCG-MCNC: 4.1 G/DL (ref 3.5–5.2)
ALP SERPL-CCNC: 67 U/L (ref 40–150)
ALT SERPL W P-5'-P-CCNC: 25 U/L (ref 0–70)
ANION GAP SERPL CALCULATED.3IONS-SCNC: 11 MMOL/L (ref 7–15)
AST SERPL W P-5'-P-CCNC: 20 U/L (ref 0–45)
BILIRUB DIRECT SERPL-MCNC: 0.23 MG/DL (ref 0–0.3)
BILIRUB SERPL-MCNC: 0.8 MG/DL
BUN SERPL-MCNC: 16.9 MG/DL (ref 8–23)
CALCIUM SERPL-MCNC: 9.2 MG/DL (ref 8.8–10.2)
CHLORIDE SERPL-SCNC: 107 MMOL/L (ref 98–107)
CHOLEST SERPL-MCNC: 97 MG/DL
CREAT SERPL-MCNC: 0.97 MG/DL (ref 0.67–1.17)
DEPRECATED HCO3 PLAS-SCNC: 24 MMOL/L (ref 22–29)
EGFRCR SERPLBLD CKD-EPI 2021: 83 ML/MIN/1.73M2
FASTING STATUS PATIENT QL REPORTED: ABNORMAL
FASTING STATUS PATIENT QL REPORTED: ABNORMAL
GLUCOSE SERPL-MCNC: 106 MG/DL (ref 70–99)
HDLC SERPL-MCNC: 31 MG/DL
LDLC SERPL CALC-MCNC: 51 MG/DL
NONHDLC SERPL-MCNC: 66 MG/DL
POTASSIUM SERPL-SCNC: 4.4 MMOL/L (ref 3.4–5.3)
PROT SERPL-MCNC: 6.6 G/DL (ref 6.4–8.3)
PSA SERPL DL<=0.01 NG/ML-MCNC: 0.53 NG/ML (ref 0–6.5)
SODIUM SERPL-SCNC: 142 MMOL/L (ref 135–145)
TRIGL SERPL-MCNC: 76 MG/DL
TSH SERPL DL<=0.005 MIU/L-ACNC: 1.97 UIU/ML (ref 0.3–4.2)

## 2024-06-16 ENCOUNTER — HEALTH MAINTENANCE LETTER (OUTPATIENT)
Age: 74
End: 2024-06-16

## 2024-07-10 ENCOUNTER — TRANSFERRED RECORDS (OUTPATIENT)
Dept: HEALTH INFORMATION MANAGEMENT | Facility: CLINIC | Age: 74
End: 2024-07-10
Payer: COMMERCIAL

## 2024-07-13 DIAGNOSIS — I10 BENIGN ESSENTIAL HYPERTENSION: ICD-10-CM

## 2024-07-15 RX ORDER — LISINOPRIL 10 MG/1
10 TABLET ORAL DAILY
Qty: 90 TABLET | Refills: 2 | Status: SHIPPED | OUTPATIENT
Start: 2024-07-15

## 2024-08-16 ENCOUNTER — NURSE TRIAGE (OUTPATIENT)
Dept: FAMILY MEDICINE | Facility: CLINIC | Age: 74
End: 2024-08-16

## 2024-08-16 ENCOUNTER — MYC MEDICAL ADVICE (OUTPATIENT)
Dept: FAMILY MEDICINE | Facility: CLINIC | Age: 74
End: 2024-08-16

## 2024-08-16 NOTE — TELEPHONE ENCOUNTER
Nurse Triage SBAR    Is this a 2nd Level Triage? No    Situation: Covid positive 8/16/24    Background: Tested positive for Covid today, symptoms began 8/14. Dry cough, generalized aches, fatigue. Denies SOB, chest pain. Had fevers up to 103.0 yesterday, though reports that these have since resolved.    Assessment: Patient needs VV with provider to discuss Paxlovid prescription d/t use of Eliquis    Protocol Recommended Disposition:   Discuss With PCP And Callback By Nurse Within 1 Hour    Recommendation:  Patient scheduled for VV with provider to further discuss Paxlovid prescription.     Kiley Painter RN    Reason for Disposition   HIGH RISK patient (e.g., weak immune system, age > 64 years, obesity with BMI of 30 or higher, pregnant, chronic lung disease or other chronic medical condition) and COVID symptoms (e.g., cough, fever)  (Exceptions: Already seen by doctor or NP/PA and no new or worsening symptoms.)    Additional Information   Negative: SEVERE difficulty breathing (e.g., struggling for each breath, speaks in single words)   Negative: Difficult to awaken or acting confused (e.g., disoriented, slurred speech)   Negative: Bluish (or gray) lips or face now   Negative: Shock suspected (e.g., cold/pale/clammy skin, too weak to stand, low BP, rapid pulse)   Negative: Sounds like a life-threatening emergency to the triager   Negative: Diagnosed or suspected COVID-19 and symptoms lasting 3 or more weeks   Negative: COVID-19 exposure and no symptoms   Negative: COVID-19 vaccine reaction suspected (e.g., fever, headache, muscle aches) occurring 1 to 3 days after getting vaccine   Negative: COVID-19 vaccine, questions about   Negative: Lives with someone known to have influenza (flu test positive) and flu-like symptoms (e.g., cough, runny nose, sore throat, SOB; with or without fever)   Negative: Possible COVID-19 symptoms and triager concerned about severity of symptoms or other causes   Negative: COVID-19  and breastfeeding, questions about   Negative: SEVERE or constant chest pain or pressure  (Exception: Mild central chest pain, present only when coughing.)   Negative: MODERATE difficulty breathing (e.g., speaks in phrases, SOB even at rest, pulse 100-120)   Negative: Headache and stiff neck (can't touch chin to chest)   Negative: Oxygen level (e.g., pulse oximetry) 90% or lower   Negative: Chest pain or pressure  (Exception: MILD central chest pain, present only when coughing.)   Negative: Drinking very little and dehydration suspected (e.g., no urine > 12 hours, very dry mouth, very lightheaded)   Negative: Patient sounds very sick or weak to the triager   Negative: MILD difficulty breathing (e.g., minimal/no SOB at rest, SOB with walking, pulse <100)   Negative: Fever > 103 F (39.4 C)   Negative: Fever > 101 F (38.3 C) and over 60 years of age   Negative: Fever > 100.0 F (37.8 C) and bedridden (e.g., CVA, chronic illness, recovering from surgery)    Protocols used: Coronavirus (COVID-19) Diagnosed or [id number]

## 2024-08-16 NOTE — TELEPHONE ENCOUNTER
FYI - Status Update    Who is Calling: patient    Update: Pt calling clinic because his VV appointment today was cancelled. Advised pt that based on protocol, he should be seen by a clinician since he is on Eliquis. No appointments available today. Offered to schedule VV appt on Monday 8/19/24, pt declined. Pt reported his temp is now at 97. Pt stated he will see how he feels and may not need to start paxlovid. Advised pt to go to UC if his symptoms worsen. Pt verbalized understanding and in agreement with plan.      Routed to PCP for awareness and update    David EDWARDS RN  [facility]

## 2024-08-18 DIAGNOSIS — E78.00 HYPERCHOLESTEROLEMIA: ICD-10-CM

## 2024-08-18 DIAGNOSIS — E78.5 HYPERLIPEMIA: ICD-10-CM

## 2024-08-19 RX ORDER — ATORVASTATIN CALCIUM 40 MG/1
TABLET, FILM COATED ORAL
Qty: 90 TABLET | Refills: 0 | Status: SHIPPED | OUTPATIENT
Start: 2024-08-19

## 2024-08-23 ENCOUNTER — OFFICE VISIT (OUTPATIENT)
Dept: CARDIOLOGY | Facility: CLINIC | Age: 74
End: 2024-08-23
Payer: COMMERCIAL

## 2024-08-23 VITALS
DIASTOLIC BLOOD PRESSURE: 66 MMHG | HEART RATE: 57 BPM | RESPIRATION RATE: 14 BRPM | WEIGHT: 205 LBS | BODY MASS INDEX: 27.05 KG/M2 | SYSTOLIC BLOOD PRESSURE: 125 MMHG

## 2024-08-23 DIAGNOSIS — I25.119 CORONARY ARTERY DISEASE INVOLVING NATIVE CORONARY ARTERY OF NATIVE HEART WITH ANGINA PECTORIS (H): Primary | ICD-10-CM

## 2024-08-23 PROCEDURE — G2211 COMPLEX E/M VISIT ADD ON: HCPCS | Performed by: INTERNAL MEDICINE

## 2024-08-23 PROCEDURE — 99214 OFFICE O/P EST MOD 30 MIN: CPT | Performed by: INTERNAL MEDICINE

## 2024-08-23 NOTE — PROGRESS NOTES
HEART CARE ENCOUNTER CONSULTATON NOTE      [facility]  [phone]      Assessment/Recommendations   Assessment/Plan:         History of Present Illness/Subjective    HPI: Harrison Barrientos is a 74 year old male ***    Recent Echocardiogram Results:      Recent Coronary Angiogram Results:         Physical Examination  Review of Systems   Vitals: /66 (BP Location: Right arm, Patient Position: Sitting, Cuff Size: Adult Large)   Pulse 57   Resp 14   Wt 93 kg (205 lb)   BMI 27.05 kg/m    BMI= Body mass index is 27.05 kg/m .  Wt Readings from Last 3 Encounters:   08/23/24 93 kg (205 lb)   06/04/24 96 kg (211 lb 9.6 oz)   07/26/23 93.9 kg (207 lb)       General Appearance:   no distress, normal body habitus   ENT/Mouth: membranes moist, no oral lesions or bleeding gums.      EYES:  no scleral icterus, normal conjunctivae   Neck: no carotid bruits or thyromegaly   Chest/Lungs:   lungs are clear to auscultation, no rales or wheezing, *** sternal scar, equal chest wall expansion    Cardiovascular:   ***Regular. Normal first and second heart sounds with ***no murmurs, rubs, or gallops; the carotid, radial and posterior tibial pulses are intact, Jugular venous pressure ***, *** edema bilaterally    Abdomen:  no organomegaly, masses, bruits, or tenderness; bowel sounds are present   Extremities: no cyanosis or clubbing   Skin: no xanthelasma, warm.    Neurologic: normal  bilateral, no tremors     Psychiatric: alert and oriented x3, calm        Please refer above for cardiac ROS details.        Medical History  Surgical History Family History Social History   Past Medical History:   Diagnosis Date    Antiplatelet or antithrombotic long-term use     Arthritis     BPH (benign prostatic hyperplasia)     Coronary artery disease     Diverticulosis     Hyperlipidemia     Hypertension     Ischemic cardiomyopathy     Myocardial infarction (H) 2004    Prostate hyperplasia, benign localized, without  urinary obstruction     Squamous cell carcinoma of skin     Stented coronary artery      Past Surgical History:   Procedure Laterality Date    ARTHROPLASTY KNEE Right 12/13/2021    Procedure: RIGHT TOTAL KNEE ARTHROPLASTY;  Surgeon: Magdi Estrada MD;  Location: [facility]    BIOPSY  2016    CORONARY STENT PLACEMENT      CYSTOSCOPY PROSTATE W/ LASER N/A 11/29/2016    Procedure: CYSTOSCOPY WITH TRANSURETHRAL RESECTION OF THE PROSTATE WITH QUANTA LASER;  Surgeon: Tre Cuadra MD;  Location: [facility];  Service:     EXCHANGE POLY COMPONENT ARTHROPLASTY KNEE Right 07/06/2022    Procedure: RIGHT TOTAL KNEE SCAR EXCISION;  Surgeon: Magdi Estrada MD;  Location: [facility]    GENITOURINARY SURGERY  2017     Family History   Problem Relation Age of Onset    Heart Disease Mother         coronary stents    Coronary Artery Disease Mother     Hypertension Mother     Benign prostatic hyperplasia Father     Heart Disease Father     Kidney Cancer Father     Coronary Artery Disease Father     Benign prostatic hyperplasia Paternal Uncle     Benign prostatic hyperplasia Paternal Grandfather         Social History     Socioeconomic History    Marital status:      Spouse name: Not on file    Number of children: Not on file    Years of education: Not on file    Highest education level: Not on file   Occupational History    Not on file   Tobacco Use    Smoking status: Never    Smokeless tobacco: Never   Vaping Use    Vaping status: Never Used   Substance and Sexual Activity    Alcohol use: Not Currently     Comment: rare    Drug use: No    Sexual activity: Yes     Partners: Female     Birth control/protection: None   Other Topics Concern    Parent/sibling w/ CABG, MI or angioplasty before 65F 55M? Yes     Comment: Father   Social History Narrative    Not on file     Social Determinants of Health     Financial Resource Strain: Low Risk  (6/3/2024)    Financial Resource Strain     Within the  past 12 months, have you or your family members you live with been unable to get utilities (heat, electricity) when it was really needed?: No   Food Insecurity: Low Risk  (6/3/2024)    Food Insecurity     Within the past 12 months, did you worry that your food would run out before you got money to buy more?: No     Within the past 12 months, did the food you bought just not last and you didn t have money to get more?: No   Transportation Needs: Low Risk  (6/3/2024)    Transportation Needs     Within the past 12 months, has lack of transportation kept you from medical appointments, getting your medicines, non-medical meetings or appointments, work, or from getting things that you need?: No   Physical Activity: Insufficiently Active (6/3/2024)    Exercise Vital Sign     Days of Exercise per Week: 5 days     Minutes of Exercise per Session: 20 min   Stress: No Stress Concern Present (6/3/2024)    [employer] [place] [employer] - Occupational Stress Questionnaire     Feeling of Stress : Not at all   Social Connections: Unknown (6/3/2024)    Social Connection and Isolation Panel [NHANES]     Frequency of Communication with Friends and Family: Not on file     Frequency of Social Gatherings with Friends and Family: Twice a week     Attends [employer] Services: Not on file     Active Member of Clubs or Organizations: Not on file     Attends Club or Organization Meetings: Not on file     Marital Status: Not on file   Interpersonal Safety: Low Risk  (6/4/2024)    Interpersonal Safety     Do you feel physically and emotionally safe where you currently live?: Yes     Within the past 12 months, have you been hit, slapped, kicked or otherwise physically hurt by someone?: No     Within the past 12 months, have you been humiliated or emotionally abused in other ways by your partner or ex-partner?: No   Housing Stability: Low Risk  (6/3/2024)    Housing Stability     Do you have housing? : Yes     Are you worried about  "losing your housing?: No           Medications  Allergies   Current Outpatient Medications   Medication Sig Dispense Refill    acetaminophen (TYLENOL) 325 MG tablet Take 325-650 mg by mouth as needed for pain      apixaban ANTICOAGULANT (ELIQUIS ANTICOAGULANT) 5 MG tablet Take 1 tablet (5 mg) by mouth 2 times daily 180 tablet 3    atorvastatin (LIPITOR) 40 MG tablet TAKE ONE TABLET BY MOUTH ONCE DAILY 90 tablet 0    carvedilol (COREG) 12.5 MG tablet Take 1 tablet (12.5 mg) by mouth 2 times daily (with meals) 180 tablet 0    lisinopril (ZESTRIL) 10 MG tablet TAKE 1 TABLET BY MOUTH DAILY. 90 tablet 2    loratadine (CLARITIN) 10 mg tablet [LORATADINE (CLARITIN) 10 MG TABLET] Take 10 mg by mouth daily as needed.        No Known Allergies       Lab Results    Chemistry/lipid CBC Cardiac Enzymes/BNP/TSH/INR   Recent Labs   Lab Test 06/04/24  1341   CHOL 97   HDL 31*   LDL 51   TRIG 76     Recent Labs   Lab Test 06/04/24  1341 05/08/23  0921 12/14/20  1558   LDL 51 47 49     Recent Labs   Lab Test 06/04/24  1341      POTASSIUM 4.4   CHLORIDE 107   CO2 24   *   BUN 16.9   CR 0.97   GFRESTIMATED 83   RENE 9.2     Recent Labs   Lab Test 06/04/24  1341 05/08/23  0921 03/20/23  1436   CR 0.97 1.06 1.2     No results for input(s): \"A1C\" in the last 57664 hours.       Recent Labs   Lab Test 06/04/24  1341   WBC 5.7   HGB 15.7   HCT 46.5   MCV 87        Recent Labs   Lab Test 06/04/24  1341 05/08/23  0921 07/07/22  0744   HGB 15.7 15.2 14.1    No results for input(s): \"TROPONINI\" in the last 28996 hours.  No results for input(s): \"BNP\", \"NTBNPI\", \"NTBNP\" in the last 28499 hours.  Recent Labs   Lab Test 06/04/24  1341   TSH 1.97     Recent Labs   Lab Test 12/10/21  1124   INR 1.06        Marline Ohara MD                                      "

## 2024-08-23 NOTE — LETTER
8/23/2024    Brad Roldan MD  [address]  [facility] [address]    RE: Harrison Barrientos       Dear Colleague,     I had the pleasure of seeing Harrison Barrientos in the [facility].    HEART CARE ENCOUNTER CONSULTATON NOTE      [facility]  [phone]      Assessment/Recommendations   Assessment:  1.  Coronary artery disease: Status post anterior myocardial infarction in 2004. Intermittent chest pain noted about a month ago that was not necessarily with exertion. Will proceed with stress testing.   2.  Hypertension: Well-controlled today  3.  Dyslipidemia: Recent lipids reviewed and well controlled  4.  Ischemic cardiomyopathy: Left ventricular ejection fraction 40%  5.  Intracardiac thrombus: Small mural thrombus noted on cardiac MRI      Plan:  1.  Continue Eliquis  2.  Continue on high-dose statin therapy, carvedilol and lisinopril  3.   Lexiscan nuclear stress test  Follow-up in a year         History of Present Illness/Subjective    HPI: Harrison Barrientos is a 74 year old male with history of ischemic cardiomyopathy with left ventricular ejection fraction of 40% status post anterior MI in 2004 with persistent left ventricular mural thrombus who is here for follow-up. He continues to exercise regularly. He does the recumbent bike for 10 minutes and elliptical for 15 minutes every other day and walks his dog. He is recovering from COVID infection from last week. About a month ago he developed an intermittent left sided chest ache not necessarily with activity. It has been improving more recently.     Cardiac MR 5/12/22  1. Normal left ventricular size with moderate reduction in left ventricular systolic function. The  quantified left ventricular ejection fraction is 41%. There is akinesis of the entire apex, distal  anteroseptum and distal anterior wall segment.    Severe hypokinesis of the anteroseptal wall and mid  anterior wall segments.  2.  Non-transmural  myocardial infarction involving the basal to mid anteroseptal and mid anterior wall  involving 50% of myocardium representing possibly viable myocardium.  Transmural myocardial infarction  involving distal anteroseptal, apical anterior wall and entire apex of the left ventricle representing  nonviable myocardium.    3. Very small area of mural thrombus involving the distal anteroseptal and apical septal wall segments  which is grossly .  4.  Normal right ventricular size and systolic function.   5.  There is mild aortic regurgitation.   6.  Mild ascending aortic enlargement at 42 mm (prior 41).  Mild aortic root enlargement at 42 mm (prior 41  mm).       Compared to prior study on 12/2/2021, there remain a very small area of chronic mural thrombus over the  distal anteroseptal wall which has decrease in size.  No change in ischemic cardiomyopathy or left  ventricular systolic function.          The longitudinal plan of care for the diagnosis(es)/condition(s) as documented were addressed during this visit. Due to the added complexity in care, I will continue to support Harrison in the subsequent management and with ongoing continuity of care.         Physical Examination  Review of Systems   Vitals: /66 (BP Location: Right arm, Patient Position: Sitting, Cuff Size: Adult Large)   Pulse 57   Resp 14   Wt 93 kg (205 lb)   BMI 27.05 kg/m    BMI= Body mass index is 27.05 kg/m .  Wt Readings from Last 3 Encounters:   08/23/24 93 kg (205 lb)   06/04/24 96 kg (211 lb 9.6 oz)   07/26/23 93.9 kg (207 lb)       General Appearance:   no distress, normal body habitus   ENT/Mouth: membranes moist, no oral lesions or bleeding gums.      EYES:  no scleral icterus, normal conjunctivae   Neck: no carotid bruits or thyromegaly   Chest/Lungs:   lungs are clear to auscultation   Cardiovascular:   Regular. Normal first and second heart sounds with no murmur no edema bilaterally    Abdomen:  no organomegaly, masses, bruits, or  tenderness; bowel sounds are present   Extremities: no cyanosis or clubbing   Skin: no xanthelasma, warm.    Neurologic: normal  bilateral, no tremors     Psychiatric: alert and oriented x3, calm        Please refer above for cardiac ROS details.        Medical History  Surgical History Family History Social History   Past Medical History:   Diagnosis Date     Antiplatelet or antithrombotic long-term use      Arthritis      BPH (benign prostatic hyperplasia)      Coronary artery disease      Diverticulosis      Hyperlipidemia      Hypertension      Ischemic cardiomyopathy      Myocardial infarction (H) 2004     Prostate hyperplasia, benign localized, without urinary obstruction      Squamous cell carcinoma of skin      Stented coronary artery      Past Surgical History:   Procedure Laterality Date     ARTHROPLASTY KNEE Right 12/13/2021    Procedure: RIGHT TOTAL KNEE ARTHROPLASTY;  Surgeon: Magdi Estrada MD;  Location: [facility]     BIOPSY  2016     CORONARY STENT PLACEMENT       CYSTOSCOPY PROSTATE W/ LASER N/A 11/29/2016    Procedure: CYSTOSCOPY WITH TRANSURETHRAL RESECTION OF THE PROSTATE WITH QUANTA LASER;  Surgeon: Tre Cuadra MD;  Location: [facility];  Service:      EXCHANGE POLY COMPONENT ARTHROPLASTY KNEE Right 07/06/2022    Procedure: RIGHT TOTAL KNEE SCAR EXCISION;  Surgeon: Magdi Estrada MD;  Location: [facility]     GENITOURINARY SURGERY  2017     Family History   Problem Relation Age of Onset     Heart Disease Mother         coronary stents     Coronary Artery Disease Mother      Hypertension Mother      Benign prostatic hyperplasia Father      Heart Disease Father      Kidney Cancer Father      Coronary Artery Disease Father      Benign prostatic hyperplasia Paternal Uncle      Benign prostatic hyperplasia Paternal Grandfather         Social History     Socioeconomic History     Marital status:      Spouse name: Not on file     Number of children: Not  on file     Years of education: Not on file     Highest education level: Not on file   Occupational History     Not on file   Tobacco Use     Smoking status: Never     Smokeless tobacco: Never   Vaping Use     Vaping status: Never Used   Substance and Sexual Activity     Alcohol use: Not Currently     Comment: rare     Drug use: No     Sexual activity: Yes     Partners: Female     Birth control/protection: None   Other Topics Concern     Parent/sibling w/ CABG, MI or angioplasty before 65F 55M? Yes     Comment: Father   Social History Narrative     Not on file     Social Determinants of Health     Financial Resource Strain: Low Risk  (6/3/2024)    Financial Resource Strain      Within the past 12 months, have you or your family members you live with been unable to get utilities (heat, electricity) when it was really needed?: No   Food Insecurity: Low Risk  (6/3/2024)    Food Insecurity      Within the past 12 months, did you worry that your food would run out before you got money to buy more?: No      Within the past 12 months, did the food you bought just not last and you didn t have money to get more?: No   Transportation Needs: Low Risk  (6/3/2024)    Transportation Needs      Within the past 12 months, has lack of transportation kept you from medical appointments, getting your medicines, non-medical meetings or appointments, work, or from getting things that you need?: No   Physical Activity: Insufficiently Active (6/3/2024)    Exercise Vital Sign      Days of Exercise per Week: 5 days      Minutes of Exercise per Session: 20 min   Stress: No Stress Concern Present (6/3/2024)    [employer] [place] [employer] - Occupational Stress Questionnaire      Feeling of Stress : Not at all   Social Connections: Unknown (6/3/2024)    Social Connection and Isolation Panel [NHANES]      Frequency of Communication with Friends and Family: Not on file      Frequency of Social Gatherings with Friends and Family: Twice  a week      Attends Baptism Services: Not on file      Active Member of Clubs or Organizations: Not on file      Attends Club or Organization Meetings: Not on file      Marital Status: Not on file   Interpersonal Safety: Low Risk  (6/4/2024)    Interpersonal Safety      Do you feel physically and emotionally safe where you currently live?: Yes      Within the past 12 months, have you been hit, slapped, kicked or otherwise physically hurt by someone?: No      Within the past 12 months, have you been humiliated or emotionally abused in other ways by your partner or ex-partner?: No   Housing Stability: Low Risk  (6/3/2024)    Housing Stability      Do you have housing? : Yes      Are you worried about losing your housing?: No           Medications  Allergies   Current Outpatient Medications   Medication Sig Dispense Refill     acetaminophen (TYLENOL) 325 MG tablet Take 325-650 mg by mouth as needed for pain       apixaban ANTICOAGULANT (ELIQUIS ANTICOAGULANT) 5 MG tablet Take 1 tablet (5 mg) by mouth 2 times daily 180 tablet 3     atorvastatin (LIPITOR) 40 MG tablet TAKE ONE TABLET BY MOUTH ONCE DAILY 90 tablet 0     carvedilol (COREG) 12.5 MG tablet Take 1 tablet (12.5 mg) by mouth 2 times daily (with meals) 180 tablet 0     lisinopril (ZESTRIL) 10 MG tablet TAKE 1 TABLET BY MOUTH DAILY. 90 tablet 2     loratadine (CLARITIN) 10 mg tablet [LORATADINE (CLARITIN) 10 MG TABLET] Take 10 mg by mouth daily as needed.        No Known Allergies       Lab Results    Chemistry/lipid CBC Cardiac Enzymes/BNP/TSH/INR   Recent Labs   Lab Test 06/04/24  1341   CHOL 97   HDL 31*   LDL 51   TRIG 76     Recent Labs   Lab Test 06/04/24  1341 05/08/23  0921 12/14/20  1558   LDL 51 47 49     Recent Labs   Lab Test 06/04/24  1341      POTASSIUM 4.4   CHLORIDE 107   CO2 24   *   BUN 16.9   CR 0.97   GFRESTIMATED 83   RENE 9.2     Recent Labs   Lab Test 06/04/24  1341 05/08/23  0921 03/20/23  1436   CR 0.97 1.06 1.2     No  "results for input(s): \"A1C\" in the last 27230 hours.       Recent Labs   Lab Test 06/04/24  1341   WBC 5.7   HGB 15.7   HCT 46.5   MCV 87        Recent Labs   Lab Test 06/04/24  1341 05/08/23  0921 07/07/22  0744   HGB 15.7 15.2 14.1    No results for input(s): \"TROPONINI\" in the last 88362 hours.  No results for input(s): \"BNP\", \"NTBNPI\", \"NTBNP\" in the last 19625 hours.  Recent Labs   Lab Test 06/04/24  1341   TSH 1.97     Recent Labs   Lab Test 12/10/21  1124   INR 1.06        Marline Ohara MD                                        Thank you for allowing me to participate in the care of your patient.      Sincerely,     Marline Ohara MD     [facility]  cc:   Marline Ohara MD  [address] [facility]  [address]  [place] [address]      "

## 2024-08-23 NOTE — PROGRESS NOTES
HEART CARE ENCOUNTER CONSULTATON NOTE      [facility]  [phone]      Assessment/Recommendations   Assessment:  1.  Coronary artery disease: Status post anterior myocardial infarction in 2004. Intermittent chest pain noted about a month ago that was not necessarily with exertion. Will proceed with stress testing.   2.  Hypertension: Well-controlled today  3.  Dyslipidemia: Recent lipids reviewed and well controlled  4.  Ischemic cardiomyopathy: Left ventricular ejection fraction 40%  5.  Intracardiac thrombus: Small mural thrombus noted on cardiac MRI      Plan:  1.  Continue Eliquis  2.  Continue on high-dose statin therapy, carvedilol and lisinopril  3.   Lexiscan nuclear stress test  Follow-up in a year         History of Present Illness/Subjective    HPI: Harrison Barrientos is a 74 year old male with history of ischemic cardiomyopathy with left ventricular ejection fraction of 40% status post anterior MI in 2004 with persistent left ventricular mural thrombus who is here for follow-up. He continues to exercise regularly. He does the recumbent bike for 10 minutes and elliptical for 15 minutes every other day and walks his dog. He is recovering from COVID infection from last week. About a month ago he developed an intermittent left sided chest ache not necessarily with activity. It has been improving more recently.     Cardiac MR 5/12/22  1. Normal left ventricular size with moderate reduction in left ventricular systolic function. The  quantified left ventricular ejection fraction is 41%. There is akinesis of the entire apex, distal  anteroseptum and distal anterior wall segment.    Severe hypokinesis of the anteroseptal wall and mid  anterior wall segments.  2.  Non-transmural myocardial infarction involving the basal to mid anteroseptal and mid anterior wall  involving 50% of myocardium representing possibly viable myocardium.  Transmural myocardial infarction  involving distal  anteroseptal, apical anterior wall and entire apex of the left ventricle representing  nonviable myocardium.    3. Very small area of mural thrombus involving the distal anteroseptal and apical septal wall segments  which is grossly .  4.  Normal right ventricular size and systolic function.   5.  There is mild aortic regurgitation.   6.  Mild ascending aortic enlargement at 42 mm (prior 41).  Mild aortic root enlargement at 42 mm (prior 41  mm).       Compared to prior study on 12/2/2021, there remain a very small area of chronic mural thrombus over the  distal anteroseptal wall which has decrease in size.  No change in ischemic cardiomyopathy or left  ventricular systolic function.          The longitudinal plan of care for the diagnosis(es)/condition(s) as documented were addressed during this visit. Due to the added complexity in care, I will continue to support Harrison in the subsequent management and with ongoing continuity of care.         Physical Examination  Review of Systems   Vitals: /66 (BP Location: Right arm, Patient Position: Sitting, Cuff Size: Adult Large)   Pulse 57   Resp 14   Wt 93 kg (205 lb)   BMI 27.05 kg/m    BMI= Body mass index is 27.05 kg/m .  Wt Readings from Last 3 Encounters:   08/23/24 93 kg (205 lb)   06/04/24 96 kg (211 lb 9.6 oz)   07/26/23 93.9 kg (207 lb)       General Appearance:   no distress, normal body habitus   ENT/Mouth: membranes moist, no oral lesions or bleeding gums.      EYES:  no scleral icterus, normal conjunctivae   Neck: no carotid bruits or thyromegaly   Chest/Lungs:   lungs are clear to auscultation   Cardiovascular:   Regular. Normal first and second heart sounds with no murmur no edema bilaterally    Abdomen:  no organomegaly, masses, bruits, or tenderness; bowel sounds are present   Extremities: no cyanosis or clubbing   Skin: no xanthelasma, warm.    Neurologic: normal  bilateral, no tremors     Psychiatric: alert and oriented x3, calm         Please refer above for cardiac ROS details.        Medical History  Surgical History Family History Social History   Past Medical History:   Diagnosis Date    Antiplatelet or antithrombotic long-term use     Arthritis     BPH (benign prostatic hyperplasia)     Coronary artery disease     Diverticulosis     Hyperlipidemia     Hypertension     Ischemic cardiomyopathy     Myocardial infarction (H) 2004    Prostate hyperplasia, benign localized, without urinary obstruction     Squamous cell carcinoma of skin     Stented coronary artery      Past Surgical History:   Procedure Laterality Date    ARTHROPLASTY KNEE Right 12/13/2021    Procedure: RIGHT TOTAL KNEE ARTHROPLASTY;  Surgeon: Magdi Estrada MD;  Location: [facility]    BIOPSY  2016    CORONARY STENT PLACEMENT      CYSTOSCOPY PROSTATE W/ LASER N/A 11/29/2016    Procedure: CYSTOSCOPY WITH TRANSURETHRAL RESECTION OF THE PROSTATE WITH QUANTA LASER;  Surgeon: Tre Cuadra MD;  Location: [facility];  Service:     EXCHANGE POLY COMPONENT ARTHROPLASTY KNEE Right 07/06/2022    Procedure: RIGHT TOTAL KNEE SCAR EXCISION;  Surgeon: Magdi Estrada MD;  Location: [facility]    GENITOURINARY SURGERY  2017     Family History   Problem Relation Age of Onset    Heart Disease Mother         coronary stents    Coronary Artery Disease Mother     Hypertension Mother     Benign prostatic hyperplasia Father     Heart Disease Father     Kidney Cancer Father     Coronary Artery Disease Father     Benign prostatic hyperplasia Paternal Uncle     Benign prostatic hyperplasia Paternal Grandfather         Social History     Socioeconomic History    Marital status:      Spouse name: Not on file    Number of children: Not on file    Years of education: Not on file    Highest education level: Not on file   Occupational History    Not on file   Tobacco Use    Smoking status: Never    Smokeless tobacco: Never   Vaping Use    Vaping status: Never Used    Substance and Sexual Activity    Alcohol use: Not Currently     Comment: rare    Drug use: No    Sexual activity: Yes     Partners: Female     Birth control/protection: None   Other Topics Concern    Parent/sibling w/ CABG, MI or angioplasty before 65F 55M? Yes     Comment: Father   Social History Narrative    Not on file     Social Determinants of Health     Financial Resource Strain: Low Risk  (6/3/2024)    Financial Resource Strain     Within the past 12 months, have you or your family members you live with been unable to get utilities (heat, electricity) when it was really needed?: No   Food Insecurity: Low Risk  (6/3/2024)    Food Insecurity     Within the past 12 months, did you worry that your food would run out before you got money to buy more?: No     Within the past 12 months, did the food you bought just not last and you didn t have money to get more?: No   Transportation Needs: Low Risk  (6/3/2024)    Transportation Needs     Within the past 12 months, has lack of transportation kept you from medical appointments, getting your medicines, non-medical meetings or appointments, work, or from getting things that you need?: No   Physical Activity: Insufficiently Active (6/3/2024)    Exercise Vital Sign     Days of Exercise per Week: 5 days     Minutes of Exercise per Session: 20 min   Stress: No Stress Concern Present (6/3/2024)    [employer] - Occupational Stress Questionnaire     Feeling of Stress : Not at all   Social Connections: Unknown (6/3/2024)    Social Connection and Isolation Panel [NHANES]     Frequency of Communication with Friends and Family: Not on file     Frequency of Social Gatherings with Friends and Family: Twice a week     Attends Confucianism Services: Not on file     Active Member of Clubs or Organizations: Not on file     Attends Club or Organization Meetings: Not on file     Marital Status: Not on file   Interpersonal Safety: Low Risk  (6/4/2024)     "Interpersonal Safety     Do you feel physically and emotionally safe where you currently live?: Yes     Within the past 12 months, have you been hit, slapped, kicked or otherwise physically hurt by someone?: No     Within the past 12 months, have you been humiliated or emotionally abused in other ways by your partner or ex-partner?: No   Housing Stability: Low Risk  (6/3/2024)    Housing Stability     Do you have housing? : Yes     Are you worried about losing your housing?: No           Medications  Allergies   Current Outpatient Medications   Medication Sig Dispense Refill    acetaminophen (TYLENOL) 325 MG tablet Take 325-650 mg by mouth as needed for pain      apixaban ANTICOAGULANT (ELIQUIS ANTICOAGULANT) 5 MG tablet Take 1 tablet (5 mg) by mouth 2 times daily 180 tablet 3    atorvastatin (LIPITOR) 40 MG tablet TAKE ONE TABLET BY MOUTH ONCE DAILY 90 tablet 0    carvedilol (COREG) 12.5 MG tablet Take 1 tablet (12.5 mg) by mouth 2 times daily (with meals) 180 tablet 0    lisinopril (ZESTRIL) 10 MG tablet TAKE 1 TABLET BY MOUTH DAILY. 90 tablet 2    loratadine (CLARITIN) 10 mg tablet [LORATADINE (CLARITIN) 10 MG TABLET] Take 10 mg by mouth daily as needed.        No Known Allergies       Lab Results    Chemistry/lipid CBC Cardiac Enzymes/BNP/TSH/INR   Recent Labs   Lab Test 06/04/24  1341   CHOL 97   HDL 31*   LDL 51   TRIG 76     Recent Labs   Lab Test 06/04/24  1341 05/08/23  0921 12/14/20  1558   LDL 51 47 49     Recent Labs   Lab Test 06/04/24  1341      POTASSIUM 4.4   CHLORIDE 107   CO2 24   *   BUN 16.9   CR 0.97   GFRESTIMATED 83   RENE 9.2     Recent Labs   Lab Test 06/04/24  1341 05/08/23  0921 03/20/23  1436   CR 0.97 1.06 1.2     No results for input(s): \"A1C\" in the last 17463 hours.       Recent Labs   Lab Test 06/04/24  1341   WBC 5.7   HGB 15.7   HCT 46.5   MCV 87        Recent Labs   Lab Test 06/04/24  1341 05/08/23  0921 07/07/22  0744   HGB 15.7 15.2 14.1    No results for " "input(s): \"TROPONINI\" in the last 13093 hours.  No results for input(s): \"BNP\", \"NTBNPI\", \"NTBNP\" in the last 39409 hours.  Recent Labs   Lab Test 06/04/24  1341   TSH 1.97     Recent Labs   Lab Test 12/10/21  1124   INR 1.06        Marline Ohara MD                                      "

## 2024-08-28 ENCOUNTER — HOSPITAL ENCOUNTER (OUTPATIENT)
Dept: NUCLEAR MEDICINE | Facility: HOSPITAL | Age: 74
Discharge: HOME | End: 2024-08-28
Attending: INTERNAL MEDICINE
Payer: COMMERCIAL

## 2024-08-28 ENCOUNTER — HOSPITAL ENCOUNTER (OUTPATIENT)
Dept: CARDIOLOGY | Facility: HOSPITAL | Age: 74
Discharge: HOME | End: 2024-08-28
Attending: INTERNAL MEDICINE
Payer: COMMERCIAL

## 2024-08-28 DIAGNOSIS — I25.5 ISCHEMIC CARDIOMYOPATHY: ICD-10-CM

## 2024-08-28 DIAGNOSIS — I25.119 CORONARY ARTERY DISEASE INVOLVING NATIVE CORONARY ARTERY OF NATIVE HEART WITH ANGINA PECTORIS (H): ICD-10-CM

## 2024-08-28 LAB
CV STRESS CURRENT BP HE: NORMAL
CV STRESS CURRENT HR HE: 54
CV STRESS CURRENT HR HE: 55
CV STRESS CURRENT HR HE: 59
CV STRESS CURRENT HR HE: 59
CV STRESS CURRENT HR HE: 60
CV STRESS CURRENT HR HE: 61
CV STRESS CURRENT HR HE: 62
CV STRESS CURRENT HR HE: 68
CV STRESS CURRENT HR HE: 69
CV STRESS CURRENT HR HE: 74
CV STRESS CURRENT HR HE: 83
CV STRESS CURRENT HR HE: 85
CV STRESS CURRENT HR HE: 86
CV STRESS CURRENT HR HE: 86
CV STRESS DEVIATION TIME HE: NORMAL
CV STRESS ECHO PERCENT HR HE: NORMAL
CV STRESS EXERCISE STAGE HE: NORMAL
CV STRESS FINAL RESTING BP HE: NORMAL
CV STRESS FINAL RESTING HR HE: 59
CV STRESS MAX HR HE: 88
CV STRESS MAX TREADMILL GRADE HE: 0
CV STRESS MAX TREADMILL SPEED HE: 0
CV STRESS PEAK DIA BP HE: NORMAL
CV STRESS PEAK SYS BP HE: NORMAL
CV STRESS PHASE HE: NORMAL
CV STRESS PROTOCOL HE: NORMAL
CV STRESS RESTING PT POSITION HE: NORMAL
CV STRESS ST DEVIATION AMOUNT HE: NORMAL
CV STRESS ST DEVIATION ELEVATION HE: NORMAL
CV STRESS ST EVELATION AMOUNT HE: NORMAL
CV STRESS TEST TYPE HE: NORMAL
CV STRESS TOTAL STAGE TIME MIN 1 HE: NORMAL
RATE PRESSURE PRODUCT: NORMAL
STRESS ECHO BASELINE DIASTOLIC HE: 70
STRESS ECHO BASELINE HR: 51
STRESS ECHO BASELINE SYSTOLIC BP: 148
STRESS ECHO CALCULATED PERCENT HR: 59 %
STRESS ECHO LAST STRESS DIASTOLIC BP: 73
STRESS ECHO LAST STRESS HR: 74
STRESS ECHO LAST STRESS SYSTOLIC BP: 139
STRESS ECHO TARGET HR: 148

## 2024-08-28 PROCEDURE — 78452 HT MUSCLE IMAGE SPECT MULT: CPT | Mod: 26 | Performed by: INTERNAL MEDICINE

## 2024-08-28 PROCEDURE — A9500 TC99M SESTAMIBI: HCPCS | Performed by: INTERNAL MEDICINE

## 2024-08-28 PROCEDURE — 93017 CV STRESS TEST TRACING ONLY: CPT

## 2024-08-28 PROCEDURE — 93018 CV STRESS TEST I&R ONLY: CPT | Performed by: INTERNAL MEDICINE

## 2024-08-28 PROCEDURE — 250N000011 HC RX IP 250 OP 636: Performed by: INTERNAL MEDICINE

## 2024-08-28 PROCEDURE — 343N000001 HC RX 343: Performed by: INTERNAL MEDICINE

## 2024-08-28 PROCEDURE — 93016 CV STRESS TEST SUPVJ ONLY: CPT | Performed by: INTERNAL MEDICINE

## 2024-08-28 PROCEDURE — 78452 HT MUSCLE IMAGE SPECT MULT: CPT

## 2024-08-28 RX ORDER — CAFFEINE 200 MG
200 TABLET ORAL
Status: DISCONTINUED | OUTPATIENT
Start: 2024-08-28 | End: 2024-08-28 | Stop reason: HOSPADM

## 2024-08-28 RX ORDER — REGADENOSON 0.08 MG/ML
0.4 INJECTION, SOLUTION INTRAVENOUS ONCE
Status: COMPLETED | OUTPATIENT
Start: 2024-08-28 | End: 2024-08-28

## 2024-08-28 RX ORDER — AMINOPHYLLINE 25 MG/ML
50-100 INJECTION, SOLUTION INTRAVENOUS
Status: COMPLETED | OUTPATIENT
Start: 2024-08-28 | End: 2024-08-28

## 2024-08-28 RX ORDER — CARVEDILOL 12.5 MG/1
12.5 TABLET ORAL 2 TIMES DAILY WITH MEALS
Qty: 180 TABLET | Refills: 2 | Status: SHIPPED | OUTPATIENT
Start: 2024-08-28

## 2024-08-28 RX ADMIN — Medication 30.8 MILLICURIE: at 08:00

## 2024-08-28 RX ADMIN — REGADENOSON 0.4 MG: 0.08 INJECTION, SOLUTION INTRAVENOUS at 08:01

## 2024-08-28 RX ADMIN — AMINOPHYLLINE 50 MG: 25 INJECTION, SOLUTION INTRAVENOUS at 08:05

## 2024-08-28 RX ADMIN — Medication 8 MILLICURIE: at 06:43

## 2024-09-13 ENCOUNTER — TELEPHONE (OUTPATIENT)
Dept: CARDIOLOGY | Facility: CLINIC | Age: 74
End: 2024-09-13
Payer: COMMERCIAL

## 2024-09-13 NOTE — TELEPHONE ENCOUNTER
[facility] Call Center    Phone Message    May a detailed message be left on voicemail: yes     Reason for Call: Other: [facility] called requesting a 2 day hold of apixaban ANTICOAGULANT (ELIQUIS ANTICOAGULANT) 5 MG tablet prior to the patients colonoscopy on Oct 23. If unable please do send a creatinine level for 90 days. Please call [facility] for further information.      Action Taken: Other: Cardiology    Travel Screening: Not Applicable     Thank you!  [facility]

## 2024-09-13 NOTE — TELEPHONE ENCOUNTER
Dr. Ohara,  Okay for patient to hold Eliquis for 2 days prior to colonoscopy?  Thank you,  Amber      Intracardiac thrombus: Small mural thrombus noted on Cardiac MR 5/12/22 .

## 2024-09-17 NOTE — TELEPHONE ENCOUNTER
Return call to [facility] - informed nurse of Dr. Ohara's response - nurse verbalized understanding of hold orders - no further questions / concerns offered at this time.  mg

## 2024-09-18 DIAGNOSIS — I51.3 MURAL THROMBUS OF HEART: ICD-10-CM

## 2024-09-18 RX ORDER — APIXABAN 5 MG/1
5 TABLET, FILM COATED ORAL 2 TIMES DAILY
Qty: 180 TABLET | Refills: 2 | Status: SHIPPED | OUTPATIENT
Start: 2024-09-18

## 2024-10-07 ENCOUNTER — TELEPHONE (OUTPATIENT)
Dept: CARDIOLOGY | Facility: CLINIC | Age: 74
End: 2024-10-07
Payer: COMMERCIAL

## 2024-10-07 NOTE — TELEPHONE ENCOUNTER
[facility] Call Center    Phone Message    May a detailed message be left on voicemail: yes     Reason for Call: Other: [facility] is looking to see if the patient would be clear from a cardiology perspective after their lexiscan 8/28/24. They are scheduled for a colonoscopy and would like to care team to call back and discuss this information. Thank you     Action Taken: Other: cardiology    Travel Screening: Not Applicable  Thank you!  [facility]         Date of Service:

## 2024-10-07 NOTE — TELEPHONE ENCOUNTER
Return call to [facility] - spoke to Deepthi who stated nurse was calling to see if any additional recommendations were given to patient after Lexiscan nuc completed due to dx of CM.    Per 8-28-24 Lexiscan nuc results note:  Marline Ohara MD  9/3/2024 11:08 AM CDT       Infarction without significant ischemia, no new recommendations     Informed Deepthi of Dr. Ohara's response and that results unchanged from 2020 - Deepthi verbalized understanding and confirmed Eliquis hold orders had previously been addressed 9-13-24.  mg

## 2024-10-23 ENCOUNTER — TRANSFERRED RECORDS (OUTPATIENT)
Dept: HEALTH INFORMATION MANAGEMENT | Facility: CLINIC | Age: 74
End: 2024-10-23
Payer: COMMERCIAL

## 2024-11-09 DIAGNOSIS — E78.00 HYPERCHOLESTEROLEMIA: ICD-10-CM

## 2024-11-09 DIAGNOSIS — E78.5 HYPERLIPEMIA: ICD-10-CM

## 2024-11-12 RX ORDER — ATORVASTATIN CALCIUM 40 MG/1
40 TABLET, FILM COATED ORAL DAILY
Qty: 90 TABLET | Refills: 2 | Status: SHIPPED | OUTPATIENT
Start: 2024-11-12

## 2024-11-14 ENCOUNTER — IMMUNIZATION (OUTPATIENT)
Dept: FAMILY MEDICINE | Facility: CLINIC | Age: 74
End: 2024-11-14
Payer: COMMERCIAL

## 2024-11-14 PROCEDURE — 90480 ADMN SARSCOV2 VAC 1/ONLY CMP: CPT

## 2024-11-14 PROCEDURE — 91320 SARSCV2 VAC 30MCG TRS-SUC IM: CPT

## 2024-11-14 PROCEDURE — 90662 IIV NO PRSV INCREASED AG IM: CPT

## 2024-11-14 PROCEDURE — G0008 ADMIN INFLUENZA VIRUS VAC: HCPCS

## 2025-04-30 DIAGNOSIS — I51.3 MURAL THROMBUS OF HEART: ICD-10-CM

## 2025-04-30 RX ORDER — APIXABAN 5 MG/1
5 TABLET, FILM COATED ORAL 2 TIMES DAILY
Qty: 180 TABLET | Refills: 1 | Status: SHIPPED | OUTPATIENT
Start: 2025-04-30

## 2025-05-05 ENCOUNTER — PATIENT OUTREACH (OUTPATIENT)
Dept: CARE COORDINATION | Facility: CLINIC | Age: 75
End: 2025-05-05
Payer: COMMERCIAL

## 2025-05-10 ENCOUNTER — HEALTH MAINTENANCE LETTER (OUTPATIENT)
Age: 75
End: 2025-05-10

## 2025-05-15 DIAGNOSIS — I25.5 ISCHEMIC CARDIOMYOPATHY: ICD-10-CM

## 2025-05-15 RX ORDER — CARVEDILOL 12.5 MG/1
12.5 TABLET ORAL 2 TIMES DAILY WITH MEALS
Qty: 180 TABLET | Refills: 0 | Status: SHIPPED | OUTPATIENT
Start: 2025-05-15

## 2025-06-17 SDOH — HEALTH STABILITY: PHYSICAL HEALTH: ON AVERAGE, HOW MANY MINUTES DO YOU ENGAGE IN EXERCISE AT THIS LEVEL?: 30 MIN

## 2025-06-17 SDOH — HEALTH STABILITY: PHYSICAL HEALTH: ON AVERAGE, HOW MANY DAYS PER WEEK DO YOU ENGAGE IN MODERATE TO STRENUOUS EXERCISE (LIKE A BRISK WALK)?: 3 DAYS

## 2025-06-17 ASSESSMENT — SOCIAL DETERMINANTS OF HEALTH (SDOH): HOW OFTEN DO YOU GET TOGETHER WITH FRIENDS OR RELATIVES?: ONCE A WEEK

## 2025-06-18 ENCOUNTER — RESULTS FOLLOW-UP (OUTPATIENT)
Dept: FAMILY MEDICINE | Facility: CLINIC | Age: 75
End: 2025-06-18

## 2025-06-18 ENCOUNTER — OFFICE VISIT (OUTPATIENT)
Dept: FAMILY MEDICINE | Facility: CLINIC | Age: 75
End: 2025-06-18
Attending: FAMILY MEDICINE
Payer: COMMERCIAL

## 2025-06-18 VITALS
SYSTOLIC BLOOD PRESSURE: 139 MMHG | OXYGEN SATURATION: 98 % | WEIGHT: 206.8 LBS | HEIGHT: 73 IN | HEART RATE: 56 BPM | RESPIRATION RATE: 16 BRPM | BODY MASS INDEX: 27.41 KG/M2 | TEMPERATURE: 97.7 F | DIASTOLIC BLOOD PRESSURE: 72 MMHG

## 2025-06-18 DIAGNOSIS — H93.13 TINNITUS, BILATERAL: ICD-10-CM

## 2025-06-18 DIAGNOSIS — R35.0 BENIGN PROSTATIC HYPERPLASIA WITH URINARY FREQUENCY: ICD-10-CM

## 2025-06-18 DIAGNOSIS — N40.1 BENIGN PROSTATIC HYPERPLASIA WITH URINARY FREQUENCY: ICD-10-CM

## 2025-06-18 DIAGNOSIS — I42.0 ISCHEMIC CONGESTIVE CARDIOMYOPATHY (H): ICD-10-CM

## 2025-06-18 DIAGNOSIS — K59.00 CONSTIPATION, UNSPECIFIED CONSTIPATION TYPE: ICD-10-CM

## 2025-06-18 DIAGNOSIS — I25.5 ISCHEMIC CONGESTIVE CARDIOMYOPATHY (H): ICD-10-CM

## 2025-06-18 DIAGNOSIS — I50.22 CHRONIC SYSTOLIC CONGESTIVE HEART FAILURE (H): ICD-10-CM

## 2025-06-18 DIAGNOSIS — H02.59 LAXITY OF EYELID: ICD-10-CM

## 2025-06-18 DIAGNOSIS — I25.10 ATHEROSCLEROSIS OF NATIVE CORONARY ARTERY OF NATIVE HEART WITHOUT ANGINA PECTORIS: ICD-10-CM

## 2025-06-18 DIAGNOSIS — Z00.00 ENCOUNTER FOR MEDICARE ANNUAL WELLNESS EXAM: Primary | ICD-10-CM

## 2025-06-18 DIAGNOSIS — M25.661 KNEE STIFFNESS, RIGHT: ICD-10-CM

## 2025-06-18 DIAGNOSIS — D12.5 ADENOMATOUS POLYP OF SIGMOID COLON: ICD-10-CM

## 2025-06-18 DIAGNOSIS — E78.00 HYPERCHOLESTEROLEMIA: ICD-10-CM

## 2025-06-18 DIAGNOSIS — Z96.651 S/P TOTAL KNEE ARTHROPLASTY, RIGHT: ICD-10-CM

## 2025-06-18 DIAGNOSIS — Z85.828 HISTORY OF SKIN CANCER: ICD-10-CM

## 2025-06-18 LAB
ALBUMIN SERPL BCG-MCNC: 4 G/DL (ref 3.5–5.2)
ALP SERPL-CCNC: 69 U/L (ref 40–150)
ALT SERPL W P-5'-P-CCNC: 28 U/L (ref 0–70)
ANION GAP SERPL CALCULATED.3IONS-SCNC: 8 MMOL/L (ref 7–15)
AST SERPL W P-5'-P-CCNC: 24 U/L (ref 0–45)
BILIRUB SERPL-MCNC: 0.9 MG/DL
BILIRUBIN DIRECT (ROCHE PRO & PURE): 0.39 MG/DL (ref 0–0.45)
BUN SERPL-MCNC: 17.4 MG/DL (ref 8–23)
CALCIUM SERPL-MCNC: 9.1 MG/DL (ref 8.8–10.4)
CHLORIDE SERPL-SCNC: 105 MMOL/L (ref 98–107)
CHOLEST SERPL-MCNC: 96 MG/DL
CREAT SERPL-MCNC: 0.97 MG/DL (ref 0.67–1.17)
EGFRCR SERPLBLD CKD-EPI 2021: 83 ML/MIN/1.73M2
ERYTHROCYTE [DISTWIDTH] IN BLOOD BY AUTOMATED COUNT: 12.5 % (ref 10–15)
FASTING STATUS PATIENT QL REPORTED: YES
FASTING STATUS PATIENT QL REPORTED: YES
GLUCOSE SERPL-MCNC: 123 MG/DL (ref 70–99)
HCO3 SERPL-SCNC: 27 MMOL/L (ref 22–29)
HCT VFR BLD AUTO: 47.3 % (ref 40–53)
HDLC SERPL-MCNC: 31 MG/DL
HGB BLD-MCNC: 15.6 G/DL (ref 13.3–17.7)
LDLC SERPL CALC-MCNC: 52 MG/DL
MCH RBC QN AUTO: 28.7 PG (ref 26.5–33)
MCHC RBC AUTO-ENTMCNC: 33 G/DL (ref 31.5–36.5)
MCV RBC AUTO: 87 FL (ref 78–100)
NONHDLC SERPL-MCNC: 65 MG/DL
PLATELET # BLD AUTO: 203 10E3/UL (ref 150–450)
POTASSIUM SERPL-SCNC: 4.3 MMOL/L (ref 3.4–5.3)
PROT SERPL-MCNC: 6.4 G/DL (ref 6.4–8.3)
RBC # BLD AUTO: 5.43 10E6/UL (ref 4.4–5.9)
SODIUM SERPL-SCNC: 140 MMOL/L (ref 135–145)
TRIGL SERPL-MCNC: 64 MG/DL
TSH SERPL DL<=0.005 MIU/L-ACNC: 2.27 UIU/ML (ref 0.3–4.2)
WBC # BLD AUTO: 6.7 10E3/UL (ref 4–11)

## 2025-06-18 PROCEDURE — G2211 COMPLEX E/M VISIT ADD ON: HCPCS | Performed by: FAMILY MEDICINE

## 2025-06-18 PROCEDURE — 80061 LIPID PANEL: CPT | Performed by: FAMILY MEDICINE

## 2025-06-18 PROCEDURE — 82248 BILIRUBIN DIRECT: CPT | Performed by: FAMILY MEDICINE

## 2025-06-18 PROCEDURE — 3075F SYST BP GE 130 - 139MM HG: CPT | Performed by: FAMILY MEDICINE

## 2025-06-18 PROCEDURE — 3048F LDL-C <100 MG/DL: CPT | Performed by: FAMILY MEDICINE

## 2025-06-18 PROCEDURE — 36415 COLL VENOUS BLD VENIPUNCTURE: CPT | Performed by: FAMILY MEDICINE

## 2025-06-18 PROCEDURE — 3078F DIAST BP <80 MM HG: CPT | Performed by: FAMILY MEDICINE

## 2025-06-18 PROCEDURE — 84443 ASSAY THYROID STIM HORMONE: CPT | Performed by: FAMILY MEDICINE

## 2025-06-18 PROCEDURE — 80053 COMPREHEN METABOLIC PANEL: CPT | Performed by: FAMILY MEDICINE

## 2025-06-18 PROCEDURE — G0439 PPPS, SUBSEQ VISIT: HCPCS | Performed by: FAMILY MEDICINE

## 2025-06-18 PROCEDURE — 99214 OFFICE O/P EST MOD 30 MIN: CPT | Mod: 25 | Performed by: FAMILY MEDICINE

## 2025-06-18 PROCEDURE — 85027 COMPLETE CBC AUTOMATED: CPT | Performed by: FAMILY MEDICINE

## 2025-06-18 NOTE — PROGRESS NOTES
Preventive Care Visit  [facility]  Brad Roldan MD, Family Medicine  Jun 18, 2025  {Provider  Link to SmartSet :995229}    {PROVIDER CHARTING PREFERENCE:999069}    Price Terry is a 74 year old, presenting for the following:  Wellness Visit        6/18/2025     8:11 AM   Additional Questions   Roomed by Stormy JOSE    Medical history is notable for coronary artery disease, ischemic cardiomyopathy, benign prostatic hypertrophy with obstructive symptoms, and an elevated PSA as well as hyperlipidemia.       He previously had a cystoscopy for evaluation hematuria and therefore was a candidate for resection of the prostate. Following the original cystoscopy he developed a urinary tract infection which was treated. He has had his procedure. Following the prostate resection he had some blood in his urine but that has resolved. He denies burning with urination, fever, or chills.  His urine flow has been fine.     Recent history is notable for low back pain.  He followed up with orthopedics and was treated conservatively.     He has intermittent constipation and is treated with Benefiber, stool softeners, an yogurt.     He has a skin lesion on his right cheek and behind his right ear that he would like frozen with liquid nitrogen.  He typically follows up with dermatology and did have the cheek lesion frozen previously.     Recent history is notable for occasional twinges of chest discomfort. He has noticed this when lifting weights. He uses an elliptical machine and bikes as well.      Regarding his cardiovascular history he had a previous myocardial infarction in 2004.  He had a single-vessel coronary artery disease with coronary stent placement in the proximal LAD at the time.  He has followed up with cardiology on a consistent basis for an ischemic cardiomyopathy.    A previous echocardiogram revealed an ejection fraction of 32% with mild mitral regurgitation and  mild tricuspid regurgitation.    A follow-up nuclear cardiac stress test was negative for inducible myocardial ischemia in 2020.  There was evidence of a previous transmural myocardial infarction in the mid to apical segments and anteroseptal segment.  There was akinesis in the mid to apical anterior wall and distal anteroseptal segment.       He continues to follow-up with Dr. Ohara, cardiology. In the meantime, he did have an abnormal cardiac MRI showing a moderate reduction in the left ventricular ejection fraction.  The ejection fraction was estimated to be 41%.  There was an area of nontransmural myocardial infarction.  Also, there was a small area of mural thrombus involving the distal anteroseptal and apical septal wall segments.  He has been treated with Eliquis.        Result Text       The stress electrocardiogram is negative for inducible ischemic EKG changes.    The nuclear stress test is abnormal.    There is a large area of transmural infarction in the anterior, apical and septal segment(s) of the left ventricle.    Left ventricular function is moderately reduced, 38% with hypokinesis of the septal and anterior walls    A prior study was conducted on 2/5/2020, no change compared to today's exam       {MA/LPN/RN Pre-Provider Visit Orders- hCG/UA/Strep (Optional):853662}  {SUPERLIST (Optional):295051}  {additonal problems for provider to add (Optional):989647}  Advance Care Planning  {The storyboard will display whether the patient has ACP docs on file. Hover over the Code section in the storyboard to access the ACP documents. :079462}  Patient states has Health Care Directive and will send to [facility] [employer].        6/17/2025   General Health   How would you rate your overall physical health? Good   Feel stress (tense, anxious, or unable to sleep) Not at all         6/17/2025   Nutrition   Diet: Regular (no restrictions)         6/17/2025   Exercise   Days per week of moderate/strenous exercise 3 days    Average minutes spent exercising at this level 30 min         6/17/2025   Social Factors   Frequency of gathering with friends or relatives Once a week   Worry food won't last until get money to buy more No   Food not last or not have enough money for food? No   Do you have housing? (Housing is defined as stable permanent housing and does not include staying outside in a car, in a tent, in an abandoned building, in an overnight shelter, or couch-surfing.) Yes   Are you worried about losing your housing? No   Lack of transportation? No   Unable to get utilities (heat,electricity)? No         6/17/2025   Fall Risk   Fallen 2 or more times in the past year? No   Trouble with walking or balance? No          6/17/2025   Activities of Daily Living- Home Safety   Needs help with the following daily activites None of the above   Safety concerns in the home None of the above         6/17/2025   Dental   Dentist two times every year? Yes         6/17/2025   Hearing Screening   Hearing concerns? (!) I NEED TO ASK PEOPLE TO SPEAK UP OR REPEAT THEMSELVES.         6/17/2025   Driving Risk Screening   Patient/family members have concerns about driving No         6/17/2025   General Alertness/Fatigue Screening   Have you been more tired than usual lately? No         6/17/2025   Urinary Incontinence Screening   Bothered by leaking urine in past 6 months No         Today's PHQ-2 Score:       6/17/2025     6:23 AM   PHQ-2 ( 1999 [employer])   Q1: Little interest or pleasure in doing things 0   Q2: Feeling down, depressed or hopeless 0   PHQ-2 Score 0    Q1: Little interest or pleasure in doing things Not at all   Q2: Feeling down, depressed or hopeless Not at all   PHQ-2 Score 0       Patient-reported           6/17/2025   Substance Use   Alcohol more than 3/day or more than 7/wk No   Do you have a current opioid prescription? No   How severe/bad is pain from 1 to 10? 2/10   Do you use any other substances recreationally? No     Social  History     Tobacco Use    Smoking status: Never    Smokeless tobacco: Never   Vaping Use    Vaping status: Never Used   Substance Use Topics    Alcohol use: Not Currently     Comment: rare    Drug use: No     {Provider  If there are gaps in the social history shown above, please follow the link to update and then refresh the note Link to Social and Substance History :913577}      6/17/2025   AAA Screening   Family history of Abdominal Aortic Aneurysm (AAA)? (!) YES ***   ASCVD Risk   The ASCVD Risk score (Karan ABERNATHY, et al., 2019) failed to calculate for the following reasons:    Risk score cannot be calculated because patient has a medical history suggesting prior/existing ASCVD    {Link to Fracture Risk Assessment Tool (Optional):908015}    {Provider  REQUIRED FOR AWV Use the storyboard to review patient history, after sections have been marked as reviewed, refresh note to capture documentation:592339}  {Provider   REQUIRED AWV use this link to review and update sexual activity history  after section has been marked as reviewed, refresh note to capture documentation:157497}  Reviewed and updated as needed this visit by Provider                    {HISTORY OPTIONS (Optional):496204}  Current providers sharing in care for this patient include:  Patient Care Team:  Brad Roldan MD as PCP - General (Family Medicine)  Brad Roldan MD as Assigned PCP  Marline Ohara MD as Assigned Heart and Vascular Provider  Gayatri Prajapati APRN CNP as Nurse Practitioner (Colon & Rectal)    The following health maintenance items are reviewed in Epic and correct as of today:  Health Maintenance   Topic Date Due    HF ACTION PLAN  Never done    ANNUAL REVIEW OF HM ORDERS  Never done    RSV VACCINE (1 - Risk 60-74 years 1-dose series) Never done    BMP  12/04/2024    COVID-19 VACCINE (7 - 2024-25 season) 05/14/2025    ALT  06/04/2025    LIPID  06/04/2025    CBC  06/04/2025    MEDICARE ANNUAL  "WELLNESS VISIT  06/04/2025    FALL RISK ASSESSMENT  06/18/2026    DIABETES SCREENING  06/04/2027    ADVANCE CARE PLANNING  06/04/2029    COLORECTAL CANCER SCREENING  10/23/2029    DTAP/TDAP/TD VACCINE (3 - Td or Tdap) 05/08/2033    TSH W/FREE T4 REFLEX  Completed    HEPATITIS C SCREENING  Completed    PHQ-2 (once per calendar year)  Completed    INFLUENZA VACCINE  Completed    PNEUMOCOCCAL VACCINE 50+ YEARS  Completed    ZOSTER VACCINE  Completed    HPV VACCINE  Aged Out    MENINGITIS VACCINE  Aged Out       {ROS Picklists (Optional):804629}     Objective    Exam  /72 (BP Location: Left arm, Patient Position: Sitting, Cuff Size: Adult Regular)   Pulse 56   Temp 97.7  F (36.5  C) (Oral)   Resp 16   Ht 1.854 m (6' 1\")   Wt 93.8 kg (206 lb 12.8 oz)   SpO2 98%   BMI 27.28 kg/m     Estimated body mass index is 27.28 kg/m  as calculated from the following:    Height as of this encounter: 1.854 m (6' 1\").    Weight as of this encounter: 93.8 kg (206 lb 12.8 oz).    Physical Exam  {Exam Choices (Optional):832458}         6/18/2025   Mini Cog   Clock Draw Score 2 Normal   3 Item Recall 3 objects recalled   Mini Cog Total Score 5     {A Mini-Cog total score of 0-2 suggests the possibility of dementia, score of 3-5 suggests no dementia:110071}         Signed Electronically by: Brad Roldan MD  {Email feedback regarding this note to [email or web address]   :658946}  " "06/04/2027    ADVANCE CARE PLANNING  06/04/2029    COLORECTAL CANCER SCREENING  10/23/2029    DTAP/TDAP/TD VACCINE (3 - Td or Tdap) 05/08/2033    TSH W/FREE T4 REFLEX  Completed    HEPATITIS C SCREENING  Completed    PHQ-2 (once per calendar year)  Completed    INFLUENZA VACCINE  Completed    PNEUMOCOCCAL VACCINE 50+ YEARS  Completed    ZOSTER VACCINE  Completed    HPV VACCINE  Aged Out    MENINGITIS VACCINE  Aged Out         Review of Systems  Constitutional, HEENT, cardiovascular, pulmonary, gi and gu systems are negative, except as otherwise noted.     Objective    Exam  /72 (BP Location: Left arm, Patient Position: Sitting, Cuff Size: Adult Regular)   Pulse 56   Temp 97.7  F (36.5  C) (Oral)   Resp 16   Ht 1.854 m (6' 1\")   Wt 93.8 kg (206 lb 12.8 oz)   SpO2 98%   BMI 27.28 kg/m     Estimated body mass index is 27.28 kg/m  as calculated from the following:    Height as of this encounter: 1.854 m (6' 1\").    Weight as of this encounter: 93.8 kg (206 lb 12.8 oz).    Physical Exam  GENERAL: alert and no distress  EYES: Eyes grossly normal to inspection, PERRL and conjunctivae and sclerae normal  There is eyelid laxity bilaterally  HENT: ear canals and TM's normal, nose and mouth without ulcers or lesions  NECK: no adenopathy, no asymmetry, masses, or scars  RESP: lungs clear to auscultation - no rales, rhonchi or wheezes  CV: regular rate and rhythm, normal S1 S2, no S3 or S4, no murmur, click or rub, no peripheral edema  ABDOMEN: soft, nontender  MS: There is limited range of motion with flexion of the right knee  SKIN: no suspicious lesions or rashes  NEURO: Normal strength and tone, mentation intact and speech normal  PSYCH: mentation appears normal, affect normal/bright         6/18/2025   Mini Cog   Clock Draw Score 2 Normal   3 Item Recall 3 objects recalled   Mini Cog Total Score 5              Signed Electronically by: Brad Roldan MD    "

## 2025-06-18 NOTE — PATIENT INSTRUCTIONS
Patient Education     Hi Chao,    It was good to see you  We will check your laboratory testing as discussed  I recommend that you take MiraLAX on a daily basis  Keep a diary of your symptoms and bowel habits  Follow-up with [facility] as the next step  You may follow-up with [facility] orthopedics for second opinion regarding your knee  You can consider a COVID booster in the fall    Brad Roldan MD        Preventive Care Advice   This is general advice given by our system to help you stay healthy. However, your care team may have specific advice just for you. Please talk to your care team about your preventive care needs.  Nutrition  Eat 5 or more servings of fruits and vegetables each day.  Try wheat bread, brown rice and whole grain pasta (instead of white bread, rice, and pasta).  Get enough calcium and vitamin D. Check the label on foods and aim for 100% of the RDA (recommended daily allowance).  Lifestyle  Exercise at least 150 minutes each week  (30 minutes a day, 5 days a week).  Do muscle strengthening activities 2 days a week. These help control your weight and prevent disease.  No smoking.  Wear sunscreen to prevent skin cancer.  Have a dental exam and cleaning every 6 months.  Yearly exams  See your health care team every year to talk about:  Any changes in your health.  Any medicines your care team has prescribed.  Preventive care, family planning, and ways to prevent chronic diseases.  Shots (vaccines)   HPV shots (up to age 26), if you've never had them before.  Hepatitis B shots (up to age 59), if you've never had them before.  COVID-19 shot: Get this shot when it's due.  Flu shot: Get a flu shot every year.  Tetanus shot: Get a tetanus shot every 10 years.  Pneumococcal, hepatitis A, and RSV shots: Ask your care team if you need these based on your risk.  Shingles shot (for age 50 and up)  General health tests  Diabetes screening:  Starting at age 35, Get screened for  diabetes at least every 3 years.  If you are younger than age 35, ask your care team if you should be screened for diabetes.  Cholesterol test: At age 39, start having a cholesterol test every 5 years, or more often if advised.  Bone density scan (DEXA): At age 50, ask your care team if you should have this scan for osteoporosis (brittle bones).  Cancer screening tests  Cervical cancer screening: If you have a cervix, begin getting regular cervical cancer screening tests starting at age 21.  Breast cancer scan (mammogram): If you've ever had breasts, begin having regular mammograms starting at age 40. This is a scan to check for breast cancer.  Colon cancer screening: It is important to start screening for colon cancer at age 45.  Have a colonoscopy test every 10 years (or more often if you're at risk) Or, ask your provider about stool tests like a FIT test every year or Cologuard test every 3 years.  To learn more about your testing options, visit:   .  For help making a decision, visit:   https://bit.ly/hd12275.  Prostate cancer screening test: If you have a prostate, ask your care team if a prostate cancer screening test (PSA) at age 55 is right for you.  Lung cancer screening: If you are a current or former smoker ages 50 to 80, ask your care team if ongoing lung cancer screenings are right for you.

## 2025-06-19 ENCOUNTER — PATIENT OUTREACH (OUTPATIENT)
Dept: CARE COORDINATION | Facility: CLINIC | Age: 75
End: 2025-06-19
Payer: COMMERCIAL

## 2025-06-23 ENCOUNTER — PATIENT OUTREACH (OUTPATIENT)
Dept: CARE COORDINATION | Facility: CLINIC | Age: 75
End: 2025-06-23
Payer: COMMERCIAL

## 2025-07-01 ENCOUNTER — TRANSFERRED RECORDS (OUTPATIENT)
Dept: MULTI SPECIALTY CLINIC | Facility: CLINIC | Age: 75
End: 2025-07-01
Payer: COMMERCIAL

## 2025-07-01 LAB — TSH SERPL-ACNC: 2 UIU/ML (ref 0.45–4.5)

## 2025-07-02 ENCOUNTER — TRANSFERRED RECORDS (OUTPATIENT)
Dept: ADMINISTRATIVE | Facility: CLINIC | Age: 75
End: 2025-07-02
Payer: COMMERCIAL

## 2025-07-03 NOTE — PROCEDURES
2025        Harrison Keitandel   [address] [place] [address]-      Harrison Barrientos,  :  1950    I'm writing to let you know about the tests that were taken recently.   Thank you for allowing [facility] the opportunity to take part in your healthcare.  At [facility] we strive to provide each patient with the finest gastroenterology care available.  We hope your experience was pleasant and informative.    Your thyroid blood test was normal.  TSH 2025 14:23   Description Result Units Flags Range   TSH 2.000 uIU/mL  0.450-4.500   Comments   Performed At: , [facility] [place]  [address] [place] [address], [place], [place], [id number]  Do Davidson MD, Phone: [phone]           Thank you.    Electronically signed by:  Yayo Weiss MD 2025 11:24 AM  Document generated by:  Yayo Weiss MD  2025  If your provider ordered multiple tests; the results may not become available at the same time.  If multiple test results are received within 14 days of one another, you may receive a duplicate.  cc:  Brad Roldan MD

## 2025-07-07 ENCOUNTER — TRANSFERRED RECORDS (OUTPATIENT)
Dept: FAMILY MEDICINE | Facility: CLINIC | Age: 75
End: 2025-07-07

## 2025-07-07 ENCOUNTER — TRANSFERRED RECORDS (OUTPATIENT)
Dept: ADMINISTRATIVE | Facility: CLINIC | Age: 75
End: 2025-07-07
Payer: COMMERCIAL

## 2025-07-08 NOTE — PROCEDURES
2025        Harrison Barrientos   [address] [place] [address]      Harrison Barrientos,  :  1950    I'm writing to let you know about the tests that were taken recently.   Thank you for allowing [facility] the opportunity to take part in your healthcare.  At [facility] we strive to provide each patient with the finest gastroenterology care available.  We hope your experience was pleasant and informative.    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  This could be the cause of your chronic constipation issues.   I recommend further evaluation including upper endoscopy with small bowel biopsies to confirm celiac sprue.  Do not make any changes to your diet at this time until after we have small bowel biopsy results.  TSH 2025 14:23   Description Result Units Flags Range   TSH 2.000 uIU/mL  0.450-4.500   Comments   Performed At: [facility] [place] [address] [place], [place], [id number]  Do Davidson MD, Phone: [phone]     IgA+t-Joe 2025 14:23   Description Result Units Flags Range   Immunoglobulin A, Qn, Serum 168 mg/dL     t-Transglutaminase (tTG) IgA 7 U/mL H 0-3   Comments   Performed At: [facility]  [employer] [place], [place], [id number]  Do Davidson MD, Phone: [phone]  Performed At: , [facility], [id number]  Abdirashid Alford MD, Phone: [phone]   t-Transglutaminase (tTG) IgA:                                               Negative        0 -  3                                               Weak Positive   4 - 10                                               Positive           >10                                                                    .                  Tissue Transglutaminase (tTG) has been identified                  as the endomysial antigen.  Studies have demonstr-                  ated that endomysial IgA antibodies have over 99%                  specificity for gluten  sensitive enteropathy.           Thank you.    Electronically signed by:  Yayo Weiss MD 07/07/2025 07:41 AM  Document generated by:  Yayo Weiss MD  07/07/2025  If your provider ordered multiple tests; the results may not become available at the same time.  If multiple test results are received within 14 days of one another, you may receive a duplicate.  cc:  Brad Roldan MD

## 2025-08-05 DIAGNOSIS — E78.5 HYPERLIPEMIA: ICD-10-CM

## 2025-08-05 DIAGNOSIS — E78.00 HYPERCHOLESTEROLEMIA: ICD-10-CM

## 2025-08-05 RX ORDER — ATORVASTATIN CALCIUM 40 MG/1
40 TABLET, FILM COATED ORAL DAILY
Qty: 90 TABLET | Refills: 0 | Status: SHIPPED | OUTPATIENT
Start: 2025-08-05

## 2025-08-11 DIAGNOSIS — I25.5 ISCHEMIC CARDIOMYOPATHY: ICD-10-CM

## 2025-08-11 RX ORDER — CARVEDILOL 12.5 MG/1
12.5 TABLET ORAL 2 TIMES DAILY WITH MEALS
Qty: 180 TABLET | Refills: 0 | Status: SHIPPED | OUTPATIENT
Start: 2025-08-11

## 2025-08-19 ENCOUNTER — TRANSFERRED RECORDS (OUTPATIENT)
Dept: HEALTH INFORMATION MANAGEMENT | Facility: CLINIC | Age: 75
End: 2025-08-19
Payer: COMMERCIAL

## 2025-08-20 ENCOUNTER — OFFICE VISIT (OUTPATIENT)
Dept: CARDIOLOGY | Facility: CLINIC | Age: 75
End: 2025-08-20
Attending: INTERNAL MEDICINE
Payer: COMMERCIAL

## 2025-08-20 VITALS
HEART RATE: 61 BPM | WEIGHT: 208 LBS | RESPIRATION RATE: 16 BRPM | BODY MASS INDEX: 27.44 KG/M2 | DIASTOLIC BLOOD PRESSURE: 76 MMHG | SYSTOLIC BLOOD PRESSURE: 138 MMHG | OXYGEN SATURATION: 95 %

## 2025-08-20 DIAGNOSIS — I25.119 CORONARY ARTERY DISEASE INVOLVING NATIVE CORONARY ARTERY OF NATIVE HEART WITH ANGINA PECTORIS: ICD-10-CM

## 2025-08-20 PROCEDURE — 99214 OFFICE O/P EST MOD 30 MIN: CPT | Performed by: INTERNAL MEDICINE

## 2025-08-20 PROCEDURE — G2211 COMPLEX E/M VISIT ADD ON: HCPCS | Performed by: INTERNAL MEDICINE

## 2025-08-20 PROCEDURE — 3078F DIAST BP <80 MM HG: CPT | Performed by: INTERNAL MEDICINE

## 2025-08-20 PROCEDURE — 3075F SYST BP GE 130 - 139MM HG: CPT | Performed by: INTERNAL MEDICINE

## 2025-08-25 ENCOUNTER — OFFICE VISIT (OUTPATIENT)
Dept: FAMILY MEDICINE | Facility: CLINIC | Age: 75
End: 2025-08-25
Payer: COMMERCIAL

## 2025-08-25 DIAGNOSIS — I10 BENIGN ESSENTIAL HYPERTENSION: ICD-10-CM

## 2025-08-26 RX ORDER — LISINOPRIL 10 MG/1
10 TABLET ORAL DAILY
Qty: 90 TABLET | Refills: 2 | Status: SHIPPED | OUTPATIENT
Start: 2025-08-26

## 2025-08-31 PROBLEM — I10 ESSENTIAL HYPERTENSION: Status: ACTIVE | Noted: 2025-08-31

## (undated) DEVICE — GLOVE UNDER INDICATOR PI SZ 7.0 LF 41670

## (undated) DEVICE — SU STRATAFIX PDS PLUS 1 CT-1 18" SXPP1A404

## (undated) DEVICE — ADH SKIN CLOSURE PREMIERPRO EXOFIN 1.0ML 3470

## (undated) DEVICE — SOL WATER IRRIG 1000ML BOTTLE 2F7114

## (undated) DEVICE — SUCTION IRR SYSTEM W/O TIP INTERPULSE HANDPIECE 0210-100-000

## (undated) DEVICE — GLOVE BIOGEL INDICATOR 7.5 LF 41675

## (undated) DEVICE — SOL NACL 0.9% IRRIG 1000ML BOTTLE 2F7124

## (undated) DEVICE — GLOVE SURGEON PI ORTHO SZ 6-1/2 LF

## (undated) DEVICE — ESU BIPOLAR SEALER AQUAMANTYS 6MM 23-112-1

## (undated) DEVICE — HOOD SURG T7 PLUS STRL LF DISP 0416-801-200

## (undated) DEVICE — DRESSING MEPILEX AG SILVER 4X12 395990

## (undated) DEVICE — SOLUTION IRRIG 2B7127 .9NS 3000ML BAG

## (undated) DEVICE — GLOVE BIOGEL PI ULTRATOUCH G SZ 6.5 42165

## (undated) DEVICE — PLATE GROUNDING ADULT W/CORD 9165L

## (undated) DEVICE — HOLDER LIMB VELCRO OR 0814-1533

## (undated) DEVICE — CUSTOM PACK TOTAL KNEE SOP5BTKHEC

## (undated) DEVICE — CUFF TOURN 34IN STRL DISP

## (undated) DEVICE — SUTURE VICRYL+ 1 27IN CT-1 UND VCP261H

## (undated) DEVICE — SU MONOCRYL 3-0 PS-2 18" UND MCP497G

## (undated) DEVICE — DECANTER VIAL 2006S

## (undated) DEVICE — GLOVE BIOGEL PI SZ 8.5 40885

## (undated) DEVICE — GLOVE SURG PI ULTRA TOUCH M SZ 7-1/2 LF

## (undated) DEVICE — GOWN LG DISP 9515

## (undated) DEVICE — CLEANSER JET LAVAGE IRRISEPT 0.05% CHG IRRISEPT45USA

## (undated) DEVICE — GLOVE BIOGEL PI SZ 6.5 40865

## (undated) DEVICE — SUCTION MANIFOLD NEPTUNE 2 SYS 4 PORT 0702-020-000

## (undated) DEVICE — SUTURE VICRYL+ 2-0 27IN CT-1 UND VCP259H

## (undated) DEVICE — GLOVE UNDER INDICATOR PI SZ 6.5 LF 41665

## (undated) DEVICE — BANDAGE ELASTIC 6X550 LF DBL 593-96LF

## (undated) DEVICE — GLOVE UNDER INDICATOR PI SZ 8.5 LF 41685

## (undated) DEVICE — CAST PADDING 6" STERILE 9046S

## (undated) DEVICE — HOOD FLYTE SURGICOOL

## (undated) DEVICE — BONE CLEANING TIP INTERPULSE  0210-010-000

## (undated) DEVICE — SOL NACL 0.9% INJ 1000ML BAG 2B1324X

## (undated) DEVICE — CUSTOM PACK TOTAL KNEE ACCESSORY SOP5BTAHEA

## (undated) DEVICE — BLADE SAW SAGITTAL STRK DUAL CUT 4118-135-090

## (undated) DEVICE — PREP CHLORAPREP 26ML TINTED HI-LITE ORANGE 930815

## (undated) DEVICE — GLOVE BIOGEL PI SZ 7.0 40870

## (undated) RX ORDER — LIDOCAINE HYDROCHLORIDE 10 MG/ML
INJECTION, SOLUTION EPIDURAL; INFILTRATION; INTRACAUDAL; PERINEURAL
Status: DISPENSED
Start: 2022-07-06

## (undated) RX ORDER — FENTANYL CITRATE 50 UG/ML
INJECTION, SOLUTION INTRAMUSCULAR; INTRAVENOUS
Status: DISPENSED
Start: 2022-07-06

## (undated) RX ORDER — FENTANYL CITRATE-0.9 % NACL/PF 10 MCG/ML
PLASTIC BAG, INJECTION (ML) INTRAVENOUS
Status: DISPENSED
Start: 2022-07-06